# Patient Record
Sex: FEMALE | Race: WHITE | NOT HISPANIC OR LATINO
[De-identification: names, ages, dates, MRNs, and addresses within clinical notes are randomized per-mention and may not be internally consistent; named-entity substitution may affect disease eponyms.]

---

## 2017-06-08 ENCOUNTER — TRANSCRIPTION ENCOUNTER (OUTPATIENT)
Age: 72
End: 2017-06-08

## 2017-06-19 ENCOUNTER — APPOINTMENT (OUTPATIENT)
Dept: HEMATOLOGY ONCOLOGY | Facility: CLINIC | Age: 72
End: 2017-06-19

## 2017-06-19 ENCOUNTER — OUTPATIENT (OUTPATIENT)
Dept: OUTPATIENT SERVICES | Facility: HOSPITAL | Age: 72
LOS: 1 days | Discharge: HOME | End: 2017-06-19

## 2017-06-19 VITALS
TEMPERATURE: 99.3 F | WEIGHT: 170 LBS | DIASTOLIC BLOOD PRESSURE: 88 MMHG | SYSTOLIC BLOOD PRESSURE: 176 MMHG | HEART RATE: 81 BPM | RESPIRATION RATE: 12 BRPM

## 2017-06-19 VITALS
TEMPERATURE: 99.3 F | HEIGHT: 64 IN | RESPIRATION RATE: 12 BRPM | DIASTOLIC BLOOD PRESSURE: 88 MMHG | BODY MASS INDEX: 29.02 KG/M2 | WEIGHT: 170 LBS | HEART RATE: 81 BPM | SYSTOLIC BLOOD PRESSURE: 176 MMHG

## 2017-06-19 DIAGNOSIS — Z80.3 FAMILY HISTORY OF MALIGNANT NEOPLASM OF BREAST: ICD-10-CM

## 2017-06-20 LAB
ALBUMIN SERPL-MCNC: 4.2 G/DL
ALBUMIN/GLOB SERPL: 1.75
ALP SERPL-CCNC: 57 IU/L
ALT SERPL-CCNC: 19 IU/L
ANION GAP SERPL CALC-SCNC: 12 MEQ/L
AST SERPL-CCNC: 19 IU/L
BASOPHILS # BLD: 0.03 TH/MM3
BASOPHILS NFR BLD: 0.4 %
BILIRUB SERPL-MCNC: 0.3 MG/DL
BUN SERPL-MCNC: 16 MG/DL
BUN/CREAT SERPL: 17 %
CALCIUM SERPL-MCNC: 9 MG/DL
CHLORIDE SERPL-SCNC: 102 MEQ/L
CO2 SERPL-SCNC: 23 MEQ/L
CREAT SERPL-MCNC: 0.94 MG/DL
EOSINOPHIL # BLD: 0.14 TH/MM3
EOSINOPHIL NFR BLD: 1.7 %
ERYTHROCYTE [DISTWIDTH] IN BLOOD BY AUTOMATED COUNT: 13 %
GFR SERPL CREATININE-BSD FRML MDRD: 59
GLUCOSE SERPL-MCNC: 79 MG/DL
GRANULOCYTES # BLD: 4.04 TH/MM3
GRANULOCYTES NFR BLD: 50.1 %
HCT VFR BLD AUTO: 39 %
HGB BLD-MCNC: 13.3 G/DL
IMM GRANULOCYTES # BLD: 0.01 TH/MM3
IMM GRANULOCYTES NFR BLD: 0.1 %
LYMPHOCYTES # BLD: 3.15 TH/MM3
LYMPHOCYTES NFR BLD: 39 %
MCH RBC QN AUTO: 30.3 PG
MCHC RBC AUTO-ENTMCNC: 34.1 G/DL
MCV RBC AUTO: 88.8 FL
MONOCYTES # BLD: 0.7 TH/MM3
MONOCYTES NFR BLD: 8.7 %
PLATELET # BLD: 173 TH/MM3
PMV BLD AUTO: 11.3 FL
POTASSIUM SERPL-SCNC: 4 MMOL/L
PROT SERPL-MCNC: 6.6 G/DL
RBC # BLD AUTO: 4.39 MIL/MM3
SODIUM SERPL-SCNC: 137 MEQ/L
WBC # BLD: 8.07 TH/MM3

## 2017-06-28 DIAGNOSIS — Z85.3 PERSONAL HISTORY OF MALIGNANT NEOPLASM OF BREAST: ICD-10-CM

## 2017-08-02 ENCOUNTER — OUTPATIENT (OUTPATIENT)
Dept: OUTPATIENT SERVICES | Facility: HOSPITAL | Age: 72
LOS: 1 days | Discharge: HOME | End: 2017-08-02

## 2017-08-02 DIAGNOSIS — E66.3 OVERWEIGHT: ICD-10-CM

## 2017-08-02 DIAGNOSIS — E78.00 PURE HYPERCHOLESTEROLEMIA, UNSPECIFIED: ICD-10-CM

## 2017-08-02 DIAGNOSIS — C50.919 MALIGNANT NEOPLASM OF UNSPECIFIED SITE OF UNSPECIFIED FEMALE BREAST: ICD-10-CM

## 2017-08-02 DIAGNOSIS — Z01.818 ENCOUNTER FOR OTHER PREPROCEDURAL EXAMINATION: ICD-10-CM

## 2017-08-02 DIAGNOSIS — I10 ESSENTIAL (PRIMARY) HYPERTENSION: ICD-10-CM

## 2017-08-10 ENCOUNTER — OUTPATIENT (OUTPATIENT)
Dept: OUTPATIENT SERVICES | Facility: HOSPITAL | Age: 72
LOS: 1 days | Discharge: HOME | End: 2017-08-10

## 2017-08-10 DIAGNOSIS — G45.9 TRANSIENT CEREBRAL ISCHEMIC ATTACK, UNSPECIFIED: ICD-10-CM

## 2017-08-14 ENCOUNTER — OUTPATIENT (OUTPATIENT)
Dept: OUTPATIENT SERVICES | Facility: HOSPITAL | Age: 72
LOS: 1 days | Discharge: HOME | End: 2017-08-14

## 2017-08-14 DIAGNOSIS — R22.0 LOCALIZED SWELLING, MASS AND LUMP, HEAD: ICD-10-CM

## 2017-10-19 ENCOUNTER — OUTPATIENT (OUTPATIENT)
Dept: OUTPATIENT SERVICES | Facility: HOSPITAL | Age: 72
LOS: 1 days | Discharge: HOME | End: 2017-10-19

## 2017-10-19 DIAGNOSIS — Z12.31 ENCOUNTER FOR SCREENING MAMMOGRAM FOR MALIGNANT NEOPLASM OF BREAST: ICD-10-CM

## 2017-11-14 ENCOUNTER — OUTPATIENT (OUTPATIENT)
Dept: OUTPATIENT SERVICES | Facility: HOSPITAL | Age: 72
LOS: 1 days | Discharge: HOME | End: 2017-11-14

## 2017-11-14 DIAGNOSIS — N39.0 URINARY TRACT INFECTION, SITE NOT SPECIFIED: ICD-10-CM

## 2018-02-13 ENCOUNTER — OUTPATIENT (OUTPATIENT)
Dept: OUTPATIENT SERVICES | Facility: HOSPITAL | Age: 73
LOS: 1 days | Discharge: HOME | End: 2018-02-13

## 2018-02-13 DIAGNOSIS — E55.9 VITAMIN D DEFICIENCY, UNSPECIFIED: ICD-10-CM

## 2018-02-13 DIAGNOSIS — E06.3 AUTOIMMUNE THYROIDITIS: ICD-10-CM

## 2018-02-13 DIAGNOSIS — I10 ESSENTIAL (PRIMARY) HYPERTENSION: ICD-10-CM

## 2018-02-13 DIAGNOSIS — E78.00 PURE HYPERCHOLESTEROLEMIA, UNSPECIFIED: ICD-10-CM

## 2018-02-13 DIAGNOSIS — N39.0 URINARY TRACT INFECTION, SITE NOT SPECIFIED: ICD-10-CM

## 2018-02-13 DIAGNOSIS — R53.83 OTHER FATIGUE: ICD-10-CM

## 2018-02-13 DIAGNOSIS — Z00.00 ENCOUNTER FOR GENERAL ADULT MEDICAL EXAMINATION WITHOUT ABNORMAL FINDINGS: ICD-10-CM

## 2018-02-13 DIAGNOSIS — C50.919 MALIGNANT NEOPLASM OF UNSPECIFIED SITE OF UNSPECIFIED FEMALE BREAST: ICD-10-CM

## 2018-02-13 DIAGNOSIS — M81.0 AGE-RELATED OSTEOPOROSIS WITHOUT CURRENT PATHOLOGICAL FRACTURE: ICD-10-CM

## 2018-02-13 DIAGNOSIS — E78.2 MIXED HYPERLIPIDEMIA: ICD-10-CM

## 2018-02-13 DIAGNOSIS — E66.3 OVERWEIGHT: ICD-10-CM

## 2018-02-13 DIAGNOSIS — E66.09 OTHER OBESITY DUE TO EXCESS CALORIES: ICD-10-CM

## 2018-02-13 DIAGNOSIS — E11.8 TYPE 2 DIABETES MELLITUS WITH UNSPECIFIED COMPLICATIONS: ICD-10-CM

## 2018-02-13 DIAGNOSIS — R10.9 UNSPECIFIED ABDOMINAL PAIN: ICD-10-CM

## 2018-08-06 ENCOUNTER — APPOINTMENT (OUTPATIENT)
Dept: HEMATOLOGY ONCOLOGY | Facility: CLINIC | Age: 73
End: 2018-08-06

## 2018-08-06 ENCOUNTER — OUTPATIENT (OUTPATIENT)
Dept: OUTPATIENT SERVICES | Facility: HOSPITAL | Age: 73
LOS: 1 days | Discharge: HOME | End: 2018-08-06

## 2018-08-06 ENCOUNTER — LABORATORY RESULT (OUTPATIENT)
Age: 73
End: 2018-08-06

## 2018-08-06 VITALS
TEMPERATURE: 99.2 F | RESPIRATION RATE: 14 BRPM | WEIGHT: 173 LBS | SYSTOLIC BLOOD PRESSURE: 149 MMHG | HEART RATE: 84 BPM | HEIGHT: 64 IN | BODY MASS INDEX: 29.53 KG/M2 | DIASTOLIC BLOOD PRESSURE: 70 MMHG

## 2018-08-06 DIAGNOSIS — E78.1 PURE HYPERGLYCERIDEMIA: ICD-10-CM

## 2018-08-06 DIAGNOSIS — E78.00 PURE HYPERCHOLESTEROLEMIA, UNSPECIFIED: ICD-10-CM

## 2018-08-06 DIAGNOSIS — I10 ESSENTIAL (PRIMARY) HYPERTENSION: ICD-10-CM

## 2018-08-06 LAB
HCT VFR BLD CALC: 40.3 %
HGB BLD-MCNC: 13.5 G/DL
MCHC RBC-ENTMCNC: 30.3 PG
MCHC RBC-ENTMCNC: 33.5 G/DL
MCV RBC AUTO: 90.6 FL
PLATELET # BLD AUTO: 173 K/UL
PMV BLD: 11.5 FL
RBC # BLD: 4.45 M/UL
RBC # FLD: 12.7 %
WBC # FLD AUTO: 8.43 K/UL

## 2018-08-07 LAB
ALBUMIN SERPL ELPH-MCNC: 4.4 G/DL
ALP BLD-CCNC: 47 U/L
ALT SERPL-CCNC: 17 U/L
ANION GAP SERPL CALC-SCNC: 16 MMOL/L
AST SERPL-CCNC: 18 U/L
BILIRUB SERPL-MCNC: 0.4 MG/DL
BUN SERPL-MCNC: 13 MG/DL
CALCIUM SERPL-MCNC: 9.7 MG/DL
CHLORIDE SERPL-SCNC: 98 MMOL/L
CO2 SERPL-SCNC: 25 MMOL/L
CREAT SERPL-MCNC: 0.8 MG/DL
GLUCOSE SERPL-MCNC: 93 MG/DL
POTASSIUM SERPL-SCNC: 4.7 MMOL/L
PROT SERPL-MCNC: 7 G/DL
SODIUM SERPL-SCNC: 139 MMOL/L

## 2018-08-09 ENCOUNTER — OUTPATIENT (OUTPATIENT)
Dept: OUTPATIENT SERVICES | Facility: HOSPITAL | Age: 73
LOS: 1 days | Discharge: HOME | End: 2018-08-09

## 2018-08-09 ENCOUNTER — OTHER (OUTPATIENT)
Age: 73
End: 2018-08-09

## 2018-08-09 DIAGNOSIS — E66.3 OVERWEIGHT: ICD-10-CM

## 2018-08-09 DIAGNOSIS — E78.00 PURE HYPERCHOLESTEROLEMIA, UNSPECIFIED: ICD-10-CM

## 2018-08-09 DIAGNOSIS — C50.919 MALIGNANT NEOPLASM OF UNSPECIFIED SITE OF UNSPECIFIED FEMALE BREAST: ICD-10-CM

## 2018-08-09 DIAGNOSIS — I10 ESSENTIAL (PRIMARY) HYPERTENSION: ICD-10-CM

## 2018-08-13 DIAGNOSIS — Z85.3 PERSONAL HISTORY OF MALIGNANT NEOPLASM OF BREAST: ICD-10-CM

## 2018-10-21 ENCOUNTER — FORM ENCOUNTER (OUTPATIENT)
Age: 73
End: 2018-10-21

## 2018-10-22 ENCOUNTER — OUTPATIENT (OUTPATIENT)
Dept: OUTPATIENT SERVICES | Facility: HOSPITAL | Age: 73
LOS: 1 days | Discharge: HOME | End: 2018-10-22

## 2018-10-22 DIAGNOSIS — Z85.3 PERSONAL HISTORY OF MALIGNANT NEOPLASM OF BREAST: ICD-10-CM

## 2018-10-22 DIAGNOSIS — Z12.31 ENCOUNTER FOR SCREENING MAMMOGRAM FOR MALIGNANT NEOPLASM OF BREAST: ICD-10-CM

## 2018-11-23 ENCOUNTER — OUTPATIENT (OUTPATIENT)
Dept: OUTPATIENT SERVICES | Facility: HOSPITAL | Age: 73
LOS: 1 days | Discharge: HOME | End: 2018-11-23

## 2018-11-23 VITALS
WEIGHT: 174.17 LBS | RESPIRATION RATE: 16 BRPM | OXYGEN SATURATION: 98 % | DIASTOLIC BLOOD PRESSURE: 75 MMHG | HEART RATE: 78 BPM | TEMPERATURE: 98 F | SYSTOLIC BLOOD PRESSURE: 148 MMHG | HEIGHT: 64 IN

## 2018-11-23 DIAGNOSIS — Z01.818 ENCOUNTER FOR OTHER PREPROCEDURAL EXAMINATION: ICD-10-CM

## 2018-11-23 DIAGNOSIS — E66.9 OBESITY, UNSPECIFIED: ICD-10-CM

## 2018-11-23 DIAGNOSIS — D17.9 BENIGN LIPOMATOUS NEOPLASM, UNSPECIFIED: ICD-10-CM

## 2018-11-23 DIAGNOSIS — I10 ESSENTIAL (PRIMARY) HYPERTENSION: ICD-10-CM

## 2018-11-23 DIAGNOSIS — E78.00 PURE HYPERCHOLESTEROLEMIA, UNSPECIFIED: ICD-10-CM

## 2018-11-23 DIAGNOSIS — C80.1 MALIGNANT (PRIMARY) NEOPLASM, UNSPECIFIED: ICD-10-CM

## 2018-11-23 DIAGNOSIS — Z90.11 ACQUIRED ABSENCE OF RIGHT BREAST AND NIPPLE: Chronic | ICD-10-CM

## 2018-11-23 LAB
ALBUMIN SERPL ELPH-MCNC: 4.6 G/DL — SIGNIFICANT CHANGE UP (ref 3.5–5.2)
ALP SERPL-CCNC: 55 U/L — SIGNIFICANT CHANGE UP (ref 30–115)
ALT FLD-CCNC: 21 U/L — SIGNIFICANT CHANGE UP (ref 0–41)
ANION GAP SERPL CALC-SCNC: 17 MMOL/L — HIGH (ref 7–14)
APTT BLD: 30 SEC — SIGNIFICANT CHANGE UP (ref 27–39.2)
AST SERPL-CCNC: 23 U/L — SIGNIFICANT CHANGE UP (ref 0–41)
BASOPHILS # BLD AUTO: 0.03 K/UL — SIGNIFICANT CHANGE UP (ref 0–0.2)
BASOPHILS NFR BLD AUTO: 0.4 % — SIGNIFICANT CHANGE UP (ref 0–1)
BILIRUB SERPL-MCNC: 0.5 MG/DL — SIGNIFICANT CHANGE UP (ref 0.2–1.2)
BUN SERPL-MCNC: 14 MG/DL — SIGNIFICANT CHANGE UP (ref 10–20)
CALCIUM SERPL-MCNC: 9.8 MG/DL — SIGNIFICANT CHANGE UP (ref 8.5–10.1)
CHLORIDE SERPL-SCNC: 95 MMOL/L — LOW (ref 98–110)
CO2 SERPL-SCNC: 25 MMOL/L — SIGNIFICANT CHANGE UP (ref 17–32)
CREAT SERPL-MCNC: 0.7 MG/DL — SIGNIFICANT CHANGE UP (ref 0.7–1.5)
EOSINOPHIL # BLD AUTO: 0.15 K/UL — SIGNIFICANT CHANGE UP (ref 0–0.7)
EOSINOPHIL NFR BLD AUTO: 1.8 % — SIGNIFICANT CHANGE UP (ref 0–8)
GLUCOSE SERPL-MCNC: 91 MG/DL — SIGNIFICANT CHANGE UP (ref 70–99)
HCT VFR BLD CALC: 39.3 % — SIGNIFICANT CHANGE UP (ref 37–47)
HGB BLD-MCNC: 13.2 G/DL — SIGNIFICANT CHANGE UP (ref 12–16)
IMM GRANULOCYTES NFR BLD AUTO: 0.2 % — SIGNIFICANT CHANGE UP (ref 0.1–0.3)
INR BLD: 1.05 RATIO — SIGNIFICANT CHANGE UP (ref 0.65–1.3)
LYMPHOCYTES # BLD AUTO: 3.35 K/UL — SIGNIFICANT CHANGE UP (ref 1.2–3.4)
LYMPHOCYTES # BLD AUTO: 39.9 % — SIGNIFICANT CHANGE UP (ref 20.5–51.1)
MCHC RBC-ENTMCNC: 30 PG — SIGNIFICANT CHANGE UP (ref 27–31)
MCHC RBC-ENTMCNC: 33.6 G/DL — SIGNIFICANT CHANGE UP (ref 32–37)
MCV RBC AUTO: 89.3 FL — SIGNIFICANT CHANGE UP (ref 81–99)
MONOCYTES # BLD AUTO: 0.72 K/UL — HIGH (ref 0.1–0.6)
MONOCYTES NFR BLD AUTO: 8.6 % — SIGNIFICANT CHANGE UP (ref 1.7–9.3)
NEUTROPHILS # BLD AUTO: 4.12 K/UL — SIGNIFICANT CHANGE UP (ref 1.4–6.5)
NEUTROPHILS NFR BLD AUTO: 49.1 % — SIGNIFICANT CHANGE UP (ref 42.2–75.2)
NRBC # BLD: 0 /100 WBCS — SIGNIFICANT CHANGE UP (ref 0–0)
PLATELET # BLD AUTO: 193 K/UL — SIGNIFICANT CHANGE UP (ref 130–400)
POTASSIUM SERPL-MCNC: 4.2 MMOL/L — SIGNIFICANT CHANGE UP (ref 3.5–5)
POTASSIUM SERPL-SCNC: 4.2 MMOL/L — SIGNIFICANT CHANGE UP (ref 3.5–5)
PROT SERPL-MCNC: 7.3 G/DL — SIGNIFICANT CHANGE UP (ref 6–8)
PROTHROM AB SERPL-ACNC: 12.1 SEC — SIGNIFICANT CHANGE UP (ref 9.95–12.87)
RBC # BLD: 4.4 M/UL — SIGNIFICANT CHANGE UP (ref 4.2–5.4)
RBC # FLD: 12.4 % — SIGNIFICANT CHANGE UP (ref 11.5–14.5)
SODIUM SERPL-SCNC: 137 MMOL/L — SIGNIFICANT CHANGE UP (ref 135–146)
WBC # BLD: 8.39 K/UL — SIGNIFICANT CHANGE UP (ref 4.8–10.8)
WBC # FLD AUTO: 8.39 K/UL — SIGNIFICANT CHANGE UP (ref 4.8–10.8)

## 2018-11-23 NOTE — H&P PST ADULT - HISTORY OF PRESENT ILLNESS
"HE'S REMOVING A LIPOMA"   POINTS TO RIGHT SUPRACLAVICULAR REGION  PT CURRENTLY DENIES CHEST PAIN, PALPITATIONS, DYSURIA, RECENT ILLNESS  EXERCISE TOLERANCE 10 BLOCKS  PT DENIES ANY RASHES, ABRASION, OR OPEN WOUNDS OR LACERATIONS "HE'S REMOVING A LIPOMA"   POINTS TO RIGHT SUPRACLAVICULAR REGION  PT CURRENTLY DENIES CHEST PAIN, PALPITATIONS, DYSURIA, RECENT ILLNESS  EXERCISE TOLERANCE 10 BLOCKS  PT DENIES ANY RASHES, ABRASION, OR OPEN WOUNDS OR LACERATIONS  AS PER THE PT, THIS IS A COMPLETE MEDICAL AND SURGICAL HX, INCLUDING MEDICATIONS PRESCRIBED AND OVER THE COUNTER

## 2018-11-23 NOTE — H&P PST ADULT - FAMILY HISTORY
Mother  Still living? No  Family history of breast cancer, Age at diagnosis: Age Unknown     Father  Still living? No  Family history of rectal cancer, Age at diagnosis: Age Unknown

## 2018-11-23 NOTE — H&P PST ADULT - PMH
Cancer  BREAST   MASTECTOMY RIGHT  CHEMO RADIATION  HTN (hypertension)    Hypercholesteremia    Murmur    TIA (transient ischemic attack)  2016

## 2018-11-23 NOTE — H&P PST ADULT - NS NEC GEN PE MLT EXAM PC
Final Anesthesia Post-op Assessment    Patient: Yaya Hammond  Procedure(s) Performed: REPAIR OF UMBILICAL HERNIA WITH THE USE OF MESH  Anesthesia type: General    Vital Last Value   Temperature 36.6 °C (97.9 °F) (09/06/18 1205)   Pulse 97 (09/06/18 1305)   Respiratory Rate 18 (09/06/18 1305)   Non-Invasive   Blood Pressure 130/68 (09/06/18 1305)   Arterial  Blood Pressure     Pulse Oximetry 93 % (09/06/18 1305)     Last 24 I/O:   Intake/Output Summary (Last 24 hours) at 09/06/18 1322  Last data filed at 09/06/18 1200   Gross per 24 hour   Intake             1650 ml   Output                0 ml   Net             1650 ml       PATIENT LOCATION: Phase II  POST-OP VITAL SIGNS: stable  LEVEL OF CONSCIOUSNESS: participates in exam, awake, oriented, answers questions appropriately and alert  RESPIRATORY STATUS: spontaneous ventilation and unassisted  CARDIOVASCULAR: blood pressure returned to baseline  HYDRATION: euvolemic    PAIN MANAGEMENT: well controlled  NAUSEA: None  AIRWAY PATENCY: patent  POST-OP ASSESSMENT: no complications, patient tolerated procedure well with no complications, no evidence of recall and sufficiently recovered from acute administration of anesthesia effects and able to participate in evaluation  COMPLICATIONS: none  HANDOFF:  Handoff to receiving nurse was performed and questions were answered       detailed exam

## 2018-11-26 PROBLEM — E78.00 PURE HYPERCHOLESTEROLEMIA, UNSPECIFIED: Chronic | Status: ACTIVE | Noted: 2018-11-23

## 2018-11-26 PROBLEM — G45.9 TRANSIENT CEREBRAL ISCHEMIC ATTACK, UNSPECIFIED: Chronic | Status: ACTIVE | Noted: 2018-11-23

## 2018-11-26 PROBLEM — I10 ESSENTIAL (PRIMARY) HYPERTENSION: Chronic | Status: ACTIVE | Noted: 2018-11-23

## 2018-11-26 PROBLEM — R01.1 CARDIAC MURMUR, UNSPECIFIED: Chronic | Status: ACTIVE | Noted: 2018-11-23

## 2018-12-07 ENCOUNTER — RESULT REVIEW (OUTPATIENT)
Age: 73
End: 2018-12-07

## 2018-12-07 ENCOUNTER — OUTPATIENT (OUTPATIENT)
Dept: OUTPATIENT SERVICES | Facility: HOSPITAL | Age: 73
LOS: 1 days | Discharge: HOME | End: 2018-12-07

## 2018-12-07 VITALS — DIASTOLIC BLOOD PRESSURE: 58 MMHG | HEART RATE: 82 BPM | RESPIRATION RATE: 18 BRPM | SYSTOLIC BLOOD PRESSURE: 122 MMHG

## 2018-12-07 VITALS
TEMPERATURE: 98 F | SYSTOLIC BLOOD PRESSURE: 154 MMHG | RESPIRATION RATE: 16 BRPM | HEIGHT: 64 IN | WEIGHT: 167.99 LBS | HEART RATE: 81 BPM | DIASTOLIC BLOOD PRESSURE: 74 MMHG

## 2018-12-07 DIAGNOSIS — Z90.11 ACQUIRED ABSENCE OF RIGHT BREAST AND NIPPLE: Chronic | ICD-10-CM

## 2018-12-07 RX ORDER — OXYCODONE AND ACETAMINOPHEN 5; 325 MG/1; MG/1
1 TABLET ORAL EVERY 4 HOURS
Qty: 0 | Refills: 0 | Status: DISCONTINUED | OUTPATIENT
Start: 2018-12-07 | End: 2018-12-07

## 2018-12-07 RX ORDER — SIMVASTATIN 20 MG/1
1 TABLET, FILM COATED ORAL
Qty: 0 | Refills: 0 | COMMUNITY

## 2018-12-07 RX ORDER — SODIUM CHLORIDE 9 MG/ML
1000 INJECTION, SOLUTION INTRAVENOUS
Qty: 0 | Refills: 0 | Status: DISCONTINUED | OUTPATIENT
Start: 2018-12-07 | End: 2018-12-22

## 2018-12-07 RX ORDER — MORPHINE SULFATE 50 MG/1
2 CAPSULE, EXTENDED RELEASE ORAL
Qty: 0 | Refills: 0 | Status: DISCONTINUED | OUTPATIENT
Start: 2018-12-07 | End: 2018-12-07

## 2018-12-07 RX ORDER — OMEGA-3 ACID ETHYL ESTERS 1 G
2800 CAPSULE ORAL
Qty: 0 | Refills: 0 | COMMUNITY

## 2018-12-07 RX ORDER — ASPIRIN/CALCIUM CARB/MAGNESIUM 324 MG
1 TABLET ORAL
Qty: 0 | Refills: 0 | COMMUNITY

## 2018-12-07 RX ORDER — ONDANSETRON 8 MG/1
4 TABLET, FILM COATED ORAL ONCE
Qty: 0 | Refills: 0 | Status: DISCONTINUED | OUTPATIENT
Start: 2018-12-07 | End: 2018-12-22

## 2018-12-07 NOTE — ASU PATIENT PROFILE, ADULT - PAIN SCALE PREFERRED, PROFILE
numerical 0-10
other/age(85 years old or older)/coagulation(Bleeding disorder R/T clinical cond/anti-coags)

## 2018-12-07 NOTE — BRIEF OPERATIVE NOTE - PROCEDURE
<<-----Click on this checkbox to enter Procedure Excision of mass  12/07/2018  right shoulder  Active  APAL1

## 2018-12-07 NOTE — ASU DISCHARGE PLAN (ADULT/PEDIATRIC). - MEDICATION SUMMARY - MEDICATIONS TO TAKE
I will START or STAY ON the medications listed below when I get home from the hospital:    oxyCODONE-acetaminophen 5 mg-325 mg oral tablet  -- 1 tab(s) by mouth every 6 hours, As Needed -for severe pain MDD:4 tab   -- Caution federal law prohibits the transfer of this drug to any person other  than the person for whom it was prescribed.  May cause drowsiness.  Alcohol may intensify this effect.  Use care when operating dangerous machinery.  This prescription cannot be refilled.  This product contains acetaminophen.  Do not use  with any other product containing acetaminophen to prevent possible liver damage.  Using more of this medication than prescribed may cause serious breathing problems.    -- Indication: For [id number]

## 2018-12-11 LAB — SURGICAL PATHOLOGY STUDY: SIGNIFICANT CHANGE UP

## 2018-12-12 DIAGNOSIS — D17.0 BENIGN LIPOMATOUS NEOPLASM OF SKIN AND SUBCUTANEOUS TISSUE OF HEAD, FACE AND NECK: ICD-10-CM

## 2018-12-12 DIAGNOSIS — E78.00 PURE HYPERCHOLESTEROLEMIA, UNSPECIFIED: ICD-10-CM

## 2018-12-12 DIAGNOSIS — I10 ESSENTIAL (PRIMARY) HYPERTENSION: ICD-10-CM

## 2019-02-13 ENCOUNTER — OUTPATIENT (OUTPATIENT)
Dept: OUTPATIENT SERVICES | Facility: HOSPITAL | Age: 74
LOS: 1 days | Discharge: HOME | End: 2019-02-13

## 2019-02-13 DIAGNOSIS — Z90.11 ACQUIRED ABSENCE OF RIGHT BREAST AND NIPPLE: Chronic | ICD-10-CM

## 2019-02-13 DIAGNOSIS — E78.00 PURE HYPERCHOLESTEROLEMIA, UNSPECIFIED: ICD-10-CM

## 2019-02-13 DIAGNOSIS — E66.3 OVERWEIGHT: ICD-10-CM

## 2019-02-13 DIAGNOSIS — C50.919 MALIGNANT NEOPLASM OF UNSPECIFIED SITE OF UNSPECIFIED FEMALE BREAST: ICD-10-CM

## 2019-02-13 DIAGNOSIS — I10 ESSENTIAL (PRIMARY) HYPERTENSION: ICD-10-CM

## 2019-05-28 ENCOUNTER — RECORD ABSTRACTING (OUTPATIENT)
Age: 74
End: 2019-05-28

## 2019-05-28 DIAGNOSIS — Z86.69 PERSONAL HISTORY OF OTHER DISEASES OF THE NERVOUS SYSTEM AND SENSE ORGANS: ICD-10-CM

## 2019-06-19 ENCOUNTER — APPOINTMENT (OUTPATIENT)
Dept: NEUROLOGY | Facility: CLINIC | Age: 74
End: 2019-06-19
Payer: MEDICARE

## 2019-06-19 VITALS
DIASTOLIC BLOOD PRESSURE: 76 MMHG | WEIGHT: 168 LBS | BODY MASS INDEX: 28.68 KG/M2 | HEART RATE: 85 BPM | HEIGHT: 64 IN | SYSTOLIC BLOOD PRESSURE: 125 MMHG

## 2019-06-19 DIAGNOSIS — G45.9 TRANSIENT CEREBRAL ISCHEMIC ATTACK, UNSPECIFIED: ICD-10-CM

## 2019-06-19 PROCEDURE — 99214 OFFICE O/P EST MOD 30 MIN: CPT

## 2019-06-19 RX ORDER — ENALAPRIL MALEATE 20 MG/1
20 TABLET ORAL
Refills: 0 | Status: ACTIVE | COMMUNITY

## 2019-06-19 RX ORDER — SIMVASTATIN 20 MG/1
20 TABLET, FILM COATED ORAL
Refills: 0 | Status: ACTIVE | COMMUNITY

## 2019-06-19 RX ORDER — ASPIRIN 81 MG/1
81 TABLET, CHEWABLE ORAL DAILY
Refills: 0 | Status: ACTIVE | COMMUNITY

## 2019-06-19 NOTE — DISCUSSION/SUMMARY
[FreeTextEntry1] : Patient with history of TIA here for follow up.  No new complaints and doing well\par 1. Carotid dopplers - should also be done yearly\par 2 Can follow with her PMD and return for any new neurological issues

## 2019-06-19 NOTE — HISTORY OF PRESENT ILLNESS
[FreeTextEntry1] : Callie is a 74-year-old woman last seen by me on January 11, 2018 for followup of her TIA. Been on baby aspirin as well as simvastatin and has been doing well with no further episodes of diplopia or dizziness. She did see a doctor in the last 2 months he said that he heard a carotid bruit and then follow with her cardiologist who did not hear carotid bruit. She has not had carotid Dopplers done since the hospitalization 2-1/2 years ago.  I did a CT angiogram on her on August 14, 2017 which only showed atherosclerosis of the cavernous and supraclinoid internal carotid arteries without significant stenosis. She did have last year lipoma from the right shoulder region removed and now has a new lipoma in the right trapezius location. She has no new complaints and otherwise feels well.

## 2019-06-19 NOTE — PHYSICAL EXAM
[FreeTextEntry1] : Orientation: oriented to person, oriented to place and oriented to time. \par Attention: normal concentrating ability and visual attention was not decreased. \par Language: no difficulty naming common objects, no difficulty repeating a phrase, no difficulty writing a sentence, fluency intact, comprehension intact and reading intact. \par Fund of knowledge: displays adequate knowledge of personal past history. \par Cranial Nerves: visual fields full to confrontation, pupils equal round and reactive to light, extraocular motion intact, facial sensation intact symmetrically, face symmetrical, hearing was intact bilaterally, tongue and palate midline, head turning and shoulder shrug symmetric and there was no tongue deviation with protrusion. \par Motor: muscle tone was normal in all four extremities, muscle strength was normal in all four extremities and normal bulk in all four extremities. \par Sensory exam: light touch, PP, Vibration was intact. \par Coordination:. normal gait. balance was intact. there was no past-pointing. no tremor present. \par Deep tendon reflexes: \par 1+ in UE and absent in LE\par Plantar responses normal on the right, normal on the left.  \par

## 2019-06-28 ENCOUNTER — OUTPATIENT (OUTPATIENT)
Dept: OUTPATIENT SERVICES | Facility: HOSPITAL | Age: 74
LOS: 1 days | Discharge: HOME | End: 2019-06-28
Payer: MEDICARE

## 2019-06-28 DIAGNOSIS — G45.9 TRANSIENT CEREBRAL ISCHEMIC ATTACK, UNSPECIFIED: ICD-10-CM

## 2019-06-28 DIAGNOSIS — Z90.11 ACQUIRED ABSENCE OF RIGHT BREAST AND NIPPLE: Chronic | ICD-10-CM

## 2019-06-28 PROCEDURE — 93880 EXTRACRANIAL BILAT STUDY: CPT | Mod: 26

## 2019-08-08 ENCOUNTER — OUTPATIENT (OUTPATIENT)
Dept: OUTPATIENT SERVICES | Facility: HOSPITAL | Age: 74
LOS: 1 days | Discharge: HOME | End: 2019-08-08

## 2019-08-08 DIAGNOSIS — E78.00 PURE HYPERCHOLESTEROLEMIA, UNSPECIFIED: ICD-10-CM

## 2019-08-08 DIAGNOSIS — C50.919 MALIGNANT NEOPLASM OF UNSPECIFIED SITE OF UNSPECIFIED FEMALE BREAST: ICD-10-CM

## 2019-08-08 DIAGNOSIS — Z90.11 ACQUIRED ABSENCE OF RIGHT BREAST AND NIPPLE: Chronic | ICD-10-CM

## 2019-08-08 DIAGNOSIS — E66.3 OVERWEIGHT: ICD-10-CM

## 2019-08-08 DIAGNOSIS — I10 ESSENTIAL (PRIMARY) HYPERTENSION: ICD-10-CM

## 2019-11-04 ENCOUNTER — APPOINTMENT (OUTPATIENT)
Dept: HEMATOLOGY ONCOLOGY | Facility: CLINIC | Age: 74
End: 2019-11-04
Payer: MEDICARE

## 2019-11-04 ENCOUNTER — OUTPATIENT (OUTPATIENT)
Dept: OUTPATIENT SERVICES | Facility: HOSPITAL | Age: 74
LOS: 1 days | Discharge: HOME | End: 2019-11-04

## 2019-11-04 ENCOUNTER — FORM ENCOUNTER (OUTPATIENT)
Age: 74
End: 2019-11-04

## 2019-11-04 ENCOUNTER — LABORATORY RESULT (OUTPATIENT)
Age: 74
End: 2019-11-04

## 2019-11-04 VITALS
BODY MASS INDEX: 29.71 KG/M2 | SYSTOLIC BLOOD PRESSURE: 135 MMHG | HEIGHT: 64 IN | WEIGHT: 174 LBS | TEMPERATURE: 97.7 F | RESPIRATION RATE: 14 BRPM | HEART RATE: 85 BPM | DIASTOLIC BLOOD PRESSURE: 62 MMHG

## 2019-11-04 DIAGNOSIS — Z90.11 ACQUIRED ABSENCE OF RIGHT BREAST AND NIPPLE: Chronic | ICD-10-CM

## 2019-11-04 DIAGNOSIS — Z00.00 ENCOUNTER FOR GENERAL ADULT MEDICAL EXAMINATION W/OUT ABNORMAL FINDINGS: ICD-10-CM

## 2019-11-04 PROCEDURE — 99213 OFFICE O/P EST LOW 20 MIN: CPT

## 2019-11-05 ENCOUNTER — OUTPATIENT (OUTPATIENT)
Dept: OUTPATIENT SERVICES | Facility: HOSPITAL | Age: 74
LOS: 1 days | Discharge: HOME | End: 2019-11-05
Payer: MEDICARE

## 2019-11-05 DIAGNOSIS — Z12.31 ENCOUNTER FOR SCREENING MAMMOGRAM FOR MALIGNANT NEOPLASM OF BREAST: ICD-10-CM

## 2019-11-05 DIAGNOSIS — Z85.3 PERSONAL HISTORY OF MALIGNANT NEOPLASM OF BREAST: ICD-10-CM

## 2019-11-05 DIAGNOSIS — Z90.11 ACQUIRED ABSENCE OF RIGHT BREAST AND NIPPLE: Chronic | ICD-10-CM

## 2019-11-05 LAB
ALBUMIN SERPL ELPH-MCNC: 4.6 G/DL
ALP BLD-CCNC: 59 U/L
ALT SERPL-CCNC: 13 U/L
ANION GAP SERPL CALC-SCNC: 19 MMOL/L
AST SERPL-CCNC: 19 U/L
BILIRUB SERPL-MCNC: 0.3 MG/DL
BUN SERPL-MCNC: 18 MG/DL
CALCIUM SERPL-MCNC: 9.6 MG/DL
CHLORIDE SERPL-SCNC: 101 MMOL/L
CO2 SERPL-SCNC: 18 MMOL/L
CREAT SERPL-MCNC: 0.9 MG/DL
GLUCOSE SERPL-MCNC: 89 MG/DL
HCT VFR BLD CALC: 41.1 %
HGB BLD-MCNC: 14.3 G/DL
MCHC RBC-ENTMCNC: 30.8 PG
MCHC RBC-ENTMCNC: 34.8 G/DL
MCV RBC AUTO: 88.4 FL
PLATELET # BLD AUTO: 194 K/UL
PMV BLD: 11.6 FL
POTASSIUM SERPL-SCNC: 4.6 MMOL/L
PROT SERPL-MCNC: 7.3 G/DL
RBC # BLD: 4.65 M/UL
RBC # FLD: 12.8 %
SODIUM SERPL-SCNC: 138 MMOL/L
WBC # FLD AUTO: 8.48 K/UL

## 2019-11-05 PROCEDURE — 77067 SCR MAMMO BI INCL CAD: CPT | Mod: 26,52,LT

## 2019-11-05 NOTE — ASSESSMENT
[FreeTextEntry1] : 1. 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 Due for her left mammogram tomorrow\par 2. Squamous cell carcinoma? vs dysplasia on left hand. Resected completely and cauterized. Dermatology following. \par \par Today, we will draw a CBC, CMP. If those are negative, in addition to the above evaluation, then the patient will be seen in 1 year for follow up.\par \par All questions answered.

## 2019-11-05 NOTE — HISTORY OF PRESENT ILLNESS
[Disease: _____________________] : Disease: [unfilled] [AJCC Stage: ____] : AJCC Stage: [unfilled] [de-identified] : The patient is coming for her regularly scheduled follow up for her history of breast cancer diagnosed and treated almost 26 - 27 years ago.\par She was last seen about a year ago. \par Since then, she had a lipoma resected in Dec 2018 but she has developed another one on neck which is growing . She had a superficial skin lesion on the left hand which was cauterized; biopsy was consistent with squamous cell carcinoma ("precancerous', according to the patient). \par Denies weight loss, fevers, night sweats, new change in medications.   [de-identified] : Negative ER/MS

## 2019-11-05 NOTE — PHYSICAL EXAM
[Fully active, able to carry on all pre-disease performance without restriction] : Status 0 - Fully active, able to carry on all pre-disease performance without restriction [Obese] : obese [Normal] : normoactive bowel sounds, soft and nontender, no hepatosplenomegaly or masses appreciated [de-identified] : Varicosities present on lower legs [de-identified] : S/P right mastectomy, no evidence of local recurrence. Left breast is negative for any obvious masses, lumps or discharge but the consistency is somewhat lumpy-bumpy as before. [de-identified] : Some arthritic changes [de-identified] : Scattered berry angiomas and seborrheic keratotic lesions. In addition, underneath the scar of the right breast, there is a well-circumscribed black lesion.

## 2019-11-05 NOTE — ASSESSMENT
[FreeTextEntry1] : 1. History of ER/SD negative breast carcinoma, stage II-III, S/P surgery chemotherapy and radiation, clinically no evidence of recurrence. Due for her left mammogram tomorrow\par 2. Squamous cell carcinoma? vs dysplasia on left hand. Resected completely and cauterized. Dermatology following. \par \par Today, we will draw a CBC, CMP. If those are negative, in addition to the above evaluation, then the patient will be seen in 1 year for follow up.\par \par All questions answered.

## 2019-11-05 NOTE — REVIEW OF SYSTEMS
[Recent Change In Weight] : ~T recent weight change [Joint Pain] : joint pain [Joint Stiffness] : joint stiffness [Negative] : Cardiovascular [FreeTextEntry2] : Gained a few lbs [FreeTextEntry9] : Mostly knees [de-identified] : Right shoulder lump

## 2019-11-05 NOTE — PHYSICAL EXAM
[Fully active, able to carry on all pre-disease performance without restriction] : Status 0 - Fully active, able to carry on all pre-disease performance without restriction [Obese] : obese [Normal] : normoactive bowel sounds, soft and nontender, no hepatosplenomegaly or masses appreciated [de-identified] : Varicosities present on lower legs [de-identified] : S/P right mastectomy, no evidence of local recurrence. Left breast is negative for any obvious masses, lumps or discharge but the consistency is somewhat lumpy-bumpy as before. [de-identified] : Some arthritic changes [de-identified] : Scattered berry angiomas and seborrheic keratotic lesions. In addition, underneath the scar of the right breast, there is a well-circumscribed black lesion.

## 2019-11-05 NOTE — REVIEW OF SYSTEMS
[Recent Change In Weight] : ~T recent weight change [Joint Pain] : joint pain [Joint Stiffness] : joint stiffness [Negative] : Cardiovascular [FreeTextEntry2] : Gained a few lbs [FreeTextEntry9] : Mostly knees [de-identified] : Right shoulder lump

## 2019-11-06 ENCOUNTER — OTHER (OUTPATIENT)
Age: 74
End: 2019-11-06

## 2019-11-10 ENCOUNTER — FORM ENCOUNTER (OUTPATIENT)
Age: 74
End: 2019-11-10

## 2019-11-11 ENCOUNTER — OUTPATIENT (OUTPATIENT)
Dept: OUTPATIENT SERVICES | Facility: HOSPITAL | Age: 74
LOS: 1 days | Discharge: HOME | End: 2019-11-11
Payer: MEDICARE

## 2019-11-11 DIAGNOSIS — R92.8 OTHER ABNORMAL AND INCONCLUSIVE FINDINGS ON DIAGNOSTIC IMAGING OF BREAST: ICD-10-CM

## 2019-11-11 DIAGNOSIS — Z90.11 ACQUIRED ABSENCE OF RIGHT BREAST AND NIPPLE: Chronic | ICD-10-CM

## 2019-11-11 PROCEDURE — G0279: CPT | Mod: 26,LT

## 2019-11-11 PROCEDURE — 77065 DX MAMMO INCL CAD UNI: CPT | Mod: 26,LT

## 2019-11-11 PROCEDURE — 76642 ULTRASOUND BREAST LIMITED: CPT | Mod: 26,LT

## 2019-11-18 ENCOUNTER — FORM ENCOUNTER (OUTPATIENT)
Age: 74
End: 2019-11-18

## 2019-11-19 ENCOUNTER — OUTPATIENT (OUTPATIENT)
Dept: OUTPATIENT SERVICES | Facility: HOSPITAL | Age: 74
LOS: 1 days | Discharge: HOME | End: 2019-11-19
Payer: MEDICARE

## 2019-11-19 ENCOUNTER — RESULT REVIEW (OUTPATIENT)
Age: 74
End: 2019-11-19

## 2019-11-19 DIAGNOSIS — Z90.11 ACQUIRED ABSENCE OF RIGHT BREAST AND NIPPLE: Chronic | ICD-10-CM

## 2019-11-19 PROCEDURE — 19081 BX BREAST 1ST LESION STRTCTC: CPT | Mod: LT

## 2019-11-19 PROCEDURE — 88341 IMHCHEM/IMCYTCHM EA ADD ANTB: CPT | Mod: 26

## 2019-11-19 PROCEDURE — 88342 IMHCHEM/IMCYTCHM 1ST ANTB: CPT | Mod: 26

## 2019-11-19 PROCEDURE — 88305 TISSUE EXAM BY PATHOLOGIST: CPT | Mod: 26

## 2019-11-21 LAB — SURGICAL PATHOLOGY STUDY: SIGNIFICANT CHANGE UP

## 2019-11-25 DIAGNOSIS — N60.22 FIBROADENOSIS OF LEFT BREAST: ICD-10-CM

## 2019-11-25 DIAGNOSIS — N63.20 UNSPECIFIED LUMP IN THE LEFT BREAST, UNSPECIFIED QUADRANT: ICD-10-CM

## 2019-11-25 DIAGNOSIS — N60.92 UNSPECIFIED BENIGN MAMMARY DYSPLASIA OF LEFT BREAST: ICD-10-CM

## 2020-01-17 ENCOUNTER — OUTPATIENT (OUTPATIENT)
Dept: OUTPATIENT SERVICES | Facility: HOSPITAL | Age: 75
LOS: 1 days | Discharge: HOME | End: 2020-01-17
Payer: MEDICARE

## 2020-01-17 VITALS
SYSTOLIC BLOOD PRESSURE: 149 MMHG | HEIGHT: 64 IN | DIASTOLIC BLOOD PRESSURE: 84 MMHG | TEMPERATURE: 98 F | RESPIRATION RATE: 16 BRPM | HEART RATE: 84 BPM | OXYGEN SATURATION: 99 % | WEIGHT: 176.59 LBS

## 2020-01-17 DIAGNOSIS — D24.2 BENIGN NEOPLASM OF LEFT BREAST: ICD-10-CM

## 2020-01-17 DIAGNOSIS — E78.00 PURE HYPERCHOLESTEROLEMIA, UNSPECIFIED: ICD-10-CM

## 2020-01-17 DIAGNOSIS — L90.5 SCAR CONDITIONS AND FIBROSIS OF SKIN: ICD-10-CM

## 2020-01-17 DIAGNOSIS — Z01.818 ENCOUNTER FOR OTHER PREPROCEDURAL EXAMINATION: ICD-10-CM

## 2020-01-17 DIAGNOSIS — I10 ESSENTIAL (PRIMARY) HYPERTENSION: ICD-10-CM

## 2020-01-17 DIAGNOSIS — Z90.11 ACQUIRED ABSENCE OF RIGHT BREAST AND NIPPLE: Chronic | ICD-10-CM

## 2020-01-17 LAB
APTT BLD: 28.2 SEC — SIGNIFICANT CHANGE UP (ref 27–39.2)
INR BLD: 1.03 RATIO — SIGNIFICANT CHANGE UP (ref 0.65–1.3)
PROTHROM AB SERPL-ACNC: 11.8 SEC — SIGNIFICANT CHANGE UP (ref 9.95–12.87)

## 2020-01-17 PROCEDURE — 93010 ELECTROCARDIOGRAM REPORT: CPT

## 2020-01-17 PROCEDURE — 71046 X-RAY EXAM CHEST 2 VIEWS: CPT | Mod: 26

## 2020-01-17 RX ORDER — L.ACIDOPH/B.ANIMALIS/B.LONGUM 15B CELL
1 CAPSULE ORAL
Qty: 0 | Refills: 0 | DISCHARGE

## 2020-01-17 NOTE — H&P PST ADULT - REASON FOR ADMISSION
74 y.o. female here at [facility] for left breast biopsy with needle localization with Dr. Mathieu Monte scheduled for 01/24/20; PT had right mastectomy is 1989  Last labs are from 11/04/29: CR-0.9; BUN-18; WBC-8.48; Hg-14.3; PLT-194; RBC-4.65    Denies: CP, SOB, Dysuria, heart palpitations; she can go up 1-2 flights of stairs; JOHN assessed--no issues  -Devices use:  -Partials/dentures/loose teeth:   -Use of Oxygen:  -Implanted devices/catheters: 74 y.o. female here at [facility] for left breast biopsy with needle localization with Dr. Mathieu Monte scheduled for 01/24/20; PT had right mastectomy is 1989  Pt went for a mammogram and they saw a suspicious area; pt stated she was not able to feel the lump it was picked up on mammogram; she had two mammograms done and a biopsy was done--it was negative but had suspicious cells    -PT had murmur since childhood; she sees Cardiologist Dr. Stout; pt was treated for sinusitis this week and finished up Z-TEODORO--she went to [facility]; she started taking a probiotic while on ZPAK      -PT is also complaining of small lipoma on right shoulder (golf-ball sized); she stated she was going to ask surgeon about possible removal; PT had anesthesia in past but denies any reactions; pt denies any history of blood transfusion; denies history of Hepatitis   Last labs are from 11/04/29: CR-0.9; BUN-18; WBC-8.48; Hg-14.3; PLT-194; RBC-4.65    Denies: CP, SOB, Dysuria, heart palpitations; she can go up 1-2 flights of stairs; JOHN assessed--no issues  -Devices use: no  -Partials/dentures/loose teeth: no  -Use of Oxygen: no  -Implanted devices/catheters: no

## 2020-01-17 NOTE — H&P PST ADULT - NSICDXFAMILYHX_GEN_ALL_CORE_FT
FAMILY HISTORY:  Father  Still living? No  Family history of rectal cancer, Age at diagnosis: Age Unknown    Mother  Still living? No  Family history of breast cancer, Age at diagnosis: Age Unknown

## 2020-01-17 NOTE — H&P PST ADULT - NSICDXPASTMEDICALHX_GEN_ALL_CORE_FT
PAST MEDICAL HISTORY:  Cancer BREAST   MASTECTOMY RIGHT  CHEMO RADIATION    HTN (hypertension)     Hypercholesteremia     Murmur     TIA (transient ischemic attack) 2016

## 2020-01-17 NOTE — H&P PST ADULT - ADDITIONAL PE
Left breast mass not palpable; right mastectomy; lower extremity PVD changes but no calf tenderness/redness; kyphosis present

## 2020-01-24 ENCOUNTER — RESULT REVIEW (OUTPATIENT)
Age: 75
End: 2020-01-24

## 2020-01-24 ENCOUNTER — TRANSCRIPTION ENCOUNTER (OUTPATIENT)
Age: 75
End: 2020-01-24

## 2020-01-24 ENCOUNTER — OUTPATIENT (OUTPATIENT)
Dept: OUTPATIENT SERVICES | Facility: HOSPITAL | Age: 75
LOS: 1 days | Discharge: HOME | End: 2020-01-24
Payer: MEDICARE

## 2020-01-24 VITALS — DIASTOLIC BLOOD PRESSURE: 61 MMHG | RESPIRATION RATE: 18 BRPM | HEART RATE: 73 BPM | SYSTOLIC BLOOD PRESSURE: 126 MMHG

## 2020-01-24 VITALS
DIASTOLIC BLOOD PRESSURE: 81 MMHG | SYSTOLIC BLOOD PRESSURE: 149 MMHG | HEIGHT: 64 IN | RESPIRATION RATE: 20 BRPM | HEART RATE: 82 BPM | TEMPERATURE: 98 F | WEIGHT: 169.98 LBS

## 2020-01-24 DIAGNOSIS — Z90.11 ACQUIRED ABSENCE OF RIGHT BREAST AND NIPPLE: Chronic | ICD-10-CM

## 2020-01-24 PROCEDURE — 19281 PERQ DEVICE BREAST 1ST IMAG: CPT | Mod: LT

## 2020-01-24 PROCEDURE — 88305 TISSUE EXAM BY PATHOLOGIST: CPT | Mod: 26

## 2020-01-24 RX ORDER — ONDANSETRON 8 MG/1
4 TABLET, FILM COATED ORAL
Refills: 0 | Status: DISCONTINUED | OUTPATIENT
Start: 2020-01-24 | End: 2020-01-24

## 2020-01-24 RX ORDER — HYDROMORPHONE HYDROCHLORIDE 2 MG/ML
0.5 INJECTION INTRAMUSCULAR; INTRAVENOUS; SUBCUTANEOUS
Refills: 0 | Status: DISCONTINUED | OUTPATIENT
Start: 2020-01-24 | End: 2020-01-24

## 2020-01-24 RX ORDER — MEPERIDINE HYDROCHLORIDE 50 MG/ML
12.5 INJECTION INTRAMUSCULAR; INTRAVENOUS; SUBCUTANEOUS
Refills: 0 | Status: DISCONTINUED | OUTPATIENT
Start: 2020-01-24 | End: 2020-01-24

## 2020-01-24 RX ORDER — HYDROMORPHONE HYDROCHLORIDE 2 MG/ML
1 INJECTION INTRAMUSCULAR; INTRAVENOUS; SUBCUTANEOUS
Refills: 0 | Status: DISCONTINUED | OUTPATIENT
Start: 2020-01-24 | End: 2020-01-24

## 2020-01-24 RX ORDER — SODIUM CHLORIDE 9 MG/ML
1000 INJECTION, SOLUTION INTRAVENOUS
Refills: 0 | Status: DISCONTINUED | OUTPATIENT
Start: 2020-01-24 | End: 2020-01-24

## 2020-01-24 RX ADMIN — SODIUM CHLORIDE 150 MILLILITER(S): 9 INJECTION, SOLUTION INTRAVENOUS at 10:07

## 2020-01-24 NOTE — ASU DISCHARGE PLAN (ADULT/PEDIATRIC) - CARE PROVIDER_API CALL
Mathieu Monte)  Surgery; Surgical Critical Care  [address] [place] [address]  Phone: [phone]  Fax: [phone]  Established Patient  Follow Up Time: 1 week

## 2020-01-24 NOTE — PRE-ANESTHESIA EVALUATION ADULT - NSANTHADDINFOFT_GEN_ALL_CORE
73 y/o WF evaluated for excision of left breast mass.  ASA 2.  Plan GA.  Plan, benefits, foreseeable risks, viable alternatives discussed with patient and all her pertinent questions answered and she understands and elects to proceed.

## 2020-01-31 LAB — SURGICAL PATHOLOGY STUDY: SIGNIFICANT CHANGE UP

## 2020-02-03 DIAGNOSIS — I10 ESSENTIAL (PRIMARY) HYPERTENSION: ICD-10-CM

## 2020-02-03 DIAGNOSIS — Z88.0 ALLERGY STATUS TO PENICILLIN: ICD-10-CM

## 2020-02-03 DIAGNOSIS — Z92.21 PERSONAL HISTORY OF ANTINEOPLASTIC CHEMOTHERAPY: ICD-10-CM

## 2020-02-03 DIAGNOSIS — Z92.3 PERSONAL HISTORY OF IRRADIATION: ICD-10-CM

## 2020-02-03 DIAGNOSIS — Z86.73 PERSONAL HISTORY OF TRANSIENT ISCHEMIC ATTACK (TIA), AND CEREBRAL INFARCTION WITHOUT RESIDUAL DEFICITS: ICD-10-CM

## 2020-02-03 DIAGNOSIS — L90.5 SCAR CONDITIONS AND FIBROSIS OF SKIN: ICD-10-CM

## 2020-02-03 DIAGNOSIS — R01.1 CARDIAC MURMUR, UNSPECIFIED: ICD-10-CM

## 2020-02-03 DIAGNOSIS — D24.2 BENIGN NEOPLASM OF LEFT BREAST: ICD-10-CM

## 2020-02-03 DIAGNOSIS — Z85.3 PERSONAL HISTORY OF MALIGNANT NEOPLASM OF BREAST: ICD-10-CM

## 2020-02-03 DIAGNOSIS — N63.20 UNSPECIFIED LUMP IN THE LEFT BREAST, UNSPECIFIED QUADRANT: ICD-10-CM

## 2020-02-03 DIAGNOSIS — Z79.82 LONG TERM (CURRENT) USE OF ASPIRIN: ICD-10-CM

## 2020-02-03 DIAGNOSIS — E78.00 PURE HYPERCHOLESTEROLEMIA, UNSPECIFIED: ICD-10-CM

## 2020-08-19 ENCOUNTER — OUTPATIENT (OUTPATIENT)
Dept: OUTPATIENT SERVICES | Facility: HOSPITAL | Age: 75
LOS: 1 days | Discharge: HOME | End: 2020-08-19
Payer: MEDICARE

## 2020-08-19 DIAGNOSIS — R10.9 UNSPECIFIED ABDOMINAL PAIN: ICD-10-CM

## 2020-08-19 DIAGNOSIS — Z90.11 ACQUIRED ABSENCE OF RIGHT BREAST AND NIPPLE: Chronic | ICD-10-CM

## 2020-08-19 PROCEDURE — 74176 CT ABD & PELVIS W/O CONTRAST: CPT | Mod: 26

## 2020-08-21 NOTE — ASU PREOP CHECKLIST - ORDERS/MEDICATION ADMINISTRATION RECORD ON CHART
done Complex Repair And Melolabial Flap Text: The defect edges were debeveled with a #15 scalpel blade.  The primary defect was closed partially with a complex linear closure.  Given the location of the remaining defect, shape of the defect and the proximity to free margins a melolabial flap was deemed most appropriate for complete closure of the defect.  Using a sterile surgical marker, an appropriate advancement flap was drawn incorporating the defect and placing the expected incisions within the relaxed skin tension lines where possible.    The area thus outlined was incised deep to adipose tissue with a #15 scalpel blade.  The skin margins were undermined to an appropriate distance in all directions utilizing iris scissors.

## 2020-09-10 ENCOUNTER — OUTPATIENT (OUTPATIENT)
Dept: OUTPATIENT SERVICES | Facility: HOSPITAL | Age: 75
LOS: 1 days | Discharge: HOME | End: 2020-09-10

## 2020-09-10 ENCOUNTER — LABORATORY RESULT (OUTPATIENT)
Age: 75
End: 2020-09-10

## 2020-09-10 ENCOUNTER — APPOINTMENT (OUTPATIENT)
Dept: HEMATOLOGY ONCOLOGY | Facility: CLINIC | Age: 75
End: 2020-09-10
Payer: MEDICARE

## 2020-09-10 VITALS
SYSTOLIC BLOOD PRESSURE: 160 MMHG | HEART RATE: 82 BPM | WEIGHT: 170 LBS | DIASTOLIC BLOOD PRESSURE: 74 MMHG | TEMPERATURE: 96.9 F | RESPIRATION RATE: 14 BRPM | HEIGHT: 64 IN | BODY MASS INDEX: 29.02 KG/M2

## 2020-09-10 DIAGNOSIS — Z90.11 ACQUIRED ABSENCE OF RIGHT BREAST AND NIPPLE: Chronic | ICD-10-CM

## 2020-09-10 LAB
ALBUMIN SERPL ELPH-MCNC: 4.5 G/DL
ALP BLD-CCNC: 58 U/L
ALT SERPL-CCNC: 13 U/L
ANION GAP SERPL CALC-SCNC: 12 MMOL/L
AST SERPL-CCNC: 15 U/L
BILIRUB SERPL-MCNC: 0.5 MG/DL
BUN SERPL-MCNC: 16 MG/DL
CALCIUM SERPL-MCNC: 10.1 MG/DL
CHLORIDE SERPL-SCNC: 100 MMOL/L
CO2 SERPL-SCNC: 26 MMOL/L
CREAT SERPL-MCNC: 0.8 MG/DL
GLUCOSE SERPL-MCNC: 95 MG/DL
HCT VFR BLD CALC: 42.9 %
HGB BLD-MCNC: 14.1 G/DL
LDH SERPL-CCNC: 183
MCHC RBC-ENTMCNC: 29.9 PG
MCHC RBC-ENTMCNC: 32.9 G/DL
MCV RBC AUTO: 91.1 FL
PLATELET # BLD AUTO: 195 K/UL
PMV BLD: 11.6 FL
POTASSIUM SERPL-SCNC: 5 MMOL/L
PROT SERPL-MCNC: 7 G/DL
RBC # BLD: 4.71 M/UL
RBC # FLD: 12.5 %
SODIUM SERPL-SCNC: 138 MMOL/L
WBC # FLD AUTO: 7.72 K/UL

## 2020-09-10 PROCEDURE — 99213 OFFICE O/P EST LOW 20 MIN: CPT

## 2020-09-11 DIAGNOSIS — Z85.3 PERSONAL HISTORY OF MALIGNANT NEOPLASM OF BREAST: ICD-10-CM

## 2020-09-11 NOTE — REVIEW OF SYSTEMS
[Recent Change In Weight] : ~T recent weight change [Abdominal Pain] : abdominal pain [Negative] : Respiratory [FreeTextEntry2] : May be she gained a few pounds

## 2020-09-11 NOTE — HISTORY OF PRESENT ILLNESS
[Disease: _____________________] : Disease: [unfilled] [AJCC Stage: ____] : AJCC Stage: [unfilled] [de-identified] : Invasive [de-identified] : The patient is coming for her regularly scheduled yearly follow up a little earlier than planned because of a new pain at the left lower trunk which lasted one day.\par However, since then, she had also developed lower abdominal discomfort. She saw a gastroenterologist who recommended observation before a trial of antibiotherapy. The pain went away in one day but she still feels as if something is "inflamed". The sensation is continuing and she is scheduled for a colonoscopy on the 28th of this month.\par The patient is denying any nausea or vomiting. She had loose stools for a few days, then they "turned yellow", and now they're back to "normal".\par She is denying any fever or night sweats. No cough or shortness of breath. No problems with the urine. No headache or dizziness. She has not felt any lumps or bumps. The appetite is good. She might have gained weight actually.\par She is on no new medications. \par She has not had her Pap smear which was due this past May. She will recontact her gynecologist after the planned colonoscopy.\par She was seen by her cardiologist recently and had blood work was drawn 2 days ago. \par This past January she had another breast biopsy on the right. That turned out to be a benign lesion. [de-identified] : ER/AL negative

## 2020-09-11 NOTE — ASSESSMENT
[FreeTextEntry1] : 1. History of breast carcinoma, stage II-III, ER/MS negative, diagnosed and treated with mastectomy followed by chemotherapy almost 27-28 years ago, at this time, no evidence of disease clinically.\par 2. Lower abdominal discomfort secondary to ?sclerosing mesenteritis? Due for a follow up with her gastroenterologist.\par 3. History of squamous cell carcinoma (vs ?dysplasia?) of the left hand, resected. Being followed by her dermatologist.\par 4. Lipomas, especially a large one on the posterior upper back. The plan is apparently to remove it within the near future.\par \par CBC, CMP will be obtained. The patient will have a repeat mammogram, recommended by her surgeon, at 6 months from the last biopsy (she is due for it).\par Further recommendations after the above completed.\par \par If all unrelated to any malignancy, the patient will be seen again in a year for follow up.\par \par All questions answered.

## 2020-09-11 NOTE — PHYSICAL EXAM
[Fully active, able to carry on all pre-disease performance without restriction] : Status 0 - Fully active, able to carry on all pre-disease performance without restriction [Normal] : affect appropriate [de-identified] : But somewhat overweight [de-identified] : But varicosities noted [de-identified] : S/P right mastectomy, no evidence of local recurrence. Left breast significant only for a surgical scar. [de-identified] : Ventral hernia present [de-identified] : Posterior neck soft tissue mass, growing, most likely another lipoma [de-identified] : Mild arthritic changes

## 2020-09-11 NOTE — CONSULT LETTER
[Dear  ___] : Dear ~PAM, [Courtesy Letter:] : I had the pleasure of seeing your patient, [unfilled], in my office today. [Please see my note below.] : Please see my note below. [Consult Closing:] : Thank you very much for allowing me to participate in the care of this patient.  If you have any questions, please do not hesitate to contact me. [Sincerely,] : Sincerely, [FreeTextEntry2] : Dr. YUILYA Monte [FreeTextEntry3] : Dr. MADELYN Ramos

## 2020-11-12 ENCOUNTER — RESULT REVIEW (OUTPATIENT)
Age: 75
End: 2020-11-12

## 2020-11-12 ENCOUNTER — OUTPATIENT (OUTPATIENT)
Dept: OUTPATIENT SERVICES | Facility: HOSPITAL | Age: 75
LOS: 1 days | Discharge: HOME | End: 2020-11-12
Payer: MEDICARE

## 2020-11-12 DIAGNOSIS — Z12.31 ENCOUNTER FOR SCREENING MAMMOGRAM FOR MALIGNANT NEOPLASM OF BREAST: ICD-10-CM

## 2020-11-12 DIAGNOSIS — Z90.11 ACQUIRED ABSENCE OF RIGHT BREAST AND NIPPLE: Chronic | ICD-10-CM

## 2020-11-12 PROCEDURE — 77067 SCR MAMMO BI INCL CAD: CPT | Mod: 26,52,LT

## 2020-11-24 ENCOUNTER — OUTPATIENT (OUTPATIENT)
Dept: OUTPATIENT SERVICES | Facility: HOSPITAL | Age: 75
LOS: 1 days | Discharge: HOME | End: 2020-11-24
Payer: MEDICARE

## 2020-11-24 ENCOUNTER — RESULT REVIEW (OUTPATIENT)
Age: 75
End: 2020-11-24

## 2020-11-24 DIAGNOSIS — Z90.11 ACQUIRED ABSENCE OF RIGHT BREAST AND NIPPLE: Chronic | ICD-10-CM

## 2020-11-24 DIAGNOSIS — R92.8 OTHER ABNORMAL AND INCONCLUSIVE FINDINGS ON DIAGNOSTIC IMAGING OF BREAST: ICD-10-CM

## 2020-11-24 PROCEDURE — G0279: CPT | Mod: 26

## 2020-11-24 PROCEDURE — 76642 ULTRASOUND BREAST LIMITED: CPT | Mod: 26,LT

## 2020-11-24 PROCEDURE — 77065 DX MAMMO INCL CAD UNI: CPT | Mod: 26,LT

## 2020-12-02 ENCOUNTER — RESULT REVIEW (OUTPATIENT)
Age: 75
End: 2020-12-02

## 2020-12-02 ENCOUNTER — OUTPATIENT (OUTPATIENT)
Dept: OUTPATIENT SERVICES | Facility: HOSPITAL | Age: 75
LOS: 1 days | Discharge: HOME | End: 2020-12-02
Payer: MEDICARE

## 2020-12-02 DIAGNOSIS — D05.12 INTRADUCTAL CARCINOMA IN SITU OF LEFT BREAST: ICD-10-CM

## 2020-12-02 DIAGNOSIS — Z90.11 ACQUIRED ABSENCE OF RIGHT BREAST AND NIPPLE: Chronic | ICD-10-CM

## 2020-12-02 PROCEDURE — 77065 DX MAMMO INCL CAD UNI: CPT | Mod: 26,LT

## 2020-12-02 PROCEDURE — 19083 BX BREAST 1ST LESION US IMAG: CPT | Mod: LT

## 2020-12-02 PROCEDURE — 88305 TISSUE EXAM BY PATHOLOGIST: CPT | Mod: 26

## 2020-12-02 PROCEDURE — 88360 TUMOR IMMUNOHISTOCHEM/MANUAL: CPT | Mod: 26

## 2020-12-02 PROCEDURE — 88342 IMHCHEM/IMCYTCHM 1ST ANTB: CPT | Mod: 26,59

## 2020-12-02 PROCEDURE — 88341 IMHCHEM/IMCYTCHM EA ADD ANTB: CPT | Mod: 26,59

## 2020-12-03 LAB — SURGICAL PATHOLOGY STUDY: SIGNIFICANT CHANGE UP

## 2020-12-07 DIAGNOSIS — C50.912 MALIGNANT NEOPLASM OF UNSPECIFIED SITE OF LEFT FEMALE BREAST: ICD-10-CM

## 2020-12-10 ENCOUNTER — APPOINTMENT (OUTPATIENT)
Dept: HEMATOLOGY ONCOLOGY | Facility: CLINIC | Age: 75
End: 2020-12-10
Payer: MEDICARE

## 2020-12-10 VITALS
DIASTOLIC BLOOD PRESSURE: 74 MMHG | TEMPERATURE: 97.6 F | WEIGHT: 173 LBS | HEART RATE: 93 BPM | SYSTOLIC BLOOD PRESSURE: 170 MMHG | HEIGHT: 64 IN | BODY MASS INDEX: 29.53 KG/M2

## 2020-12-10 PROCEDURE — 99213 OFFICE O/P EST LOW 20 MIN: CPT

## 2020-12-10 NOTE — HISTORY OF PRESENT ILLNESS
[de-identified] : The patient came today to discuss the results of her recent left breast biopsy.\par Although, originally, the report stated DCIS, however, further evaluation showed an invasive ductal carcinoma, ER/GA negative, Ki-67 80%. Not clear what the status of HER2 was.\par The situation was discussed with the patient. On the mammogram, the tumor was located at 2:00 o'clock and measured 0.8 cm in the largest dimension. \par She was reassured that it was a small tumor. Regardless, she was told it had to come out since it was an invasive one and looked aggressive with the elevated Ki-67 and ER/GA negative status. \par She was recommended to see her surgeon, Dr. YULIYA Monte.\par At this time, the most important step is the lumpectomy and axillary lymph node sampling.\par Further recommendations about systemic therapy after the surgery. The patient is leaning toward mastectomy rather than lumpectomy given her past medical history.\par \par All questions answered.

## 2020-12-10 NOTE — CONSULT LETTER
[Dear  ___] : Dear ~PAM, [Courtesy Letter:] : I had the pleasure of seeing your patient, [unfilled], in my office today. [Please see my note below.] : Please see my note below. [Consult Closing:] : Thank you very much for allowing me to participate in the care of this patient.  If you have any questions, please do not hesitate to contact me. [Sincerely,] : Sincerely, [FreeTextEntry2] : Dr. Chris Monte [FreeTextEntry3] : Dr. MADELYN Ramos

## 2020-12-29 ENCOUNTER — OUTPATIENT (OUTPATIENT)
Dept: OUTPATIENT SERVICES | Facility: HOSPITAL | Age: 75
LOS: 1 days | Discharge: HOME | End: 2020-12-29
Payer: MEDICARE

## 2020-12-29 VITALS
WEIGHT: 169.98 LBS | OXYGEN SATURATION: 98 % | HEIGHT: 64 IN | HEART RATE: 85 BPM | TEMPERATURE: 98 F | DIASTOLIC BLOOD PRESSURE: 91 MMHG | SYSTOLIC BLOOD PRESSURE: 189 MMHG | RESPIRATION RATE: 16 BRPM

## 2020-12-29 DIAGNOSIS — Z01.818 ENCOUNTER FOR OTHER PREPROCEDURAL EXAMINATION: ICD-10-CM

## 2020-12-29 DIAGNOSIS — Z98.890 OTHER SPECIFIED POSTPROCEDURAL STATES: Chronic | ICD-10-CM

## 2020-12-29 DIAGNOSIS — Z90.11 ACQUIRED ABSENCE OF RIGHT BREAST AND NIPPLE: Chronic | ICD-10-CM

## 2020-12-29 LAB
ALBUMIN SERPL ELPH-MCNC: 4.4 G/DL — SIGNIFICANT CHANGE UP (ref 3.5–5.2)
ALP SERPL-CCNC: 65 U/L — SIGNIFICANT CHANGE UP (ref 30–115)
ALT FLD-CCNC: 13 U/L — SIGNIFICANT CHANGE UP (ref 0–41)
ANION GAP SERPL CALC-SCNC: 14 MMOL/L — SIGNIFICANT CHANGE UP (ref 7–14)
APTT BLD: 26.6 SEC — LOW (ref 27–39.2)
AST SERPL-CCNC: 14 U/L — SIGNIFICANT CHANGE UP (ref 0–41)
BASOPHILS # BLD AUTO: 0.04 K/UL — SIGNIFICANT CHANGE UP (ref 0–0.2)
BASOPHILS NFR BLD AUTO: 0.5 % — SIGNIFICANT CHANGE UP (ref 0–1)
BILIRUB SERPL-MCNC: 0.3 MG/DL — SIGNIFICANT CHANGE UP (ref 0.2–1.2)
BUN SERPL-MCNC: 14 MG/DL — SIGNIFICANT CHANGE UP (ref 10–20)
CALCIUM SERPL-MCNC: 9.4 MG/DL — SIGNIFICANT CHANGE UP (ref 8.5–10.1)
CHLORIDE SERPL-SCNC: 101 MMOL/L — SIGNIFICANT CHANGE UP (ref 98–110)
CO2 SERPL-SCNC: 23 MMOL/L — SIGNIFICANT CHANGE UP (ref 17–32)
CREAT SERPL-MCNC: 0.7 MG/DL — SIGNIFICANT CHANGE UP (ref 0.7–1.5)
EOSINOPHIL # BLD AUTO: 0.12 K/UL — SIGNIFICANT CHANGE UP (ref 0–0.7)
EOSINOPHIL NFR BLD AUTO: 1.6 % — SIGNIFICANT CHANGE UP (ref 0–8)
GLUCOSE SERPL-MCNC: 86 MG/DL — SIGNIFICANT CHANGE UP (ref 70–99)
HCT VFR BLD CALC: 43.6 % — SIGNIFICANT CHANGE UP (ref 37–47)
HGB BLD-MCNC: 14 G/DL — SIGNIFICANT CHANGE UP (ref 12–16)
IMM GRANULOCYTES NFR BLD AUTO: 0.3 % — SIGNIFICANT CHANGE UP (ref 0.1–0.3)
INR BLD: 1.03 RATIO — SIGNIFICANT CHANGE UP (ref 0.65–1.3)
LYMPHOCYTES # BLD AUTO: 2.94 K/UL — SIGNIFICANT CHANGE UP (ref 1.2–3.4)
LYMPHOCYTES # BLD AUTO: 38.7 % — SIGNIFICANT CHANGE UP (ref 20.5–51.1)
MCHC RBC-ENTMCNC: 29.2 PG — SIGNIFICANT CHANGE UP (ref 27–31)
MCHC RBC-ENTMCNC: 32.1 G/DL — SIGNIFICANT CHANGE UP (ref 32–37)
MCV RBC AUTO: 91 FL — SIGNIFICANT CHANGE UP (ref 81–99)
MONOCYTES # BLD AUTO: 0.58 K/UL — SIGNIFICANT CHANGE UP (ref 0.1–0.6)
MONOCYTES NFR BLD AUTO: 7.6 % — SIGNIFICANT CHANGE UP (ref 1.7–9.3)
NEUTROPHILS # BLD AUTO: 3.9 K/UL — SIGNIFICANT CHANGE UP (ref 1.4–6.5)
NEUTROPHILS NFR BLD AUTO: 51.3 % — SIGNIFICANT CHANGE UP (ref 42.2–75.2)
NRBC # BLD: 0 /100 WBCS — SIGNIFICANT CHANGE UP (ref 0–0)
PLATELET # BLD AUTO: 213 K/UL — SIGNIFICANT CHANGE UP (ref 130–400)
POTASSIUM SERPL-MCNC: 4.5 MMOL/L — SIGNIFICANT CHANGE UP (ref 3.5–5)
POTASSIUM SERPL-SCNC: 4.5 MMOL/L — SIGNIFICANT CHANGE UP (ref 3.5–5)
PROT SERPL-MCNC: 7.1 G/DL — SIGNIFICANT CHANGE UP (ref 6–8)
PROTHROM AB SERPL-ACNC: 11.8 SEC — SIGNIFICANT CHANGE UP (ref 9.95–12.87)
RBC # BLD: 4.79 M/UL — SIGNIFICANT CHANGE UP (ref 4.2–5.4)
RBC # FLD: 13.3 % — SIGNIFICANT CHANGE UP (ref 11.5–14.5)
SODIUM SERPL-SCNC: 138 MMOL/L — SIGNIFICANT CHANGE UP (ref 135–146)
WBC # BLD: 7.6 K/UL — SIGNIFICANT CHANGE UP (ref 4.8–10.8)
WBC # FLD AUTO: 7.6 K/UL — SIGNIFICANT CHANGE UP (ref 4.8–10.8)

## 2020-12-29 PROCEDURE — 71046 X-RAY EXAM CHEST 2 VIEWS: CPT | Mod: 26

## 2020-12-29 PROCEDURE — 93010 ELECTROCARDIOGRAM REPORT: CPT

## 2020-12-29 RX ORDER — L.ACIDOPH/B.ANIMALIS/B.LONGUM 15B CELL
1 CAPSULE ORAL
Qty: 0 | Refills: 0 | DISCHARGE

## 2020-12-29 RX ORDER — FLUTICASONE PROPIONATE 50 MCG
1 SPRAY, SUSPENSION NASAL
Qty: 0 | Refills: 0 | DISCHARGE

## 2020-12-29 NOTE — H&P PST ADULT - REASON FOR ADMISSION
74 yo female presents for PAST in preparation for left breast modified radical mastectomy with axillary node dissection on 1/15/2021 under general anesthesia by Dr. Monte

## 2020-12-29 NOTE — H&P PST ADULT - NSICDXPASTMEDICALHX_GEN_ALL_CORE_FT
PAST MEDICAL HISTORY:  Breast CA Left    Cancer BREAST   MASTECTOMY RIGHT  CHEMO RADIATION    HTN (hypertension)     Hypercholesteremia     Murmur     TIA (transient ischemic attack) 2016     PAST MEDICAL HISTORY:  Breast CA Left    Cancer BREAST   MASTECTOMY RIGHT  CHEMO RADIATION    HTN (hypertension)     Hypercholesteremia     Murmur     Pre-diabetes     TIA (transient ischemic attack) 2016

## 2020-12-29 NOTE — H&P PST ADULT - HISTORY OF PRESENT ILLNESS
Pt states she went for her annual mammogram when it was noted she had abnormal cells which were later determined to be DCIS. Pt now having left breast surgical removed. Denies any symptoms. Denies any chest pain, difficulty breathing, SOB, palpitations, dysuria, URI, or any other infections in the last 2 weeks. Denies any recent travel, contact, or exposure to any persons with known or suspected COVID-19. Pt also denies COVID testing within the last 2 weeks. Denies any suicidal or homicidal ideations. Pt advised to self quarantine until day of procedure. Exercise tolerance of 1 flight of stairs without dyspnea. JOHN reviewed with patient. Pt verbalized understanding of all pre-operative instructions.    Anesthesia Alert  YES--Difficult Airway: Class IV  NO--History of neck surgery or radiation  YES--Limited ROM of neck  NO--History of Malignant hyperthermia  NO--No personal or family history of Pseudocholinesterase deficiency.  NO--Prior Anesthesia Complication  NO--Latex Allergy  NO--Loose teeth  NO--History of Rheumatoid Arthritis  NO--JOHN  NO--Other_____

## 2020-12-30 DIAGNOSIS — C50.412 MALIGNANT NEOPLASM OF UPPER-OUTER QUADRANT OF LEFT FEMALE BREAST: ICD-10-CM

## 2020-12-30 LAB
A1C WITH ESTIMATED AVERAGE GLUCOSE RESULT: 6 % — HIGH (ref 4–5.6)
ESTIMATED AVERAGE GLUCOSE: 126 MG/DL — HIGH (ref 68–114)

## 2021-01-12 ENCOUNTER — OUTPATIENT (OUTPATIENT)
Dept: OUTPATIENT SERVICES | Facility: HOSPITAL | Age: 76
LOS: 1 days | Discharge: HOME | End: 2021-01-12

## 2021-01-12 DIAGNOSIS — Z11.59 ENCOUNTER FOR SCREENING FOR OTHER VIRAL DISEASES: ICD-10-CM

## 2021-01-12 DIAGNOSIS — Z98.890 OTHER SPECIFIED POSTPROCEDURAL STATES: Chronic | ICD-10-CM

## 2021-01-12 DIAGNOSIS — Z90.11 ACQUIRED ABSENCE OF RIGHT BREAST AND NIPPLE: Chronic | ICD-10-CM

## 2021-01-12 PROBLEM — R73.03 PREDIABETES: Chronic | Status: ACTIVE | Noted: 2020-12-29

## 2021-01-15 ENCOUNTER — RESULT REVIEW (OUTPATIENT)
Age: 76
End: 2021-01-15

## 2021-01-15 ENCOUNTER — OUTPATIENT (OUTPATIENT)
Dept: OUTPATIENT SERVICES | Facility: HOSPITAL | Age: 76
LOS: 1 days | Discharge: HOME | End: 2021-01-15
Payer: MEDICARE

## 2021-01-15 VITALS
HEIGHT: 64 IN | SYSTOLIC BLOOD PRESSURE: 135 MMHG | WEIGHT: 169.98 LBS | RESPIRATION RATE: 18 BRPM | HEART RATE: 62 BPM | OXYGEN SATURATION: 99 % | DIASTOLIC BLOOD PRESSURE: 60 MMHG | TEMPERATURE: 98 F

## 2021-01-15 VITALS
SYSTOLIC BLOOD PRESSURE: 154 MMHG | HEART RATE: 58 BPM | DIASTOLIC BLOOD PRESSURE: 70 MMHG | RESPIRATION RATE: 18 BRPM | TEMPERATURE: 98 F | OXYGEN SATURATION: 97 %

## 2021-01-15 DIAGNOSIS — Z98.890 OTHER SPECIFIED POSTPROCEDURAL STATES: Chronic | ICD-10-CM

## 2021-01-15 DIAGNOSIS — Z90.11 ACQUIRED ABSENCE OF RIGHT BREAST AND NIPPLE: Chronic | ICD-10-CM

## 2021-01-15 PROCEDURE — 88341 IMHCHEM/IMCYTCHM EA ADD ANTB: CPT | Mod: 26

## 2021-01-15 PROCEDURE — 88342 IMHCHEM/IMCYTCHM 1ST ANTB: CPT | Mod: 26

## 2021-01-15 PROCEDURE — 88309 TISSUE EXAM BY PATHOLOGIST: CPT | Mod: 26

## 2021-01-15 RX ORDER — ONDANSETRON 8 MG/1
4 TABLET, FILM COATED ORAL ONCE
Refills: 0 | Status: DISCONTINUED | OUTPATIENT
Start: 2021-01-15 | End: 2021-01-29

## 2021-01-15 RX ORDER — MEPERIDINE HYDROCHLORIDE 50 MG/ML
12.5 INJECTION INTRAMUSCULAR; INTRAVENOUS; SUBCUTANEOUS ONCE
Refills: 0 | Status: DISCONTINUED | OUTPATIENT
Start: 2021-01-15 | End: 2021-01-15

## 2021-01-15 RX ORDER — ACETAMINOPHEN WITH CODEINE 300MG-30MG
1 TABLET ORAL
Qty: 20 | Refills: 0
Start: 2021-01-15 | End: 2021-01-19

## 2021-01-15 RX ORDER — OXYCODONE HYDROCHLORIDE 5 MG/1
5 TABLET ORAL ONCE
Refills: 0 | Status: DISCONTINUED | OUTPATIENT
Start: 2021-01-15 | End: 2021-01-15

## 2021-01-15 RX ORDER — SODIUM CHLORIDE 9 MG/ML
1000 INJECTION, SOLUTION INTRAVENOUS
Refills: 0 | Status: DISCONTINUED | OUTPATIENT
Start: 2021-01-15 | End: 2021-01-29

## 2021-01-15 RX ORDER — HYDROMORPHONE HYDROCHLORIDE 2 MG/ML
0.25 INJECTION INTRAMUSCULAR; INTRAVENOUS; SUBCUTANEOUS
Refills: 0 | Status: DISCONTINUED | OUTPATIENT
Start: 2021-01-15 | End: 2021-01-15

## 2021-01-15 RX ORDER — HYDROMORPHONE HYDROCHLORIDE 2 MG/ML
0.5 INJECTION INTRAMUSCULAR; INTRAVENOUS; SUBCUTANEOUS
Refills: 0 | Status: DISCONTINUED | OUTPATIENT
Start: 2021-01-15 | End: 2021-01-15

## 2021-01-15 NOTE — ASU DISCHARGE PLAN (ADULT/PEDIATRIC) - ASU DC SPECIAL INSTRUCTIONSFT
Diet: Continue your regular diet.  Dressings: Remove outside dressing in 2 days, OK to shower normally. Leave steri-strips in place, they will fall off on their own in 1 week.  Drains: Record amount and quality of drain outputs at least once daily.  Pain: Take ibuprofen 500mg and/or tylenol 650mg every 6-8 hours for at least the first 3 days after surgery. Take Tylenol #3 every 6-8 hours as needed for breakthrough pain.   Activity: Avoid heavy lifting (anything over 10 pounds) with your left arm for at least 6 weeks.   Follow up: Call to schedule a follow up appointment in 2 weeks.

## 2021-01-15 NOTE — BRIEF OPERATIVE NOTE - OPERATION/FINDINGS
left breast cancer s/p left modified radical mastectomy; no axillary lymph node dissection performed

## 2021-01-15 NOTE — CHART NOTE - NSCHARTNOTEFT_GEN_A_CORE
PACU ANESTHESIA ADMISSION NOTE      Procedure: Left modified radical mastectomy    Modified radical mastectomy      Post op diagnosis:  Breast cancer        ____  Intubated  TV:______       Rate: ______      FiO2: ______    _x___  Patent Airway    _x___  Full return of protective reflexes    __  Full recovery from anesthesia / back to baseline status    Vitals:  T(C): 36.4 (01-15-21 @ 10:22), Max: 36.4 (01-15-21 @ 10:18)  HR: 62 (01-15-21 @ 10:22) (62 - 62)  BP: 135/60 (01-15-21 @ 10:22) (135/60 - 135/60)  RR: 18 (01-15-21 @ 10:22) (18 - 18)  SpO2: 99% (01-15-21 @ 10:22) (99% - 99%)    Mental Status:  _x___ Awake   _____ Alert   _____ Drowsy   _____ Sedated    Nausea/Vomiting:  _x___  NO       ______Yes,   See Post - Op Orders         Pain Scale (0-10):  __0___    Treatment: _x___ None    ____ See Post - Op/PCA Orders    Post - Operative Fluids:   __x__ Oral   ____ See Post - Op Orders    Plan: Discharge:   _x___Home       _____Floor     _____Critical Care    _____  Other:_________________    Comments:  No anesthesia issues or complications noted.  Discharge when criteria met.

## 2021-01-15 NOTE — BRIEF OPERATIVE NOTE - NSICDXBRIEFOPLAUNCH_GEN_ALL_CORE
Progress Note - Cardiothoracic Surgery Critical Care   Teetee Burt 63 y o  male MRN: [id number]  Unit/Bed#: [facility] 416-01 Encounter: [id number]      Attending Physician: Nagi Kidd MD    24 Hour Events: POD # 1 s/p CABG x 3  Post op received 1L LR 250cc albumin for low CI with high SVR with good response, additional 250cc albumin overnight for low UOP  Weaned off epi overnight  Delined this AM  Maintained on ann marie  This AM dumped 130cc from meds and 140cc pleural with getting to chair  Allergies:    Allergies   Allergen Reactions    Metformin        Medications:   Scheduled Meds:  Current Facility-Administered Medications:  acetaminophen 650 mg Rectal Q4H PRN Shay Davis PA-C    acetaminophen 650 mg Oral Q4H PRN Shay Davis PA-C    amiodarone 200 mg Oral Q8H [facility] Shay Davis PA-C    aspirin 325 mg Oral Daily Shay Davis PA-C    atorvastatin 80 mg Oral Daily With NUNO Singh    bisacodyl 10 mg Rectal Daily PRN Shay Davis PA-C    calcium chloride 1 g Intravenous Once M D C  NUNO Lino    cefTRIAXone 1,000 mg Intravenous Q24H Azell Lundborg, MD Last Rate: 1,000 mg (03/28/18 1832)   chlorhexidine 15 mL Mouth/Throat BID Shay Davis PA-C    epinephrine 1-20 mcg/min Intravenous Titrated Korinaregan Ramos PA-C Last Rate: Stopped (03/28/18 1445)   fondaparinux 2 5 mg Subcutaneous Daily Shay Davis PA-C    furosemide 40 mg Intravenous Q6H PRN Shay Davis PA-C    HYDROmorphone 0 5 mg Intravenous Q1H PRN Shay Davis PA-C    HYDROmorphone 1 mg Intravenous Q1H PRN Shay Davis PA-C    insulin regular (HumuLIN R,NovoLIN R) infusion 0 3-21 Units/hr Intravenous Titrated Shay Davis PA-C Last Rate: Stopped (03/29/18 0001)   lactated ringers 500 mL Intravenous Q30 Min PRN Tyrone Davis PA-C Last Rate: Stopped (03/28/18 1730)   lidocaine (cardiac) 100 mg Intravenous Q30 Min PRN Shay Davis PA-C    mupirocin 1 application Nasal Q12H [address] Shay Davis PA-C    niCARdipine 2 5-15 mg/hr Intravenous Titrated Glenroy Wilkerson PA-C Last Rate: Stopped (03/28/18 1941)   ondansetron 4 mg Intravenous Q6H PRN Shay Davis PA-C    oxyCODONE-acetaminophen 1 tablet Oral Q4H PRN Glenroy Wilkerson PA-C    oxyCODONE-acetaminophen 2 tablet Oral Q6H PRN Shay Davis PA-C    pantoprazole 40 mg Oral Early Morning Shay Davis PA-C    phenylephine  mcg/min Intravenous Titrated Barrie Mathews PA-C Last Rate: 40 mcg/min (03/29/18 0400)   polyethylene glycol 17 g Oral Daily Shay Davis PA-C    potassium chloride 20 mEq Intravenous Once PRN Shay Davis PA-C    potassium chloride 20 mEq Intravenous Q1H PRN Glenroy Wilkerson PA-C Last Rate: Stopped (03/28/18 1530)   potassium chloride 20 mEq Intravenous Q30 Min PRN Shay Davis PA-C    sodium chloride 20 mL/hr Intravenous Continuous Glenroy Wilkerson PA-C Last Rate: 20 mL/hr (03/29/18 0400)       VTE Pharmacologic Prophylaxis: Fondaparinux (Arixtra)  VTE Mechanical Prophylaxis: sequential compression device    Continuous Infusions:  epinephrine 1-20 mcg/min Last Rate: Stopped (03/28/18 1445)   insulin regular (HumuLIN R,NovoLIN R) infusion 0 3-21 Units/hr Last Rate: Stopped (03/29/18 0001)   niCARdipine 2 5-15 mg/hr Last Rate: Stopped (03/28/18 1941)   phenylephine  mcg/min Last Rate: 40 mcg/min (03/29/18 0400)   sodium chloride 20 mL/hr Last Rate: 20 mL/hr (03/29/18 0400)     PRN Meds:    acetaminophen 650 mg Q4H PRN   acetaminophen 650 mg Q4H PRN   bisacodyl 10 mg Daily PRN   furosemide 40 mg Q6H PRN   HYDROmorphone 0 5 mg Q1H PRN   HYDROmorphone 1 mg Q1H PRN   lactated ringers 500 mL Q30 Min PRN   lidocaine (cardiac) 100 mg Q30 Min PRN   ondansetron 4 mg Q6H PRN   oxyCODONE-acetaminophen 1 tablet Q4H PRN   oxyCODONE-acetaminophen 2 tablet Q6H PRN   potassium chloride 20 mEq Once PRN   potassium chloride 20 mEq Q1H PRN   potassium chloride 20 mEq Q30 Min PRN       Home Medications:   Prior to Admission medications    Medication Sig Start Date End Date Taking? Authorizing Provider   atorvastatin (LIPITOR) 40 mg tablet Take 1 tablet (40 mg total) by mouth daily 18   Ana Reynolds DO   insulin aspart protamine-insulin aspart (NOVOLOG MIX 70/30 FLEXPEN) 100 Units/mL SUPN Inject under the skin Twice daily 2/11/15   Historical Provider, MD   LANTUS SOLOSTAR injection pen 100 units/mL Inject 0 8 mL (80 Units total) under the skin 2 (two) times a day 18   Ana Reynolds DO   LORazepam (ATIVAN) 0 5 mg tablet Take by mouth every 8 (eight) hours as needed for anxiety    Historical Provider, MD   metoclopramide (REGLAN) 5 mg tablet Take 5 mg by mouth daily      Historical Provider, MD   midodrine (PROAMATINE) 5 mg tablet Take 1 tablet (5 mg total) by mouth 3 (three) times a day 18   Ana Reynolds DO   torsemide [facility]) 10 mg tablet Take 1 tablet (10 mg total) by mouth daily 18   Ana Reynolds DO       Vitals:   Vitals:    18 2300 18 0000 18 0200 18 0400   BP:       BP Location:       Pulse: 70 68 66 68   Resp: 12  17   Temp: 99 °F (37 2 °C) 99 °F (37 2 °C) 99 °F (37 2 °C) 99 °F (37 2 °C)   TempSrc: Probe Probe Probe Probe   SpO2: 99% 98% 98% 97%   Weight:       Height:         Arterial Line BP: 100/48  Arterial Line MAP (mmHg): 64 mmHg    Tele Rhythm: NSR This was personally reviewed by myself  Respiratory:  SpO2: SpO2: 97 %  O2 Flow Rate (L/min): 1 L/min    Temperature: Temp (24hrs), Av 9 °F (37 2 °C), Min:97 1 °F (36 2 °C), Max:99 7 °F (37 6 °C)  Current: Temperature: 99 °F (37 2 °C)    Weights:   Weight (last 2 days)     Date/Time   Weight    18 0552  117 (258 82)    18 1500  118 (259 04)    18  --    Weight: pt  does not wish to stand  wants to be weighed later at 18            IBW: 79 9 kg  Body mass index is 34 15 kg/m²      Hemodynamic Monitoring:  PAP: PAP: 40/16    CVP: CVP (mean): 12 mmHg  CO: CO (L/min): 5 6 L/min   CI: CI (L/min/m2): 2 3 L/min/m2   SVR: SVR (dyne*sec)/cm5: 885 (dyne*sec)/cm5     Intake and Outputs:  Intake/Output Summary (Last 24 hours) at 03/29/18 0709  Last data filed at 03/29/18 0400   Gross per 24 hour   Intake          5014 17 ml   Output             2915 ml   Net          2099 17 ml     I/O last 24 hours: In: 5014 2 [I V :2649  2; NG/GT:40; IV Piggyback:1650]  Out: 5432 [Urine:1870; Blood:675; Chest Tube:370]    UOP: 30-100cc/hour     Chest tube Output:  Mediastinal tubes: 250 mL/8 hours  500 mL/24 hours   Pleural tubes: 140 mL/8 hours  140 mL/24 hours     Labs:    Results from last 7 days  Lab Units 03/29/18 0402 03/28/18 2125 03/28/18  1422 03/28/18  1343  03/28/18  1218  03/27/18  0524 03/26/18  0431 03/25/18  0544   WBC Thousand/uL 11 02*  --   --   --   --   --   --  10 96* 9 87 8 71   HEMOGLOBIN g/dL 9 6* 10 0*  --  10 3*  --   --   --  11 1* 11 8* 11 9*   I STAT HEMOGLOBIN g/dl  --   --  10 2*  --   < >  --   < >  --   --   --    HEMATOCRIT % 30 6* 30 5*  --  32 8*  --   --   --  34 6* 36 0* 36 4*   PLATELETS Thousands/uL 194  --   --  214  --  273  --  273 286 291   NEUTROS PCT %  --   --   --   --   --   --   --  67 69 67   MONOS PCT %  --   --   --   --   --   --   --  7 7 7   < > = values in this interval not displayed  Results from last 7 days  Lab Units 03/29/18 0402 03/28/18 2125 03/28/18  1737 03/28/18  1422 03/28/18  1343  03/27/18  0524   SODIUM mmol/L 139  --   --   --  138  --  133*   POTASSIUM mmol/L 5 0 5 2 5 2  --  3 4*  --  3 9   CHLORIDE mmol/L 106  --   --   --  104  --  98*   CO2 mmol/L 27  --   --   --  24  --  27   BUN mg/dL 12  --   --   --  13  --  17   CREATININE mg/dL 0 69  --   --   --  0 85  --  0 99   CALCIUM mg/dL 7 6*  --   --   --  8 1*  --  8 4   GLUCOSE RANDOM mg/dL 122  --   --   --  168*  --  143*   GLUCOSE, ISTAT mg/dl  --   --   --  165*  --   < >  --    < > = values in this interval not displayed      Results from last 7 days  Lab Units 03/29/18  0402 03/27/18  0524 03/26/18  0431   MAGNESIUM mg/dL 2 3 1 9 2 0       Results from last 7 days  Lab Units 03/25/18  0544   INR  1 06   PTT seconds 29     CXR: Line in apppropirate position no ptx visualized This was personally reviewed by myself in PACS  EKG: NSR, T wave inversion in V4-V6 T wave flattening in II and aVF This was personally reviewed by myself  Physical Exam:     Neck:  Supple  Cardiac: Regular rate and  rhythm   + rubs  no murmur  Pulmonary:  Breath sounds diminished with poor inspiratory effort  Abdomen:  Soft  Distension  Nontender to palpation  Incisions: Sternum is stable  Incision dressed with Acticoat  No erythema or drainage  Lower extremities: 1+ edema B/L  Neuro: Alert  Follows commands  Screening neurologic exam reveals no obvious deficits  Skin: Warm and dry  Invasive lines and devices:   Invasive Devices     Central Venous Catheter Line            CVC Central Lines 03/28/18 Triple less than 1 day          Peripheral Intravenous Line            Peripheral IV 03/25/18 Left Hand 3 days    Peripheral IV 03/28/18 Right Antecubital less than 1 day          Arterial Line            Arterial Line 03/28/18 Right Radial less than 1 day          Line            Pacer Wires less than 1 day    Pacer Wires less than 1 day          Drain            Chest Tube 1 Anterior Mediastinal 32 Fr  less than 1 day    Chest Tube 2 Left Pleural 32 Fr  less than 1 day    Chest Tube 3 Posterior Mediastinal 32 Fr  less than 1 day    Urethral Catheter Temperature probe 16 Fr  less than 1 day                Assessment:  Principal Problem:    Triple vessel coronary artery disease  Active Problems:    Anxiety and depression    Diabetic neuropathy, type II diabetes mellitus ([facility] [address] )    Hyperlipidemia    Uncontrolled diabetes mellitus type 2 with atherosclerosis of arteries of extremities (HCC)    PAD (peripheral artery disease) ([facility])    Non-healing wound of left heel    Orthostatic hypotension    Diabetic gastroparesis (HCC)    Bilateral lower extremity edema    Class 2 obesity due to excess calories with serious comorbidity and body mass index (BMI) of 35 0 to 35 9 in adult    Acute on chronic diastolic heart failure (HCC)    S/P CABG x 3    Acute respiratory insufficiency, postoperative  Resolved Problems:    * No resolved hospital problems  *      Plan:    Cardiac:   Cardiac infusions: Neosynephrine, 60 mcg/min  Hemodynamic goals:   CI > 2 2 and MAP >65   D/C [place] catheter   D/C arterial line  NSR; Hypotensive  Hold Bblocker  Continue ASA, PO amiodarone, and statin therapy  Maintain epicardial pacing wires for pacing needs  Continue DVT prophylaxis  Consult cardiology for postoperative medical management    Pulmonary:   Extubated  Chest tube output remains persistently high; Continue chest tubes to suction today  Acute post-op hypoxic respiratory insufficiency:  Continue incentive spirometry/coughing/deep breathing exercises  Wean supplemental oxygen as tolerated  Renal:   Discontinue IV potassium replacement scales  Post-op volume overload: Add Lasix 40 mg IV q D  Add K-dur 20 mEq q D  D/C jacobs catheter     Neuro:  Neurologically intact with no active issues  Incisional pain well-controlled  Discontinue IV narcotic therapy and continue with PO pain regimen    GI:  Initiate TLC 2 3 gm sodium diet with consistent carbohydrate modifier and 1800 mL fluid restriction  Continue stool softeners and prn suppository  Continue GI prophylaxis with PPI          Endo: History of diabetes: Continue insulin infusion today  Consult Endocrinology for management    Hematology:   Post-operative acute blood loss anemia:   Trend hemoglobin and platelets  Transfuse as needed  Disposition:   Transfer to SD vs floor if able to wean from ann marie    Collaborative bedside rounds performed with cardiac surgery attending and bedside RN      SIGNATURE: Yamilex Young NUNO  DATE: March 29, 2018  TIME: 7:09 AM <--- Click to Launch ICDx for PreOp, PostOp and Procedure

## 2021-01-15 NOTE — ASU PATIENT PROFILE, ADULT - PMH
Breast CA  Left  Cancer  BREAST   MASTECTOMY RIGHT  CHEMO RADIATION  HTN (hypertension)    Hypercholesteremia    Murmur    Pre-diabetes    TIA (transient ischemic attack)  2016

## 2021-01-15 NOTE — ASU DISCHARGE PLAN (ADULT/PEDIATRIC) - CARE PROVIDER_API CALL
[facility]  SURGERY  [address] [place] [id number]  Phone: [phone]  Fax: [phone]  Follow Up Time: 2 weeks

## 2021-01-26 LAB — SURGICAL PATHOLOGY STUDY: SIGNIFICANT CHANGE UP

## 2021-01-27 DIAGNOSIS — Z86.73 PERSONAL HISTORY OF TRANSIENT ISCHEMIC ATTACK (TIA), AND CEREBRAL INFARCTION WITHOUT RESIDUAL DEFICITS: ICD-10-CM

## 2021-01-27 DIAGNOSIS — Z79.82 LONG TERM (CURRENT) USE OF ASPIRIN: ICD-10-CM

## 2021-01-27 DIAGNOSIS — C50.912 MALIGNANT NEOPLASM OF UNSPECIFIED SITE OF LEFT FEMALE BREAST: ICD-10-CM

## 2021-01-27 DIAGNOSIS — E78.00 PURE HYPERCHOLESTEROLEMIA, UNSPECIFIED: ICD-10-CM

## 2021-01-27 DIAGNOSIS — I10 ESSENTIAL (PRIMARY) HYPERTENSION: ICD-10-CM

## 2021-01-27 DIAGNOSIS — Z88.0 ALLERGY STATUS TO PENICILLIN: ICD-10-CM

## 2021-02-18 ENCOUNTER — APPOINTMENT (OUTPATIENT)
Dept: HEMATOLOGY ONCOLOGY | Facility: CLINIC | Age: 76
End: 2021-02-18
Payer: MEDICARE

## 2021-02-18 ENCOUNTER — OUTPATIENT (OUTPATIENT)
Dept: OUTPATIENT SERVICES | Facility: HOSPITAL | Age: 76
LOS: 1 days | Discharge: HOME | End: 2021-02-18

## 2021-02-18 ENCOUNTER — LABORATORY RESULT (OUTPATIENT)
Age: 76
End: 2021-02-18

## 2021-02-18 VITALS
HEART RATE: 78 BPM | DIASTOLIC BLOOD PRESSURE: 76 MMHG | HEIGHT: 64 IN | RESPIRATION RATE: 14 BRPM | TEMPERATURE: 97.6 F | WEIGHT: 178 LBS | SYSTOLIC BLOOD PRESSURE: 166 MMHG | BODY MASS INDEX: 30.39 KG/M2

## 2021-02-18 DIAGNOSIS — C50.919 MALIGNANT NEOPLASM OF UNSPECIFIED SITE OF UNSPECIFIED FEMALE BREAST: ICD-10-CM

## 2021-02-18 DIAGNOSIS — Z98.890 OTHER SPECIFIED POSTPROCEDURAL STATES: Chronic | ICD-10-CM

## 2021-02-18 DIAGNOSIS — Z90.11 ACQUIRED ABSENCE OF RIGHT BREAST AND NIPPLE: Chronic | ICD-10-CM

## 2021-02-18 LAB
ALBUMIN SERPL ELPH-MCNC: 4.3 G/DL
ALP BLD-CCNC: 68 U/L
ALT SERPL-CCNC: 13 U/L
ANION GAP SERPL CALC-SCNC: 11 MMOL/L
AST SERPL-CCNC: 14 U/L
BILIRUB SERPL-MCNC: 0.4 MG/DL
BUN SERPL-MCNC: 15 MG/DL
CALCIUM SERPL-MCNC: 9.2 MG/DL
CHLORIDE SERPL-SCNC: 101 MMOL/L
CO2 SERPL-SCNC: 24 MMOL/L
CREAT SERPL-MCNC: 0.8 MG/DL
GLUCOSE SERPL-MCNC: 105 MG/DL
HCT VFR BLD CALC: 42 %
HGB BLD-MCNC: 14.1 G/DL
MCHC RBC-ENTMCNC: 29.5 PG
MCHC RBC-ENTMCNC: 33.6 G/DL
MCV RBC AUTO: 87.9 FL
PLATELET # BLD AUTO: 212 K/UL
PMV BLD: 11.4 FL
POTASSIUM SERPL-SCNC: 4.3 MMOL/L
PROT SERPL-MCNC: 7 G/DL
RBC # BLD: 4.78 M/UL
RBC # FLD: 13 %
SODIUM SERPL-SCNC: 136 MMOL/L
WBC # FLD AUTO: 8.32 K/UL

## 2021-02-18 PROCEDURE — 99214 OFFICE O/P EST MOD 30 MIN: CPT

## 2021-02-18 RX ORDER — METOPROLOL TARTRATE 25 MG/1
25 TABLET, FILM COATED ORAL
Refills: 0 | Status: ACTIVE | COMMUNITY

## 2021-03-02 ENCOUNTER — OUTPATIENT (OUTPATIENT)
Dept: OUTPATIENT SERVICES | Facility: HOSPITAL | Age: 76
LOS: 1 days | Discharge: HOME | End: 2021-03-02
Payer: MEDICARE

## 2021-03-02 ENCOUNTER — RESULT REVIEW (OUTPATIENT)
Age: 76
End: 2021-03-02

## 2021-03-02 DIAGNOSIS — Z98.890 OTHER SPECIFIED POSTPROCEDURAL STATES: Chronic | ICD-10-CM

## 2021-03-02 DIAGNOSIS — C50.919 MALIGNANT NEOPLASM OF UNSPECIFIED SITE OF UNSPECIFIED FEMALE BREAST: ICD-10-CM

## 2021-03-02 DIAGNOSIS — Z90.11 ACQUIRED ABSENCE OF RIGHT BREAST AND NIPPLE: Chronic | ICD-10-CM

## 2021-03-02 LAB — GLUCOSE BLDC GLUCOMTR-MCNC: 103 MG/DL — HIGH (ref 70–99)

## 2021-03-02 PROCEDURE — 78815 PET IMAGE W/CT SKULL-THIGH: CPT | Mod: 26,PI

## 2021-03-10 ENCOUNTER — OUTPATIENT (OUTPATIENT)
Dept: OUTPATIENT SERVICES | Facility: HOSPITAL | Age: 76
LOS: 1 days | Discharge: HOME | End: 2021-03-10
Payer: MEDICARE

## 2021-03-10 VITALS
TEMPERATURE: 97 F | RESPIRATION RATE: 18 BRPM | HEART RATE: 77 BPM | HEIGHT: 64 IN | OXYGEN SATURATION: 99 % | WEIGHT: 169.98 LBS | SYSTOLIC BLOOD PRESSURE: 138 MMHG | DIASTOLIC BLOOD PRESSURE: 80 MMHG

## 2021-03-10 DIAGNOSIS — C50.919 MALIGNANT NEOPLASM OF UNSPECIFIED SITE OF UNSPECIFIED FEMALE BREAST: ICD-10-CM

## 2021-03-10 DIAGNOSIS — Z90.12 ACQUIRED ABSENCE OF LEFT BREAST AND NIPPLE: Chronic | ICD-10-CM

## 2021-03-10 DIAGNOSIS — Z01.818 ENCOUNTER FOR OTHER PREPROCEDURAL EXAMINATION: ICD-10-CM

## 2021-03-10 DIAGNOSIS — Z98.890 OTHER SPECIFIED POSTPROCEDURAL STATES: Chronic | ICD-10-CM

## 2021-03-10 DIAGNOSIS — Z90.11 ACQUIRED ABSENCE OF RIGHT BREAST AND NIPPLE: Chronic | ICD-10-CM

## 2021-03-10 LAB
ALBUMIN SERPL ELPH-MCNC: 4.3 G/DL — SIGNIFICANT CHANGE UP (ref 3.5–5.2)
ALP SERPL-CCNC: 63 U/L — SIGNIFICANT CHANGE UP (ref 30–115)
ALT FLD-CCNC: 11 U/L — SIGNIFICANT CHANGE UP (ref 0–41)
ANION GAP SERPL CALC-SCNC: 10 MMOL/L — SIGNIFICANT CHANGE UP (ref 7–14)
APTT BLD: 29.9 SEC — SIGNIFICANT CHANGE UP (ref 27–39.2)
AST SERPL-CCNC: 12 U/L — SIGNIFICANT CHANGE UP (ref 0–41)
BASOPHILS # BLD AUTO: 0.03 K/UL — SIGNIFICANT CHANGE UP (ref 0–0.2)
BASOPHILS NFR BLD AUTO: 0.4 % — SIGNIFICANT CHANGE UP (ref 0–1)
BILIRUB SERPL-MCNC: 0.2 MG/DL — SIGNIFICANT CHANGE UP (ref 0.2–1.2)
BUN SERPL-MCNC: 20 MG/DL — SIGNIFICANT CHANGE UP (ref 10–20)
CALCIUM SERPL-MCNC: 9.1 MG/DL — SIGNIFICANT CHANGE UP (ref 8.5–10.1)
CHLORIDE SERPL-SCNC: 102 MMOL/L — SIGNIFICANT CHANGE UP (ref 98–110)
CO2 SERPL-SCNC: 25 MMOL/L — SIGNIFICANT CHANGE UP (ref 17–32)
CREAT SERPL-MCNC: 0.8 MG/DL — SIGNIFICANT CHANGE UP (ref 0.7–1.5)
EOSINOPHIL # BLD AUTO: 0.11 K/UL — SIGNIFICANT CHANGE UP (ref 0–0.7)
EOSINOPHIL NFR BLD AUTO: 1.5 % — SIGNIFICANT CHANGE UP (ref 0–8)
GLUCOSE SERPL-MCNC: 82 MG/DL — SIGNIFICANT CHANGE UP (ref 70–99)
HCT VFR BLD CALC: 40.9 % — SIGNIFICANT CHANGE UP (ref 37–47)
HGB BLD-MCNC: 13.6 G/DL — SIGNIFICANT CHANGE UP (ref 12–16)
IMM GRANULOCYTES NFR BLD AUTO: 0.1 % — SIGNIFICANT CHANGE UP (ref 0.1–0.3)
INR BLD: 1.03 RATIO — SIGNIFICANT CHANGE UP (ref 0.65–1.3)
LYMPHOCYTES # BLD AUTO: 2.75 K/UL — SIGNIFICANT CHANGE UP (ref 1.2–3.4)
LYMPHOCYTES # BLD AUTO: 37.6 % — SIGNIFICANT CHANGE UP (ref 20.5–51.1)
MCHC RBC-ENTMCNC: 29.7 PG — SIGNIFICANT CHANGE UP (ref 27–31)
MCHC RBC-ENTMCNC: 33.3 G/DL — SIGNIFICANT CHANGE UP (ref 32–37)
MCV RBC AUTO: 89.3 FL — SIGNIFICANT CHANGE UP (ref 81–99)
MONOCYTES # BLD AUTO: 0.73 K/UL — HIGH (ref 0.1–0.6)
MONOCYTES NFR BLD AUTO: 10 % — HIGH (ref 1.7–9.3)
NEUTROPHILS # BLD AUTO: 3.68 K/UL — SIGNIFICANT CHANGE UP (ref 1.4–6.5)
NEUTROPHILS NFR BLD AUTO: 50.4 % — SIGNIFICANT CHANGE UP (ref 42.2–75.2)
NRBC # BLD: 0 /100 WBCS — SIGNIFICANT CHANGE UP (ref 0–0)
PLATELET # BLD AUTO: 215 K/UL — SIGNIFICANT CHANGE UP (ref 130–400)
POTASSIUM SERPL-MCNC: 4.2 MMOL/L — SIGNIFICANT CHANGE UP (ref 3.5–5)
POTASSIUM SERPL-SCNC: 4.2 MMOL/L — SIGNIFICANT CHANGE UP (ref 3.5–5)
PROT SERPL-MCNC: 7 G/DL — SIGNIFICANT CHANGE UP (ref 6–8)
PROTHROM AB SERPL-ACNC: 11.8 SEC — SIGNIFICANT CHANGE UP (ref 9.95–12.87)
RBC # BLD: 4.58 M/UL — SIGNIFICANT CHANGE UP (ref 4.2–5.4)
RBC # FLD: 12.7 % — SIGNIFICANT CHANGE UP (ref 11.5–14.5)
SODIUM SERPL-SCNC: 137 MMOL/L — SIGNIFICANT CHANGE UP (ref 135–146)
WBC # BLD: 7.31 K/UL — SIGNIFICANT CHANGE UP (ref 4.8–10.8)
WBC # FLD AUTO: 7.31 K/UL — SIGNIFICANT CHANGE UP (ref 4.8–10.8)

## 2021-03-10 PROCEDURE — 93010 ELECTROCARDIOGRAM REPORT: CPT

## 2021-03-10 NOTE — H&P PST ADULT - HISTORY OF PRESENT ILLNESS
75 y/o female presents to PAST for chest lesion biopsy for Dr. Ramos to be done in IR by Dr. Giraldo on 3/18/21    Pt states was dx with breast cancer in 11/20. Pt had biopsy completed that confirmed dx. Pt had mastectomy of left breast in 1/21. Pt had pet scan 1 week ago that showed abnormality on left side lower lobe of lung. Pt now presents for presurgical testing for chest biopsy in IR for 3/18/21.     PATIENT CURRENTLY DENIES CHEST PAIN  SHORTNESS OF BREATH  PALPITATIONS,  DYSURIA, OR UPPER RESPIRATORY INFECTION IN PAST 2 WEEKS  EXERCISE  TOLERANCE  1 FLIGHT OF STAIRS  WITHOUT SHORTNESS OF BREATH    denies travel outside the [place] in the past 30 days    pt denies any covid s/s, or tested positive in the past  pt advised self quarantine till day of procedure    Anesthesia Alert  NO--Difficult Airway  Yes--History of neck surgery or radiation, lipoma on neck removed 1 yr ago  NO--Limited ROM of neck  NO--History of Malignant hyperthermia  NO--No personal or family history of Pseudocholinesterase deficiency.  NO--Prior Anesthesia Complication  NO--Latex Allergy  NO--Loose teeth  NO--History of Rheumatoid Arthritis  NO--JOHN  NO--Other_____    Encounter for other preprocedural examination    Malignant neoplasm of female breast    Family history of rectal cancer (Father)    Family history of breast cancer (Mother)    No pertinent family history in first degree relatives    Encounter for other preprocedural examination    Malignant neoplasm of female breast    Pre-diabetes    Breast CA    Cancer    Murmur    TIA (transient ischemic attack)    Hypercholesteremia    HTN (hypertension)    S/P excision of lipoma    H/O right mastectomy     75 y/o female presents to PAST for chest lesion biopsy for Dr. Ramos to be done in IR by Dr. Cardenas on 3/18/21    Pt states was dx with breast cancer in 11/20. Pt had biopsy completed that confirmed dx. Pt had mastectomy of left breast in 1/21. Pt had pet scan 1 week ago that showed abnormality on left side lower lobe of lung. Pt now presents for presurgical testing for chest biopsy in IR for 3/18/21.     PATIENT CURRENTLY DENIES CHEST PAIN  SHORTNESS OF BREATH  PALPITATIONS,  DYSURIA, OR UPPER RESPIRATORY INFECTION IN PAST 2 WEEKS  EXERCISE  TOLERANCE  1 FLIGHT OF STAIRS  WITHOUT SHORTNESS OF BREATH    denies travel outside the [place] in the past 30 days    pt denies any covid s/s, or tested positive in the past  pt advised self quarantine till day of procedure    Anesthesia Alert  NO--Difficult Airway  Yes--History of neck surgery or radiation, lipoma on neck removed 1 yr ago  NO--Limited ROM of neck  NO--History of Malignant hyperthermia  NO--No personal or family history of Pseudocholinesterase deficiency.  NO--Prior Anesthesia Complication  NO--Latex Allergy  NO--Loose teeth  NO--History of Rheumatoid Arthritis  NO--JOHN  NO--Other_____    Encounter for other preprocedural examination    Malignant neoplasm of female breast    Family history of rectal cancer (Father)    Family history of breast cancer (Mother)    No pertinent family history in first degree relatives    Encounter for other preprocedural examination    Malignant neoplasm of female breast    Pre-diabetes    Breast CA    Cancer    Murmur    TIA (transient ischemic attack)    Hypercholesteremia    HTN (hypertension)    S/P excision of lipoma    H/O right mastectomy

## 2021-03-10 NOTE — H&P PST ADULT - NSICDXPASTMEDICALHX_GEN_ALL_CORE_FT
PAST MEDICAL HISTORY:  Breast CA Left    Cancer BREAST   MASTECTOMY RIGHT  CHEMO RADIATION    HTN (hypertension)     Hypercholesteremia     Murmur     Pre-diabetes     TIA (transient ischemic attack) 2016

## 2021-03-10 NOTE — H&P PST ADULT - NSICDXPASTSURGICALHX_GEN_ALL_CORE_FT
PAST SURGICAL HISTORY:  H/O left mastectomy 2021    H/O right mastectomy 1989    S/P excision of lipoma

## 2021-03-10 NOTE — H&P PST ADULT - REASON FOR ADMISSION
77 y/o female presents to PAST for chest lesion biopsy for Dr. Ramos to be done in IR by Dr. Giraldo on 3/18/21 75 y/o female presents to PAST for chest lesion biopsy for Dr. Ramos to be done in IR by Dr. Cardenas on 3/18/21

## 2021-03-10 NOTE — H&P PST ADULT - BLOOD AVOIDANCE/RESTRICTIONS, PROFILE
"RN spoke with pt to update pt about hydration appt today arranged by MD. Pt reports that he is not able to make appt today due to diarrhea. He was instructed by radiation provider to start imodium, her reports taking first dose \"just a couple minutes ago\". Pt declines dizziness upon standing and reports that if symptoms persist to update Dr. Hartmann and radiation providers, her verbalized understanding. Pt is aware of hydration/neupogen appt on Thursday, Fri and SAt and confirmed appt with pt  " none

## 2021-03-15 ENCOUNTER — LABORATORY RESULT (OUTPATIENT)
Age: 76
End: 2021-03-15

## 2021-03-15 ENCOUNTER — OUTPATIENT (OUTPATIENT)
Dept: OUTPATIENT SERVICES | Facility: HOSPITAL | Age: 76
LOS: 1 days | Discharge: HOME | End: 2021-03-15

## 2021-03-15 DIAGNOSIS — Z11.59 ENCOUNTER FOR SCREENING FOR OTHER VIRAL DISEASES: ICD-10-CM

## 2021-03-15 DIAGNOSIS — Z90.11 ACQUIRED ABSENCE OF RIGHT BREAST AND NIPPLE: Chronic | ICD-10-CM

## 2021-03-15 DIAGNOSIS — Z98.890 OTHER SPECIFIED POSTPROCEDURAL STATES: Chronic | ICD-10-CM

## 2021-03-15 DIAGNOSIS — Z90.12 ACQUIRED ABSENCE OF LEFT BREAST AND NIPPLE: Chronic | ICD-10-CM

## 2021-03-15 NOTE — REVIEW OF SYSTEMS
[Joint Stiffness] : joint stiffness [Negative] : Heme/Lymph [Joint Pain] : no joint pain [FreeTextEntry9] : E

## 2021-03-15 NOTE — PHYSICAL EXAM
[Fully active, able to carry on all pre-disease performance without restriction] : Status 0 - Fully active, able to carry on all pre-disease performance without restriction [Normal] : affect appropriate [de-identified] : But somewhat overweight [de-identified] : S/P bilateral mastectomies, no evidence of local recurrence. Still some residual redness at the inferior aspect of the left mastectomy site, otherwise the scar looks well-healed.

## 2021-03-15 NOTE — HISTORY OF PRESENT ILLNESS
[Disease: _____________________] : Disease: [unfilled] [T: ___] : T[unfilled] [N: ___] : N[unfilled] [M: ___] : M[unfilled] [AJCC Stage: ____] : AJCC Stage: [unfilled] [de-identified] : The patient is coming for her regularly scheduled follow up for her recently diagnosed left breast carcinoma. She had opted for a mastectomy.\par Eventually, she was found to have an 8 mm invasive, poorly differentiated ductal carcinoma. All 4 lymph nodes dissected were negative for tumor spread. The tumor was triple negative.\par The surgery was performed on January 15. She was discharged the same day. The site is a little sore and red. She was seen by the surgeon and was recommended to clean the site with peroxide, Betadine and use bacitracin. \par The patient is denying any other pain. The appetite is good. No cough or shortness of breath. No problems with urine or bowel movements.\par She had a colonoscopy back in September 2020 and was found to have 4 colonic polyps. \par Last Pap smear was 2 years ago and it was within acceptable limits.\par The patient was recently started on metoprolol for high blood pressure. She also take simvastatin and enalapril.\par No new TIA-like events.\par \par No new events in the family cancer wise. [de-identified] : Poorly differentiated ductal

## 2021-03-18 ENCOUNTER — RESULT REVIEW (OUTPATIENT)
Age: 76
End: 2021-03-18

## 2021-03-18 ENCOUNTER — OUTPATIENT (OUTPATIENT)
Dept: OUTPATIENT SERVICES | Facility: HOSPITAL | Age: 76
LOS: 1 days | Discharge: HOME | End: 2021-03-18
Payer: MEDICARE

## 2021-03-18 DIAGNOSIS — Z90.12 ACQUIRED ABSENCE OF LEFT BREAST AND NIPPLE: Chronic | ICD-10-CM

## 2021-03-18 DIAGNOSIS — Z90.11 ACQUIRED ABSENCE OF RIGHT BREAST AND NIPPLE: Chronic | ICD-10-CM

## 2021-03-18 DIAGNOSIS — Z98.890 OTHER SPECIFIED POSTPROCEDURAL STATES: Chronic | ICD-10-CM

## 2021-03-18 PROCEDURE — 88360 TUMOR IMMUNOHISTOCHEM/MANUAL: CPT | Mod: 26

## 2021-03-18 PROCEDURE — 99152 MOD SED SAME PHYS/QHP 5/>YRS: CPT

## 2021-03-18 PROCEDURE — 71045 X-RAY EXAM CHEST 1 VIEW: CPT | Mod: 26

## 2021-03-18 PROCEDURE — 88341 IMHCHEM/IMCYTCHM EA ADD ANTB: CPT | Mod: 26,59

## 2021-03-18 PROCEDURE — 77012 CT SCAN FOR NEEDLE BIOPSY: CPT | Mod: 26

## 2021-03-18 PROCEDURE — 88333 PATH CONSLTJ SURG CYTO XM 1: CPT | Mod: 26

## 2021-03-18 PROCEDURE — 88305 TISSUE EXAM BY PATHOLOGIST: CPT | Mod: 26

## 2021-03-18 PROCEDURE — 88342 IMHCHEM/IMCYTCHM 1ST ANTB: CPT | Mod: 26,59

## 2021-03-18 PROCEDURE — 32408 CORE NDL BX LNG/MED PERQ: CPT | Mod: RT

## 2021-03-18 PROCEDURE — 88344 IMHCHEM/IMCYTCHM EA MLT ANTB: CPT | Mod: 26,59

## 2021-03-18 NOTE — PROGRESS NOTE ADULT - NSTELEHEALTH_GEN_ALL_CORE
No
No
I have personally seen and examined this patient.  I have fully participated in the care of this patient. I have reviewed all pertinent clinical information, including history, physical exam, plan and the Resident’s note and agree except as noted.

## 2021-03-18 NOTE — PROGRESS NOTE ADULT - SUBJECTIVE AND OBJECTIVE BOX
INTERVENTIONAL RADIOLOGY BRIEF-OPERATIVE NOTE    Procedure:  Percutaneous, CT-directed needle biopsy of lower lobe right lung nodule    Pre-Op Diagnosis:  H/O B/L Breast cancers;  RLL lung nodule    Post-Op Diagnosis:  Same    Attending:   Dr. Hameed  Resident:   None    Anesthesia (type):  [ ] General Anesthesia  [X] Sedation--  Versed, 3mg and Fentanyl, 100mcg, iv (in divided doses)  [ ] Spinal Anesthesia  [X] Local/Regional-- 1% Lidocaine, SQ, 10cc right chest wall    Total Face-to-Face Sedation Time:  90 minutes    Contrast:   None    Blood Loss:  15cc    Condition:   [ ] Critical  [ ] Serious  [ ] Fair   [X] Good    Findings/Follow up Plan of Care:  RLL lung nodule corresponding to recently imaged FDG-avid lesion along the right diaphragm was biopsied using a 19/20G, 9.4/15cm CorVocet coaxial needle system with CT-guidance.  Multiple 20G tissue cores obtained and submitted for light microscopic and pathologic evaluation.  Small pulmonary contusion encountered following first pass that grossly remained stable post-procedure.  Patient did have an episode of hemoptysis, about 3 tablespoons full; VSS otherwise.  Post-biopsy CT showed trace anterior right pneumothorax.  CXR planned prior to anticipated D/C home at 1pm.    Specimens Removed:  9 20G tissue cores    Implants:  None    Complications:  Small pulmonary contusion at biopsy needle site; trace pneumothorax    Disposition:  f/u CXR at 1pm prior to anticipated D/C home;  f/u Dr. Taylor      Please call Interventional Radiology [phone] with any questions, concerns, or issues.        
PRE-OPERATIVE DAY OF PROCEDURE EVALUATION:     I have personally seen and examined this patient. I agree with the history and physical which I have reviewed and noted any changes below:     Plan is for percutaneous, CT-directed needle biopsy of lower lobe, right lung nodule with intravenous conscious sedation and possible chest tube placement.    Procedure/ risks/ benefits/ goals/ alternatives were explained.  All questions answered. Informed content obtained from patient.  Consent placed in chart.

## 2021-03-23 LAB — NON-GYNECOLOGICAL CYTOLOGY STUDY: SIGNIFICANT CHANGE UP

## 2021-03-25 DIAGNOSIS — R91.1 SOLITARY PULMONARY NODULE: ICD-10-CM

## 2021-03-25 DIAGNOSIS — C50.919 MALIGNANT NEOPLASM OF UNSPECIFIED SITE OF UNSPECIFIED FEMALE BREAST: ICD-10-CM

## 2021-03-25 DIAGNOSIS — Z79.82 LONG TERM (CURRENT) USE OF ASPIRIN: ICD-10-CM

## 2021-03-25 DIAGNOSIS — E78.00 PURE HYPERCHOLESTEROLEMIA, UNSPECIFIED: ICD-10-CM

## 2021-03-25 DIAGNOSIS — I10 ESSENTIAL (PRIMARY) HYPERTENSION: ICD-10-CM

## 2021-04-12 ENCOUNTER — APPOINTMENT (OUTPATIENT)
Dept: HEMATOLOGY ONCOLOGY | Facility: CLINIC | Age: 76
End: 2021-04-12
Payer: MEDICARE

## 2021-04-12 VITALS
RESPIRATION RATE: 14 BRPM | BODY MASS INDEX: 30.39 KG/M2 | HEIGHT: 64 IN | TEMPERATURE: 97.6 F | WEIGHT: 178 LBS | SYSTOLIC BLOOD PRESSURE: 134 MMHG | HEART RATE: 80 BPM | DIASTOLIC BLOOD PRESSURE: 68 MMHG

## 2021-04-12 PROCEDURE — 99214 OFFICE O/P EST MOD 30 MIN: CPT

## 2021-04-13 NOTE — ASSESSMENT
[FreeTextEntry1] : 1. 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 The chest wall post mastectomy is still healing. There were also components of DCIS and LCIS. \par 2. Old history of right mastectomy about 30 years ago. Has been in remission since.\par \par The situation was discussed with the patient and her . They were told about the significance of triple negative tumors. \par She will need chemotherapy given the nature of her recent disease (triple negative) and Ki-67 of 80%.\par \par Review of old records show that she received Adriamycin (FAC regimen) more than 30 years ago.  \par We will need to get input from cardiologist regarding treating with Adriamycin again.\par She is seeing Dr. Stout on Wednesday (in 2 days).\par MUGA ordered.\par \par Further recommendations regarding chemotherapy, A/C followed by T, or TC alone given the previous treatment with Adriamycin, after she sees cardiology and MUGA completed.\par \par All questions answered. \par \par Case discussed and patient seen with Dr. Ramos who agreed with the above.\par

## 2021-04-13 NOTE — HISTORY OF PRESENT ILLNESS
[Disease: _____________________] : Disease: [unfilled] [T: ___] : T[unfilled] [N: ___] : N[unfilled] [M: ___] : M[unfilled] [AJCC Stage: ____] : AJCC Stage: [unfilled] [de-identified] : The patient is coming for her regularly scheduled follow up for her recently diagnosed left breast carcinoma. She had opted for a mastectomy.\par Eventually, she was found to have an 8 mm invasive, poorly differentiated ductal carcinoma. All 4 lymph nodes dissected were negative for tumor spread. The tumor was triple negative.\par The surgery was performed on January 15. She was discharged the same day. The site is a little sore and red. She was seen by the surgeon and was recommended to clean the site with peroxide, Betadine and use bacitracin. \par The patient is denying any other pain. The appetite is good. No cough or shortness of breath. No problems with urine or bowel movements.\par She had a colonoscopy back in September 2020 and was found to have 4 colonic polyps. \par Last Pap smear was 2 years ago and it was within acceptable limits.\par The patient was recently started on metoprolol for high blood pressure. She also take simvastatin and enalapril.\par No new TIA-like events.\par \par No new events in the family cancer wise. [de-identified] : Poorly differentiated ductal [de-identified] : 4/12/21\par Patient here for follow up visit for recently diagnosed TNBC.  \par She is status post R lung biopsy which was negative for malignancy.  She is feeling well, has no new complaints. She is here to discuss chemotherapy options.  \par Patient denies cough, shortness of breath, fever, chills, night sweats or bone pain.\par She is on no new medications.

## 2021-04-13 NOTE — REVIEW OF SYSTEMS
[Joint Stiffness] : joint stiffness [Negative] : Heme/Lymph [Joint Pain] : no joint pain [de-identified] : Redness and swelling at L mastectomy site, improved,  status post PO antibiotics, being followed by surgeon

## 2021-04-13 NOTE — PHYSICAL EXAM
[Fully active, able to carry on all pre-disease performance without restriction] : Status 0 - Fully active, able to carry on all pre-disease performance without restriction [Normal] : affect appropriate [de-identified] : But somewhat overweight [de-identified] : Occasional skipped beats [de-identified] : S/P bilateral mastectomies, no evidence of local recurrence. Still some residual redness at the inferior aspect of the left mastectomy site, otherwise the scar looks well-healed.

## 2021-05-07 ENCOUNTER — APPOINTMENT (OUTPATIENT)
Dept: HEMATOLOGY ONCOLOGY | Facility: CLINIC | Age: 76
End: 2021-05-07

## 2021-05-07 ENCOUNTER — LABORATORY RESULT (OUTPATIENT)
Age: 76
End: 2021-05-07

## 2021-05-07 LAB
HCT VFR BLD CALC: 41.1 %
HGB BLD-MCNC: 14.2 G/DL
MCHC RBC-ENTMCNC: 30 PG
MCHC RBC-ENTMCNC: 34.5 G/DL
MCV RBC AUTO: 86.9 FL
PLATELET # BLD AUTO: 189 K/UL
PMV BLD: 11.2 FL
RBC # BLD: 4.73 M/UL
RBC # FLD: 13.2 %
WBC # FLD AUTO: 7.04 K/UL

## 2021-05-10 LAB
ALBUMIN SERPL ELPH-MCNC: 4.2 G/DL
ALP BLD-CCNC: 72 U/L
ALT SERPL-CCNC: 11 U/L
ANION GAP SERPL CALC-SCNC: 15 MMOL/L
AST SERPL-CCNC: 14 U/L
BILIRUB SERPL-MCNC: 0.3 MG/DL
BUN SERPL-MCNC: 15 MG/DL
CALCIUM SERPL-MCNC: 9.4 MG/DL
CHLORIDE SERPL-SCNC: 102 MMOL/L
CO2 SERPL-SCNC: 21 MMOL/L
CREAT SERPL-MCNC: 0.8 MG/DL
GLUCOSE SERPL-MCNC: 93 MG/DL
POTASSIUM SERPL-SCNC: 4.6 MMOL/L
PROT SERPL-MCNC: 6.8 G/DL
SODIUM SERPL-SCNC: 138 MMOL/L

## 2021-05-11 ENCOUNTER — NON-APPOINTMENT (OUTPATIENT)
Age: 76
End: 2021-05-11

## 2021-05-11 ENCOUNTER — APPOINTMENT (OUTPATIENT)
Dept: HEMATOLOGY ONCOLOGY | Facility: CLINIC | Age: 76
End: 2021-05-11

## 2021-05-14 ENCOUNTER — LABORATORY RESULT (OUTPATIENT)
Age: 76
End: 2021-05-14

## 2021-05-14 ENCOUNTER — APPOINTMENT (OUTPATIENT)
Dept: INFUSION THERAPY | Facility: CLINIC | Age: 76
End: 2021-05-14

## 2021-05-14 LAB
ANION GAP SERPL CALC-SCNC: 15 MMOL/L
APTT BLD: 28.6 SEC
BUN SERPL-MCNC: 14 MG/DL
CALCIUM SERPL-MCNC: 9.5 MG/DL
CHLORIDE SERPL-SCNC: 101 MMOL/L
CO2 SERPL-SCNC: 23 MMOL/L
CREAT SERPL-MCNC: 0.7 MG/DL
GLUCOSE SERPL-MCNC: 96 MG/DL
HCT VFR BLD CALC: 40.4 %
HGB BLD-MCNC: 13.7 G/DL
INR PPP: 1.1 RATIO
MCHC RBC-ENTMCNC: 29.5 PG
MCHC RBC-ENTMCNC: 33.9 G/DL
MCV RBC AUTO: 86.9 FL
PLATELET # BLD AUTO: 170 K/UL
PMV BLD: 12.1 FL
POTASSIUM SERPL-SCNC: 4.3 MMOL/L
PT BLD: 12.7 SEC
RBC # BLD: 4.65 M/UL
RBC # FLD: 13.1 %
SODIUM SERPL-SCNC: 139 MMOL/L
WBC # FLD AUTO: 7.67 K/UL

## 2021-05-14 RX ORDER — ONDANSETRON 8 MG/1
16 TABLET, FILM COATED ORAL ONCE
Refills: 0 | Status: COMPLETED | OUTPATIENT
Start: 2021-05-14 | End: 2021-05-14

## 2021-05-14 RX ORDER — DEXAMETHASONE 0.5 MG/5ML
8 ELIXIR ORAL ONCE
Refills: 0 | Status: COMPLETED | OUTPATIENT
Start: 2021-05-14 | End: 2021-05-14

## 2021-05-14 RX ORDER — DEXRAZOXANE FOR INJECTION 500 MG/50ML
1000 INJECTION, POWDER, LYOPHILIZED, FOR SOLUTION INTRAVENOUS ONCE
Refills: 0 | Status: COMPLETED | OUTPATIENT
Start: 2021-05-14 | End: 2021-05-14

## 2021-05-14 RX ORDER — CYCLOPHOSPHAMIDE 100 MG
1000 VIAL (EA) INTRAVENOUS ONCE
Refills: 0 | Status: COMPLETED | OUTPATIENT
Start: 2021-05-14 | End: 2021-05-14

## 2021-05-14 RX ORDER — FOSAPREPITANT DIMEGLUMINE 150 MG/5ML
150 INJECTION, POWDER, LYOPHILIZED, FOR SOLUTION INTRAVENOUS ONCE
Refills: 0 | Status: COMPLETED | OUTPATIENT
Start: 2021-05-14 | End: 2021-05-14

## 2021-05-14 RX ORDER — DOXORUBICIN HYDROCHLORIDE 2 MG/ML
100 INJECTION, SOLUTION INTRAVENOUS ONCE
Refills: 0 | Status: COMPLETED | OUTPATIENT
Start: 2021-05-14 | End: 2021-05-14

## 2021-05-14 RX ADMIN — Medication 8 MILLIGRAM(S): at 12:37

## 2021-05-19 ENCOUNTER — OUTPATIENT (OUTPATIENT)
Dept: OUTPATIENT SERVICES | Facility: HOSPITAL | Age: 76
LOS: 1 days | Discharge: HOME | End: 2021-05-19

## 2021-05-19 VITALS
DIASTOLIC BLOOD PRESSURE: 76 MMHG | HEART RATE: 72 BPM | HEIGHT: 64 IN | TEMPERATURE: 97 F | OXYGEN SATURATION: 96 % | WEIGHT: 175.05 LBS | RESPIRATION RATE: 16 BRPM | SYSTOLIC BLOOD PRESSURE: 156 MMHG

## 2021-05-19 DIAGNOSIS — I10 ESSENTIAL (PRIMARY) HYPERTENSION: ICD-10-CM

## 2021-05-19 DIAGNOSIS — Z98.890 OTHER SPECIFIED POSTPROCEDURAL STATES: Chronic | ICD-10-CM

## 2021-05-19 DIAGNOSIS — Z01.818 ENCOUNTER FOR OTHER PREPROCEDURAL EXAMINATION: ICD-10-CM

## 2021-05-19 DIAGNOSIS — Z90.12 ACQUIRED ABSENCE OF LEFT BREAST AND NIPPLE: Chronic | ICD-10-CM

## 2021-05-19 DIAGNOSIS — E78.00 PURE HYPERCHOLESTEROLEMIA, UNSPECIFIED: ICD-10-CM

## 2021-05-19 DIAGNOSIS — Z86.73 PERSONAL HISTORY OF TRANSIENT ISCHEMIC ATTACK (TIA), AND CEREBRAL INFARCTION WITHOUT RESIDUAL DEFICITS: ICD-10-CM

## 2021-05-19 DIAGNOSIS — Z79.82 LONG TERM (CURRENT) USE OF ASPIRIN: ICD-10-CM

## 2021-05-19 DIAGNOSIS — Z90.11 ACQUIRED ABSENCE OF RIGHT BREAST AND NIPPLE: Chronic | ICD-10-CM

## 2021-05-19 DIAGNOSIS — C50.919 MALIGNANT NEOPLASM OF UNSPECIFIED SITE OF UNSPECIFIED FEMALE BREAST: ICD-10-CM

## 2021-05-19 NOTE — H&P PST ADULT - NSICDXPASTMEDICALHX_GEN_ALL_CORE_FT
PAST MEDICAL HISTORY:  Breast CA Left -1/2021    Cancer BREAST   MASTECTOMY RIGHT  CHEMO [id number]    HTN (hypertension)     Hypercholesteremia     Murmur     Pre-diabetes     TIA (transient ischemic attack) 2016

## 2021-05-19 NOTE — H&P PST ADULT - HISTORY OF PRESENT ILLNESS
76 yr old female states was diagnosed with breast cancer recently- with poor IV access-presents to pretesting today -is scheduled for Port placement. Denies COVID S/S or recent exposure. Verbalized understanding of COVID prevention measures. Received 2 doses of the COVID vaccine. Exercise chaz 1 FOS. S/p MASTECTOMY AND lung biopsy -2021-uneventful recovery.  Anesthesia Alert  YES--Difficult Airway  NO--History of neck surgery or radiation  YES--Limited ROM of neck  NO--History of Malignant hyperthermia  NO--Personal or family history of Pseudocholinesterase deficiency  NO--Prior Anesthesia Complication  NO--Latex Allergy  NO--Loose teeth  NO--History of Rheumatoid Arthritis  NO--JOHN  No Bleeding risk  NO--Other_____

## 2021-05-19 NOTE — H&P PST ADULT - NSICDXPASTSURGICALHX_GEN_ALL_CORE_FT
PAST SURGICAL HISTORY:  H/O left mastectomy 2021    H/O right mastectomy 1989    History of lung biopsy 3/2021    S/P excision of lipoma

## 2021-05-21 ENCOUNTER — OUTPATIENT (OUTPATIENT)
Dept: OUTPATIENT SERVICES | Facility: HOSPITAL | Age: 76
LOS: 1 days | Discharge: HOME | End: 2021-05-21

## 2021-05-21 ENCOUNTER — LABORATORY RESULT (OUTPATIENT)
Age: 76
End: 2021-05-21

## 2021-05-21 DIAGNOSIS — Z90.11 ACQUIRED ABSENCE OF RIGHT BREAST AND NIPPLE: Chronic | ICD-10-CM

## 2021-05-21 DIAGNOSIS — Z98.890 OTHER SPECIFIED POSTPROCEDURAL STATES: Chronic | ICD-10-CM

## 2021-05-21 DIAGNOSIS — Z90.12 ACQUIRED ABSENCE OF LEFT BREAST AND NIPPLE: Chronic | ICD-10-CM

## 2021-05-21 DIAGNOSIS — Z11.59 ENCOUNTER FOR SCREENING FOR OTHER VIRAL DISEASES: ICD-10-CM

## 2021-05-21 PROBLEM — C50.919 MALIGNANT NEOPLASM OF UNSPECIFIED SITE OF UNSPECIFIED FEMALE BREAST: Chronic | Status: ACTIVE | Noted: 2020-12-29

## 2021-05-21 PROBLEM — C80.1 MALIGNANT (PRIMARY) NEOPLASM, UNSPECIFIED: Chronic | Status: ACTIVE | Noted: 2018-11-23

## 2021-05-24 ENCOUNTER — RESULT REVIEW (OUTPATIENT)
Age: 76
End: 2021-05-24

## 2021-05-24 ENCOUNTER — OUTPATIENT (OUTPATIENT)
Dept: OUTPATIENT SERVICES | Facility: HOSPITAL | Age: 76
LOS: 1 days | Discharge: HOME | End: 2021-05-24
Payer: MEDICARE

## 2021-05-24 DIAGNOSIS — Z90.11 ACQUIRED ABSENCE OF RIGHT BREAST AND NIPPLE: Chronic | ICD-10-CM

## 2021-05-24 DIAGNOSIS — Z90.12 ACQUIRED ABSENCE OF LEFT BREAST AND NIPPLE: Chronic | ICD-10-CM

## 2021-05-24 DIAGNOSIS — Z98.890 OTHER SPECIFIED POSTPROCEDURAL STATES: Chronic | ICD-10-CM

## 2021-05-24 PROCEDURE — 76937 US GUIDE VASCULAR ACCESS: CPT | Mod: 26

## 2021-05-24 PROCEDURE — 77001 FLUOROGUIDE FOR VEIN DEVICE: CPT | Mod: 26

## 2021-05-24 PROCEDURE — 36561 INSERT TUNNELED CV CATH: CPT

## 2021-05-24 PROCEDURE — 99152 MOD SED SAME PHYS/QHP 5/>YRS: CPT

## 2021-05-25 ENCOUNTER — APPOINTMENT (OUTPATIENT)
Dept: HEMATOLOGY ONCOLOGY | Facility: CLINIC | Age: 76
End: 2021-05-25
Payer: MEDICARE

## 2021-05-25 ENCOUNTER — LABORATORY RESULT (OUTPATIENT)
Age: 76
End: 2021-05-25

## 2021-05-25 ENCOUNTER — APPOINTMENT (OUTPATIENT)
Dept: INFUSION THERAPY | Facility: CLINIC | Age: 76
End: 2021-05-25

## 2021-05-25 VITALS
HEART RATE: 72 BPM | DIASTOLIC BLOOD PRESSURE: 72 MMHG | WEIGHT: 177 LBS | BODY MASS INDEX: 30.22 KG/M2 | HEIGHT: 64 IN | TEMPERATURE: 97.7 F | SYSTOLIC BLOOD PRESSURE: 143 MMHG

## 2021-05-25 LAB
HCT VFR BLD CALC: 41.8 %
HGB BLD-MCNC: 14.3 G/DL
MCHC RBC-ENTMCNC: 29.9 PG
MCHC RBC-ENTMCNC: 34.2 G/DL
MCV RBC AUTO: 87.3 FL
PLATELET # BLD AUTO: 173 K/UL
PMV BLD: 11.3 FL
RBC # BLD: 4.79 M/UL
RBC # FLD: 13.2 %
WBC # FLD AUTO: 8.54 K/UL

## 2021-05-25 PROCEDURE — 99214 OFFICE O/P EST MOD 30 MIN: CPT

## 2021-05-25 RX ORDER — DEXRAZOXANE FOR INJECTION 500 MG/50ML
500 INJECTION, POWDER, LYOPHILIZED, FOR SOLUTION INTRAVENOUS ONCE
Refills: 0 | Status: COMPLETED | OUTPATIENT
Start: 2021-05-25 | End: 2021-05-25

## 2021-05-25 RX ORDER — FOSAPREPITANT DIMEGLUMINE 150 MG/5ML
150 INJECTION, POWDER, LYOPHILIZED, FOR SOLUTION INTRAVENOUS ONCE
Refills: 0 | Status: COMPLETED | OUTPATIENT
Start: 2021-05-25 | End: 2021-05-25

## 2021-05-25 RX ORDER — DOXORUBICIN HYDROCHLORIDE 2 MG/ML
100 INJECTION, SOLUTION INTRAVENOUS ONCE
Refills: 0 | Status: COMPLETED | OUTPATIENT
Start: 2021-05-25 | End: 2021-05-25

## 2021-05-25 RX ORDER — ONDANSETRON 8 MG/1
16 TABLET, FILM COATED ORAL ONCE
Refills: 0 | Status: COMPLETED | OUTPATIENT
Start: 2021-05-25 | End: 2021-05-25

## 2021-05-25 RX ORDER — DEXAMETHASONE 0.5 MG/5ML
8 ELIXIR ORAL ONCE
Refills: 0 | Status: COMPLETED | OUTPATIENT
Start: 2021-05-25 | End: 2021-05-25

## 2021-05-25 RX ORDER — CYCLOPHOSPHAMIDE 100 MG
1000 VIAL (EA) INTRAVENOUS ONCE
Refills: 0 | Status: COMPLETED | OUTPATIENT
Start: 2021-05-25 | End: 2021-05-25

## 2021-05-25 RX ADMIN — DEXRAZOXANE FOR INJECTION 500 MILLIGRAM(S): 500 INJECTION, POWDER, LYOPHILIZED, FOR SOLUTION INTRAVENOUS at 13:00

## 2021-05-25 RX ADMIN — FOSAPREPITANT DIMEGLUMINE 150 MILLIGRAM(S): 150 INJECTION, POWDER, LYOPHILIZED, FOR SOLUTION INTRAVENOUS at 11:20

## 2021-05-25 RX ADMIN — ONDANSETRON 116 MILLIGRAM(S): 8 TABLET, FILM COATED ORAL at 10:39

## 2021-05-25 RX ADMIN — DEXRAZOXANE FOR INJECTION 500 MILLIGRAM(S): 500 INJECTION, POWDER, LYOPHILIZED, FOR SOLUTION INTRAVENOUS at 13:15

## 2021-05-25 RX ADMIN — DOXORUBICIN HYDROCHLORIDE 600 MILLIGRAM(S): 2 INJECTION, SOLUTION INTRAVENOUS at 13:15

## 2021-05-25 RX ADMIN — ONDANSETRON 16 MILLIGRAM(S): 8 TABLET, FILM COATED ORAL at 11:00

## 2021-05-25 RX ADMIN — DEXRAZOXANE FOR INJECTION 700 MILLIGRAM(S): 500 INJECTION, POWDER, LYOPHILIZED, FOR SOLUTION INTRAVENOUS at 13:00

## 2021-05-25 RX ADMIN — FOSAPREPITANT DIMEGLUMINE 300 MILLIGRAM(S): 150 INJECTION, POWDER, LYOPHILIZED, FOR SOLUTION INTRAVENOUS at 11:00

## 2021-05-25 RX ADMIN — DEXRAZOXANE FOR INJECTION 700 MILLIGRAM(S): 500 INJECTION, POWDER, LYOPHILIZED, FOR SOLUTION INTRAVENOUS at 12:45

## 2021-05-25 RX ADMIN — Medication 300 MILLIGRAM(S): at 13:47

## 2021-05-25 RX ADMIN — Medication 8 MILLIGRAM(S): at 10:39

## 2021-05-25 NOTE — HISTORY OF PRESENT ILLNESS
[Disease: _____________________] : Disease: [unfilled] [T: ___] : T[unfilled] [N: ___] : N[unfilled] [M: ___] : M[unfilled] [AJCC Stage: ____] : AJCC Stage: [unfilled] [de-identified] : The patient is coming for a follow up to start her adjuvant chemotherapy with AC and Zinecard as planned.\par She had the Port inserted yesterday without complication.\par At present, she is feeling well with no particular complaints.\par She is on no new medications. [de-identified] : Poorly differentiated ductal cancer

## 2021-05-25 NOTE — REVIEW OF SYSTEMS
[Joint Stiffness] : joint stiffness [Skin Wound] : skin wound [Negative] : Heme/Lymph [de-identified] : Healed

## 2021-05-25 NOTE — ASSESSMENT
[FreeTextEntry1] : 1. A new, left breast poorly differentiated breast carcinoma, stage IB, triple negative, Ki-67 80%. starting adjuvant chemotherapy today with AC and Zinecard which will be followed by T.\par 2. Cardiac-wise, her EF was within normal but there was diastolic dysfunction. There has been no particular recommendation from the cardiologist otherwise.\par \par The situation was discussed with the patient again.\par We will proceed with AC as planned, every 3 weeks instead of 2.  After 2 cycles, the echocardiogram will be repeated. After that, will decide whether to go all the way to 4 cycles or truncate and switch to T or TC.\par \par All questions answered.\par \par F/U in 3 weeks. The CBC will be repeated on day 8 or 9 from today to see if there will be any significant drop of her counts. If the WBC count goes too low, Neulasta or equivalent will be administered with subsequent course.\par

## 2021-05-25 NOTE — PHYSICAL EXAM
[Fully active, able to carry on all pre-disease performance without restriction] : Status 0 - Fully active, able to carry on all pre-disease performance without restriction [Normal] : affect appropriate [de-identified] : Somewhat overweight [de-identified] : New Port inserted at the left upper chest wall area. [de-identified] : M

## 2021-05-25 NOTE — CONSULT LETTER
[Dear  ___] : Dear ~PAM, [Courtesy Letter:] : I had the pleasure of seeing your patient, [unfilled], in my office today. [Please see my note below.] : Please see my note below. [Consult Closing:] : Thank you very much for allowing me to participate in the care of this patient.  If you have any questions, please do not hesitate to contact me. [Sincerely,] : Sincerely, [FreeTextEntry2] : Dr. Chris Monte [FreeTextEntry3] : Dr. MADELYN Ramos [DrDeanna  ___] : Dr. RANDLE

## 2021-06-02 ENCOUNTER — APPOINTMENT (OUTPATIENT)
Dept: HEMATOLOGY ONCOLOGY | Facility: CLINIC | Age: 76
End: 2021-06-02
Payer: MEDICARE

## 2021-06-02 ENCOUNTER — APPOINTMENT (OUTPATIENT)
Dept: INTERVENTIONAL RADIOLOGY/VASCULAR | Facility: CLINIC | Age: 76
End: 2021-06-02

## 2021-06-02 ENCOUNTER — LABORATORY RESULT (OUTPATIENT)
Age: 76
End: 2021-06-02

## 2021-06-02 LAB
HCT VFR BLD CALC: 38.3 %
HGB BLD-MCNC: 13 G/DL
MCHC RBC-ENTMCNC: 29.7 PG
MCHC RBC-ENTMCNC: 33.9 G/DL
MCV RBC AUTO: 87.4 FL
PLATELET # BLD AUTO: 93 K/UL
PMV BLD: 12.1 FL
RBC # BLD: 4.38 M/UL
RBC # FLD: 12.5 %
WBC # FLD AUTO: 3.06 K/UL

## 2021-06-02 PROCEDURE — 99212 OFFICE O/P EST SF 10 MIN: CPT

## 2021-06-02 NOTE — PHYSICAL EXAM
[Alert] : alert [No Acute Distress] : no acute distress [Well Nourished] : well nourished [Well Developed] : well developed [Normal Sclera/Conjunctiva] : normal sclera/conjunctiva [PERRL] : pupils equal, round and reactive to light [No Neck Mass] : no neck mass was observed [Supple] : the neck was supple [No Respiratory Distress] : no respiratory distress [Normal Rate and Effort] : normal respiratory rhythm and effort [No Accessory Muscle Use] : no accessory muscle use [Normal PMI] : the apical impulse was normal [Normal Rate] : heart rate was normal  [Not Tender] : non-tender [Soft] : abdomen soft [No Rash] : no rash [No Skin Lesions] : no skin lesions [Oriented x3] : oriented to person, place, and time [Normal Insight/Judgement] : insight and judgment were intact [Normal Affect] : the affect was normal [Normal Mood] : the mood was normal [Restricted in physically strenuous activity but ambulatory and able to carry out work of a light or sedentary nature] : Restricted in physically strenuous activity but ambulatory and able to carry out work of a light or sedentary nature, e.g., light house work, office work

## 2021-06-02 NOTE — REVIEW OF SYSTEMS
[Fever] : no fever [Chills] : no chills [Feeling Poorly] : not feeling poorly [Feeling Tired] : not feeling tired [Eye Pain] : no eye pain [Red Eyes] : eyes not red [Heart Rate Is Slow] : the heart rate was not slow [Heart Rate Is Fast] : the heart rate was not fast [Chest Pain] : no chest pain [Palpitations] : no palpitations [Shortness Of Breath] : no shortness of breath [Wheezing] : no wheezing [Cough] : no cough [SOB on Exertion] : no shortness of breath during exertion [Abdominal Pain] : no abdominal pain [Constipation] : no constipation [Diarrhea] : no diarrhea [Skin Lesions] : no skin lesions [Skin Wound] : no skin wound [Confused] : no confusion

## 2021-06-02 NOTE — ASSESSMENT
[FreeTextEntry1] : 75yo F recently diagnosed with left breast carcinoma presents today s/p port a catheter placement on 5/24 with Dr. Rose.\par \par Patient appears well, offers no complaints or concerns at this time. Patient denies fevers/chills, or N/V/D.\par \par On examination, port a cath site looks clean, dry and intact, no erythema or swelling present. No tenderness or drainage noted on manipulation. Port access site also C/D/I. No erythema, swelling or tenderness noted. No neck pain, no limited ROM.  Dermabond on site, re-educated for patient to leave Dermabond on and gently wash around area until it falls off on its own. \par \par Educated patient on signs and symptoms of infection, business card given with extension, told to contact us for any further questions or concerns. \par \par Port a cath ok for continued use.\par \par No further IR follow up or management needed.\par

## 2021-06-02 NOTE — HISTORY OF PRESENT ILLNESS
[FreeTextEntry1] : 77yo F recently diagnosed with left breast carcinoma presents today s/p port a catheter placement on 5/24 with Dr. Rose. [0] : ~His/Her~ pain was 0 out of 10

## 2021-06-04 DIAGNOSIS — I10 ESSENTIAL (PRIMARY) HYPERTENSION: ICD-10-CM

## 2021-06-04 DIAGNOSIS — C50.919 MALIGNANT NEOPLASM OF UNSPECIFIED SITE OF UNSPECIFIED FEMALE BREAST: ICD-10-CM

## 2021-06-04 DIAGNOSIS — E78.00 PURE HYPERCHOLESTEROLEMIA, UNSPECIFIED: ICD-10-CM

## 2021-06-04 DIAGNOSIS — Z86.73 PERSONAL HISTORY OF TRANSIENT ISCHEMIC ATTACK (TIA), AND CEREBRAL INFARCTION WITHOUT RESIDUAL DEFICITS: ICD-10-CM

## 2021-06-04 DIAGNOSIS — Z88.0 ALLERGY STATUS TO PENICILLIN: ICD-10-CM

## 2021-06-04 DIAGNOSIS — Z79.82 LONG TERM (CURRENT) USE OF ASPIRIN: ICD-10-CM

## 2021-06-15 ENCOUNTER — APPOINTMENT (OUTPATIENT)
Dept: HEMATOLOGY ONCOLOGY | Facility: CLINIC | Age: 76
End: 2021-06-15
Payer: MEDICARE

## 2021-06-15 ENCOUNTER — NON-APPOINTMENT (OUTPATIENT)
Age: 76
End: 2021-06-15

## 2021-06-15 ENCOUNTER — LABORATORY RESULT (OUTPATIENT)
Age: 76
End: 2021-06-15

## 2021-06-15 ENCOUNTER — APPOINTMENT (OUTPATIENT)
Dept: INFUSION THERAPY | Facility: CLINIC | Age: 76
End: 2021-06-15
Payer: MEDICARE

## 2021-06-15 VITALS
TEMPERATURE: 97.5 F | BODY MASS INDEX: 31.07 KG/M2 | HEART RATE: 74 BPM | SYSTOLIC BLOOD PRESSURE: 188 MMHG | HEIGHT: 64 IN | WEIGHT: 182 LBS | DIASTOLIC BLOOD PRESSURE: 82 MMHG | RESPIRATION RATE: 14 BRPM

## 2021-06-15 VITALS
TEMPERATURE: 97.5 F | HEART RATE: 74 BPM | BODY MASS INDEX: 31.07 KG/M2 | SYSTOLIC BLOOD PRESSURE: 188 MMHG | WEIGHT: 182 LBS | HEIGHT: 64 IN | RESPIRATION RATE: 14 BRPM | DIASTOLIC BLOOD PRESSURE: 82 MMHG

## 2021-06-15 LAB
HCT VFR BLD CALC: 37.2 %
HGB BLD-MCNC: 12.6 G/DL
MCHC RBC-ENTMCNC: 29.8 PG
MCHC RBC-ENTMCNC: 33.9 G/DL
MCV RBC AUTO: 87.9 FL
PLATELET # BLD AUTO: 169 K/UL
PMV BLD: 11.5 FL
RBC # BLD: 4.23 M/UL
RBC # FLD: 13.2 %
WBC # FLD AUTO: 7 K/UL

## 2021-06-15 PROCEDURE — 99214 OFFICE O/P EST MOD 30 MIN: CPT

## 2021-06-15 RX ORDER — DOXORUBICIN HYDROCHLORIDE 2 MG/ML
100 INJECTION, SOLUTION INTRAVENOUS ONCE
Refills: 0 | Status: COMPLETED | OUTPATIENT
Start: 2021-06-15 | End: 2021-06-15

## 2021-06-15 RX ORDER — DEXRAZOXANE FOR INJECTION 500 MG/50ML
500 INJECTION, POWDER, LYOPHILIZED, FOR SOLUTION INTRAVENOUS ONCE
Refills: 0 | Status: COMPLETED | OUTPATIENT
Start: 2021-06-15 | End: 2021-06-15

## 2021-06-15 RX ORDER — DEXAMETHASONE 0.5 MG/5ML
8 ELIXIR ORAL ONCE
Refills: 0 | Status: COMPLETED | OUTPATIENT
Start: 2021-06-15 | End: 2021-06-15

## 2021-06-15 RX ORDER — CYCLOPHOSPHAMIDE 100 MG
1000 VIAL (EA) INTRAVENOUS ONCE
Refills: 0 | Status: COMPLETED | OUTPATIENT
Start: 2021-06-15 | End: 2021-06-15

## 2021-06-15 RX ORDER — FOSAPREPITANT DIMEGLUMINE 150 MG/5ML
150 INJECTION, POWDER, LYOPHILIZED, FOR SOLUTION INTRAVENOUS ONCE
Refills: 0 | Status: COMPLETED | OUTPATIENT
Start: 2021-06-15 | End: 2021-06-15

## 2021-06-15 RX ORDER — ONDANSETRON 8 MG/1
16 TABLET, FILM COATED ORAL ONCE
Refills: 0 | Status: COMPLETED | OUTPATIENT
Start: 2021-06-15 | End: 2021-06-15

## 2021-06-15 RX ADMIN — ONDANSETRON 16 MILLIGRAM(S): 8 TABLET, FILM COATED ORAL at 10:18

## 2021-06-15 RX ADMIN — Medication 8 MILLIGRAM(S): at 09:58

## 2021-06-15 RX ADMIN — DEXRAZOXANE FOR INJECTION 700 MILLIGRAM(S): 500 INJECTION, POWDER, LYOPHILIZED, FOR SOLUTION INTRAVENOUS at 10:56

## 2021-06-15 RX ADMIN — FOSAPREPITANT DIMEGLUMINE 300 MILLIGRAM(S): 150 INJECTION, POWDER, LYOPHILIZED, FOR SOLUTION INTRAVENOUS at 09:58

## 2021-06-15 RX ADMIN — ONDANSETRON 116 MILLIGRAM(S): 8 TABLET, FILM COATED ORAL at 09:58

## 2021-06-15 RX ADMIN — FOSAPREPITANT DIMEGLUMINE 150 MILLIGRAM(S): 150 INJECTION, POWDER, LYOPHILIZED, FOR SOLUTION INTRAVENOUS at 10:38

## 2021-06-15 RX ADMIN — DEXRAZOXANE FOR INJECTION 700 MILLIGRAM(S): 500 INJECTION, POWDER, LYOPHILIZED, FOR SOLUTION INTRAVENOUS at 10:55

## 2021-06-15 RX ADMIN — DOXORUBICIN HYDROCHLORIDE 600 MILLIGRAM(S): 2 INJECTION, SOLUTION INTRAVENOUS at 11:21

## 2021-06-15 RX ADMIN — Medication 300 MILLIGRAM(S): at 11:21

## 2021-06-15 NOTE — REVIEW OF SYSTEMS
[Recent Change In Weight] : ~T recent weight change [Dysuria] : dysuria [Joint Stiffness] : joint stiffness [Negative] : Heme/Lymph [FreeTextEntry9] : Especially the knees [FreeTextEntry2] : Has gained about 5 lbs

## 2021-06-15 NOTE — PHYSICAL EXAM
[Fully active, able to carry on all pre-disease performance without restriction] : Status 0 - Fully active, able to carry on all pre-disease performance without restriction [Obese] : obese [Normal] : affect appropriate [de-identified] : Significant varicose veins and varicosities lower extremities [de-identified] : Arthritic changes

## 2021-06-15 NOTE — ASSESSMENT
[FreeTextEntry1] : Stage IB, left breast, poorly differentiated carcinoma, triple negative.\par Will proceed with cycle #2 of AC with Zinecard (since she was treated in the past with a full course of Adriamycin based regimen in the past for her right sided breast cancer).\par The CBC today is adequate. She did not require any Neulasta.\par \par Will repeat the echocardiogram in a couple of weeks.\par All her questions answered.\par \par Further recommendations, as needed after the echo.\par Otherwise, she will be seen again in 3 weeks for follow up\par \par

## 2021-06-15 NOTE — HISTORY OF PRESENT ILLNESS
[Disease: _____________________] : Disease: [unfilled] [AJCC Stage: ____] : AJCC Stage: [unfilled] [de-identified] : The patient is coming for her regularly scheduled follow up for her new primary breast cancer, poorly differentiated ductal, triple negative.\par The patient is status post one cycle of chemotherapy with AC which she has tolerated well with only mild nausea for a few days, not requiring the intake of any antiemetic.\par At present, she feels well. She is just concerned about having a recurrent UTI.\par She is on no new medications. [de-identified] : Poorly differentiated ductal [de-identified] : Triple negative

## 2021-06-21 ENCOUNTER — LABORATORY RESULT (OUTPATIENT)
Age: 76
End: 2021-06-21

## 2021-06-21 ENCOUNTER — OUTPATIENT (OUTPATIENT)
Dept: OUTPATIENT SERVICES | Facility: HOSPITAL | Age: 76
LOS: 1 days | Discharge: HOME | End: 2021-06-21

## 2021-06-21 DIAGNOSIS — Z11.59 ENCOUNTER FOR SCREENING FOR OTHER VIRAL DISEASES: ICD-10-CM

## 2021-06-21 DIAGNOSIS — Z90.11 ACQUIRED ABSENCE OF RIGHT BREAST AND NIPPLE: Chronic | ICD-10-CM

## 2021-06-21 DIAGNOSIS — Z98.890 OTHER SPECIFIED POSTPROCEDURAL STATES: Chronic | ICD-10-CM

## 2021-06-21 DIAGNOSIS — Z90.12 ACQUIRED ABSENCE OF LEFT BREAST AND NIPPLE: Chronic | ICD-10-CM

## 2021-06-23 ENCOUNTER — FORM ENCOUNTER (OUTPATIENT)
Age: 76
End: 2021-06-23

## 2021-06-24 ENCOUNTER — OUTPATIENT (OUTPATIENT)
Dept: OUTPATIENT SERVICES | Facility: HOSPITAL | Age: 76
LOS: 1 days | Discharge: HOME | End: 2021-06-24

## 2021-06-24 DIAGNOSIS — Z90.11 ACQUIRED ABSENCE OF RIGHT BREAST AND NIPPLE: Chronic | ICD-10-CM

## 2021-06-24 DIAGNOSIS — Z98.890 OTHER SPECIFIED POSTPROCEDURAL STATES: Chronic | ICD-10-CM

## 2021-06-24 DIAGNOSIS — Z51.11 ENCOUNTER FOR ANTINEOPLASTIC CHEMOTHERAPY: ICD-10-CM

## 2021-06-24 DIAGNOSIS — Z90.12 ACQUIRED ABSENCE OF LEFT BREAST AND NIPPLE: Chronic | ICD-10-CM

## 2021-06-24 DIAGNOSIS — C50.919 MALIGNANT NEOPLASM OF UNSPECIFIED SITE OF UNSPECIFIED FEMALE BREAST: ICD-10-CM

## 2021-07-06 ENCOUNTER — OUTPATIENT (OUTPATIENT)
Dept: OUTPATIENT SERVICES | Facility: HOSPITAL | Age: 76
LOS: 1 days | Discharge: HOME | End: 2021-07-06

## 2021-07-06 ENCOUNTER — APPOINTMENT (OUTPATIENT)
Dept: INFUSION THERAPY | Facility: CLINIC | Age: 76
End: 2021-07-06
Payer: MEDICARE

## 2021-07-06 ENCOUNTER — APPOINTMENT (OUTPATIENT)
Dept: HEMATOLOGY ONCOLOGY | Facility: CLINIC | Age: 76
End: 2021-07-06
Payer: MEDICARE

## 2021-07-06 ENCOUNTER — LABORATORY RESULT (OUTPATIENT)
Age: 76
End: 2021-07-06

## 2021-07-06 VITALS
WEIGHT: 175 LBS | HEART RATE: 84 BPM | BODY MASS INDEX: 29.88 KG/M2 | HEIGHT: 64 IN | SYSTOLIC BLOOD PRESSURE: 151 MMHG | RESPIRATION RATE: 14 BRPM | TEMPERATURE: 98.3 F | DIASTOLIC BLOOD PRESSURE: 82 MMHG

## 2021-07-06 VITALS
BODY MASS INDEX: 29.88 KG/M2 | WEIGHT: 175 LBS | DIASTOLIC BLOOD PRESSURE: 82 MMHG | HEART RATE: 84 BPM | TEMPERATURE: 98.3 F | HEIGHT: 64 IN | SYSTOLIC BLOOD PRESSURE: 151 MMHG

## 2021-07-06 DIAGNOSIS — C50.919 MALIGNANT NEOPLASM OF UNSPECIFIED SITE OF UNSPECIFIED FEMALE BREAST: ICD-10-CM

## 2021-07-06 DIAGNOSIS — Z98.890 OTHER SPECIFIED POSTPROCEDURAL STATES: Chronic | ICD-10-CM

## 2021-07-06 DIAGNOSIS — Z90.12 ACQUIRED ABSENCE OF LEFT BREAST AND NIPPLE: Chronic | ICD-10-CM

## 2021-07-06 DIAGNOSIS — Z90.11 ACQUIRED ABSENCE OF RIGHT BREAST AND NIPPLE: Chronic | ICD-10-CM

## 2021-07-06 LAB
HCT VFR BLD CALC: 39 %
HGB BLD-MCNC: 13.4 G/DL
MCHC RBC-ENTMCNC: 30.1 PG
MCHC RBC-ENTMCNC: 34.4 G/DL
MCV RBC AUTO: 87.6 FL
PLATELET # BLD AUTO: 166 K/UL
PMV BLD: 11.4 FL
RBC # BLD: 4.45 M/UL
RBC # FLD: 13.7 %
WBC # FLD AUTO: 6.87 K/UL

## 2021-07-06 PROCEDURE — 99214 OFFICE O/P EST MOD 30 MIN: CPT

## 2021-07-06 RX ORDER — DEXRAZOXANE FOR INJECTION 500 MG/50ML
500 INJECTION, POWDER, LYOPHILIZED, FOR SOLUTION INTRAVENOUS ONCE
Refills: 0 | Status: COMPLETED | OUTPATIENT
Start: 2021-07-06 | End: 2021-07-06

## 2021-07-06 RX ORDER — FOSAPREPITANT DIMEGLUMINE 150 MG/5ML
150 INJECTION, POWDER, LYOPHILIZED, FOR SOLUTION INTRAVENOUS ONCE
Refills: 0 | Status: COMPLETED | OUTPATIENT
Start: 2021-07-06 | End: 2021-07-06

## 2021-07-06 RX ORDER — CYCLOPHOSPHAMIDE 500 MG/25ML
500 INJECTION, POWDER, FOR SOLUTION INTRAVENOUS; ORAL
Qty: 1 | Refills: 0 | Status: COMPLETED | OUTPATIENT
Start: 2021-07-06

## 2021-07-06 RX ORDER — DEXAMETHASONE 0.5 MG/5ML
8 ELIXIR ORAL ONCE
Refills: 0 | Status: COMPLETED | OUTPATIENT
Start: 2021-07-06 | End: 2021-07-06

## 2021-07-06 RX ORDER — CYCLOPHOSPHAMIDE 100 MG
1000 VIAL (EA) INTRAVENOUS ONCE
Refills: 0 | Status: COMPLETED | OUTPATIENT
Start: 2021-07-06 | End: 2021-07-06

## 2021-07-06 RX ORDER — DOXORUBICIN HYDROCHLORIDE 2 MG/ML
10 INJECTION, POWDER, LYOPHILIZED, FOR SOLUTION INTRAVENOUS
Refills: 0 | Status: COMPLETED | OUTPATIENT
Start: 2021-07-06

## 2021-07-06 RX ORDER — DOXORUBICIN HYDROCHLORIDE 2 MG/ML
100 INJECTION, SOLUTION INTRAVENOUS ONCE
Refills: 0 | Status: COMPLETED | OUTPATIENT
Start: 2021-07-06 | End: 2021-07-06

## 2021-07-06 RX ORDER — ONDANSETRON 8 MG/1
16 TABLET, FILM COATED ORAL ONCE
Refills: 0 | Status: COMPLETED | OUTPATIENT
Start: 2021-07-06 | End: 2021-07-06

## 2021-07-06 RX ADMIN — ONDANSETRON 16 MILLIGRAM(S): 8 TABLET, FILM COATED ORAL at 10:59

## 2021-07-06 RX ADMIN — Medication 8 MILLIGRAM(S): at 10:39

## 2021-07-06 RX ADMIN — FOSAPREPITANT DIMEGLUMINE 300 MILLIGRAM(S): 150 INJECTION, POWDER, LYOPHILIZED, FOR SOLUTION INTRAVENOUS at 10:39

## 2021-07-06 RX ADMIN — FOSAPREPITANT DIMEGLUMINE 150 MILLIGRAM(S): 150 INJECTION, POWDER, LYOPHILIZED, FOR SOLUTION INTRAVENOUS at 11:19

## 2021-07-06 RX ADMIN — ONDANSETRON 116 MILLIGRAM(S): 8 TABLET, FILM COATED ORAL at 10:39

## 2021-07-06 RX ADMIN — DEXRAZOXANE FOR INJECTION 700 MILLIGRAM(S): 500 INJECTION, POWDER, LYOPHILIZED, FOR SOLUTION INTRAVENOUS at 11:40

## 2021-07-06 RX ADMIN — DOXORUBICIN HYDROCHLORIDE 600 MILLIGRAM(S): 2 INJECTION, SOLUTION INTRAVENOUS at 11:43

## 2021-07-06 RX ADMIN — Medication 300 MILLIGRAM(S): at 11:42

## 2021-07-06 RX ADMIN — DEXRAZOXANE FOR INJECTION 700 MILLIGRAM(S): 500 INJECTION, POWDER, LYOPHILIZED, FOR SOLUTION INTRAVENOUS at 11:42

## 2021-07-27 ENCOUNTER — APPOINTMENT (OUTPATIENT)
Dept: INFUSION THERAPY | Facility: CLINIC | Age: 76
End: 2021-07-27
Payer: MEDICARE

## 2021-07-27 ENCOUNTER — APPOINTMENT (OUTPATIENT)
Dept: HEMATOLOGY ONCOLOGY | Facility: CLINIC | Age: 76
End: 2021-07-27
Payer: MEDICARE

## 2021-07-27 ENCOUNTER — LABORATORY RESULT (OUTPATIENT)
Age: 76
End: 2021-07-27

## 2021-07-27 VITALS
DIASTOLIC BLOOD PRESSURE: 66 MMHG | HEART RATE: 83 BPM | BODY MASS INDEX: 29.88 KG/M2 | RESPIRATION RATE: 14 BRPM | TEMPERATURE: 97.4 F | SYSTOLIC BLOOD PRESSURE: 144 MMHG | HEIGHT: 64 IN | WEIGHT: 175 LBS

## 2021-07-27 LAB
HCT VFR BLD CALC: 37.5 %
HGB BLD-MCNC: 12.8 G/DL
MCHC RBC-ENTMCNC: 30.7 PG
MCHC RBC-ENTMCNC: 34.1 G/DL
MCV RBC AUTO: 89.9 FL
PLATELET # BLD AUTO: 251 K/UL
PMV BLD: 10.8 FL
RBC # BLD: 4.17 M/UL
RBC # FLD: 14.7 %
WBC # FLD AUTO: 6.41 K/UL

## 2021-07-27 PROCEDURE — 99214 OFFICE O/P EST MOD 30 MIN: CPT

## 2021-07-27 RX ORDER — DEXRAZOXANE FOR INJECTION 500 MG/50ML
500 INJECTION, POWDER, LYOPHILIZED, FOR SOLUTION INTRAVENOUS ONCE
Refills: 0 | Status: COMPLETED | OUTPATIENT
Start: 2021-07-27 | End: 2021-07-27

## 2021-07-27 RX ORDER — ONDANSETRON 8 MG/1
16 TABLET, FILM COATED ORAL ONCE
Refills: 0 | Status: COMPLETED | OUTPATIENT
Start: 2021-07-27 | End: 2021-07-27

## 2021-07-27 RX ORDER — DOXORUBICIN HYDROCHLORIDE 2 MG/ML
100 INJECTION, SOLUTION INTRAVENOUS ONCE
Refills: 0 | Status: COMPLETED | OUTPATIENT
Start: 2021-07-27 | End: 2021-07-27

## 2021-07-27 RX ORDER — FOSAPREPITANT DIMEGLUMINE 150 MG/5ML
150 INJECTION, POWDER, LYOPHILIZED, FOR SOLUTION INTRAVENOUS ONCE
Refills: 0 | Status: COMPLETED | OUTPATIENT
Start: 2021-07-27 | End: 2021-07-27

## 2021-07-27 RX ORDER — CYCLOPHOSPHAMIDE 100 MG
1000 VIAL (EA) INTRAVENOUS ONCE
Refills: 0 | Status: COMPLETED | OUTPATIENT
Start: 2021-07-27 | End: 2021-07-27

## 2021-07-27 RX ADMIN — ONDANSETRON 16 MILLIGRAM(S): 8 TABLET, FILM COATED ORAL at 11:48

## 2021-07-27 RX ADMIN — Medication 300 MILLIGRAM(S): at 12:42

## 2021-07-27 RX ADMIN — DOXORUBICIN HYDROCHLORIDE 600 MILLIGRAM(S): 2 INJECTION, SOLUTION INTRAVENOUS at 12:41

## 2021-07-27 RX ADMIN — ONDANSETRON 116 MILLIGRAM(S): 8 TABLET, FILM COATED ORAL at 11:28

## 2021-07-27 RX ADMIN — DEXRAZOXANE FOR INJECTION 700 MILLIGRAM(S): 500 INJECTION, POWDER, LYOPHILIZED, FOR SOLUTION INTRAVENOUS at 12:40

## 2021-07-27 RX ADMIN — DEXRAZOXANE FOR INJECTION 700 MILLIGRAM(S): 500 INJECTION, POWDER, LYOPHILIZED, FOR SOLUTION INTRAVENOUS at 12:41

## 2021-07-27 RX ADMIN — FOSAPREPITANT DIMEGLUMINE 150 MILLIGRAM(S): 150 INJECTION, POWDER, LYOPHILIZED, FOR SOLUTION INTRAVENOUS at 12:08

## 2021-07-27 RX ADMIN — FOSAPREPITANT DIMEGLUMINE 300 MILLIGRAM(S): 150 INJECTION, POWDER, LYOPHILIZED, FOR SOLUTION INTRAVENOUS at 11:48

## 2021-07-27 NOTE — PHYSICAL EXAM
[Fully active, able to carry on all pre-disease performance without restriction] : Status 0 - Fully active, able to carry on all pre-disease performance without restriction [Obese] : obese [Normal] : affect appropriate [de-identified] : Some arthritic changes as before [de-identified] : Difficulty getting up from a sitting position

## 2021-07-27 NOTE — ASSESSMENT
[FreeTextEntry1] : 1. Breast carcinoma, stage IB, triple negative, on chemotherapy with AC and Zinecard (she had received Adriamycin in the past), to proceed with cycle #4. So far, she seems to be tolerating the treatments well cardiac-wise.\par 2. Thigh weakness, bilateral and symmetrical, making difficult to get up from the chair. Otherwise, she has her old, persistent peripheral neuropathy unchanged.\par \par The patient will proceed with cycle #4 of AC and Zinecard. We will repeat the echocardiogram after this cycle. \par Will discontinue dexamethasone (although only on 8 mg) given her proximal muscle grade I weakness, especially that she is also on Emend. The CBC is adequate today. \par \par The situation was discussed with the patient. Reasons for the change explained although she was happy otherwise with the antinausea management.\par She was encouraged to remain physically active.\par \par She will be seen again in 3 weeks for follow up.\par \par All questions answered.\par \par \par

## 2021-07-27 NOTE — HISTORY OF PRESENT ILLNESS
[Disease: _____________________] : Disease: [unfilled] [AJCC Stage: ____] : AJCC Stage: [unfilled] [de-identified] : The patient is coming for a follow up for her stage IB breast carcinoma, triple negative, and continuation of her chemotherapy with AC.\par The patient is denying any new problems. She has her peripheral neuropathy which seems to be slightly worse according to her. \par Otherwise, she is doing well.\par No new medications.\par Her echocardiogram after the second cycle showed stable LVEF. [de-identified] : Poorly differentiated

## 2021-07-27 NOTE — REVIEW OF SYSTEMS
[Joint Stiffness] : joint stiffness [Negative] : Heme/Lymph [FreeTextEntry9] : Specially the knees. Difficulty getting up from the chair [de-identified] : Numbness and tingling peripherally. Difficulty getting off the chair.

## 2021-08-08 ENCOUNTER — FORM ENCOUNTER (OUTPATIENT)
Age: 76
End: 2021-08-08

## 2021-08-09 ENCOUNTER — OUTPATIENT (OUTPATIENT)
Dept: OUTPATIENT SERVICES | Facility: HOSPITAL | Age: 76
LOS: 1 days | Discharge: HOME | End: 2021-08-09
Payer: MEDICARE

## 2021-08-09 DIAGNOSIS — C50.919 MALIGNANT NEOPLASM OF UNSPECIFIED SITE OF UNSPECIFIED FEMALE BREAST: ICD-10-CM

## 2021-08-09 DIAGNOSIS — Z90.11 ACQUIRED ABSENCE OF RIGHT BREAST AND NIPPLE: Chronic | ICD-10-CM

## 2021-08-09 DIAGNOSIS — Z98.890 OTHER SPECIFIED POSTPROCEDURAL STATES: Chronic | ICD-10-CM

## 2021-08-09 DIAGNOSIS — Z90.12 ACQUIRED ABSENCE OF LEFT BREAST AND NIPPLE: Chronic | ICD-10-CM

## 2021-08-09 PROCEDURE — 93306 TTE W/DOPPLER COMPLETE: CPT | Mod: 26

## 2021-08-17 ENCOUNTER — APPOINTMENT (OUTPATIENT)
Dept: INFUSION THERAPY | Facility: CLINIC | Age: 76
End: 2021-08-17

## 2021-08-17 ENCOUNTER — APPOINTMENT (OUTPATIENT)
Dept: HEMATOLOGY ONCOLOGY | Facility: CLINIC | Age: 76
End: 2021-08-17
Payer: MEDICARE

## 2021-08-17 ENCOUNTER — LABORATORY RESULT (OUTPATIENT)
Age: 76
End: 2021-08-17

## 2021-08-17 VITALS
HEIGHT: 64 IN | BODY MASS INDEX: 30.39 KG/M2 | RESPIRATION RATE: 14 BRPM | WEIGHT: 178 LBS | HEART RATE: 43 BPM | DIASTOLIC BLOOD PRESSURE: 92 MMHG | SYSTOLIC BLOOD PRESSURE: 169 MMHG | TEMPERATURE: 97.8 F

## 2021-08-17 LAB
HCT VFR BLD CALC: 35.5 %
HGB BLD-MCNC: 12.1 G/DL
MCHC RBC-ENTMCNC: 30.6 PG
MCHC RBC-ENTMCNC: 34.1 G/DL
MCV RBC AUTO: 89.9 FL
PLATELET # BLD AUTO: 234 K/UL
PMV BLD: 10.6 FL
RBC # BLD: 3.95 M/UL
RBC # FLD: 15.6 %
WBC # FLD AUTO: 5.62 K/UL

## 2021-08-17 PROCEDURE — 99214 OFFICE O/P EST MOD 30 MIN: CPT

## 2021-08-17 RX ORDER — ONDANSETRON 8 MG/1
16 TABLET, FILM COATED ORAL ONCE
Refills: 0 | Status: COMPLETED | OUTPATIENT
Start: 2021-08-17 | End: 2021-08-17

## 2021-08-17 RX ORDER — FOSAPREPITANT DIMEGLUMINE 150 MG/5ML
150 INJECTION, POWDER, LYOPHILIZED, FOR SOLUTION INTRAVENOUS ONCE
Refills: 0 | Status: COMPLETED | OUTPATIENT
Start: 2021-08-17 | End: 2021-08-17

## 2021-08-17 RX ORDER — DEXRAZOXANE FOR INJECTION 500 MG/50ML
1000 INJECTION, POWDER, LYOPHILIZED, FOR SOLUTION INTRAVENOUS ONCE
Refills: 0 | Status: COMPLETED | OUTPATIENT
Start: 2021-08-17 | End: 2021-08-17

## 2021-08-17 RX ORDER — DOXORUBICIN HYDROCHLORIDE 2 MG/ML
100 INJECTION, SOLUTION INTRAVENOUS ONCE
Refills: 0 | Status: COMPLETED | OUTPATIENT
Start: 2021-08-17 | End: 2021-08-17

## 2021-08-17 RX ORDER — CYCLOPHOSPHAMIDE 100 MG
1000 VIAL (EA) INTRAVENOUS ONCE
Refills: 0 | Status: COMPLETED | OUTPATIENT
Start: 2021-08-17 | End: 2021-08-17

## 2021-08-17 RX ADMIN — Medication 300 MILLIGRAM(S): at 13:00

## 2021-08-17 RX ADMIN — DEXRAZOXANE FOR INJECTION 1000 MILLIGRAM(S): 500 INJECTION, POWDER, LYOPHILIZED, FOR SOLUTION INTRAVENOUS at 12:20

## 2021-08-17 RX ADMIN — DEXRAZOXANE FOR INJECTION 1400 MILLIGRAM(S): 500 INJECTION, POWDER, LYOPHILIZED, FOR SOLUTION INTRAVENOUS at 11:44

## 2021-08-17 RX ADMIN — FOSAPREPITANT DIMEGLUMINE 150 MILLIGRAM(S): 150 INJECTION, POWDER, LYOPHILIZED, FOR SOLUTION INTRAVENOUS at 11:31

## 2021-08-17 RX ADMIN — Medication 1000 MILLIGRAM(S): at 14:00

## 2021-08-17 RX ADMIN — ONDANSETRON 116 MILLIGRAM(S): 8 TABLET, FILM COATED ORAL at 10:51

## 2021-08-17 RX ADMIN — FOSAPREPITANT DIMEGLUMINE 300 MILLIGRAM(S): 150 INJECTION, POWDER, LYOPHILIZED, FOR SOLUTION INTRAVENOUS at 11:11

## 2021-08-17 RX ADMIN — DOXORUBICIN HYDROCHLORIDE 600 MILLIGRAM(S): 2 INJECTION, SOLUTION INTRAVENOUS at 12:25

## 2021-08-17 RX ADMIN — ONDANSETRON 16 MILLIGRAM(S): 8 TABLET, FILM COATED ORAL at 11:11

## 2021-08-17 NOTE — REVIEW OF SYSTEMS
[Fatigue] : fatigue [Constipation] : constipation [Joint Pain] : joint pain [Joint Stiffness] : joint stiffness [Negative] : Heme/Lymph [FreeTextEntry2] : Tired after the treatments for about 2 days [FreeTextEntry9] : Mostly knees. [de-identified] : Peripheral neuropathy not new

## 2021-08-17 NOTE — ASSESSMENT
[FreeTextEntry1] : Stage IB, triple negative,  poorly differentiated carcinoma of the left breast, S/P mastectomy, now followed by AC + Zinecard chemotherapy, due to start her 5th cycle.\par So far, she has tolerated the treatments well.\par Following the 6 cycle, the patient's echocardiogram will be repeated after the 6th and last cycle of the adjuvant treatment.\par The CBC today shows a WBC count of 5.62, Hgb 12.1, platelets 234 K.\par \par The patient will be seen again in 3 weeks, prior to the last cycle.\par \par All questions answered.\par

## 2021-08-17 NOTE — HISTORY OF PRESENT ILLNESS
[Disease: _____________________] : Disease: [unfilled] [AJCC Stage: ____] : AJCC Stage: [unfilled] [de-identified] : The patient is coming for her regularly scheduled follow up for her left breast carcinoma, S/P left mastectomy, and continuation of her 5th cycle of chemotherapy with AC and Zinecard.\par The patient has tolerated the treatments well without any significant complications.\par Her present complaint is related to constipation, "not too bad, not getting the urge to go". Same peripheral neuropathy otherwise.\par No new medications. \par Her echocardiogram after the 4 cycle was unchanged. [de-identified] : Poorly differentiated.

## 2021-08-17 NOTE — PHYSICAL EXAM
[Fully active, able to carry on all pre-disease performance without restriction] : Status 0 - Fully active, able to carry on all pre-disease performance without restriction [Normal] : affect appropriate [de-identified] : But somewhat overweight [de-identified] : Varicosities present on the lower extremities [de-identified] : Arthritic changes as before

## 2021-09-07 ENCOUNTER — APPOINTMENT (OUTPATIENT)
Dept: HEMATOLOGY ONCOLOGY | Facility: CLINIC | Age: 76
End: 2021-09-07
Payer: MEDICARE

## 2021-09-07 ENCOUNTER — LABORATORY RESULT (OUTPATIENT)
Age: 76
End: 2021-09-07

## 2021-09-07 ENCOUNTER — APPOINTMENT (OUTPATIENT)
Dept: INFUSION THERAPY | Facility: CLINIC | Age: 76
End: 2021-09-07
Payer: MEDICARE

## 2021-09-07 VITALS
WEIGHT: 180 LBS | SYSTOLIC BLOOD PRESSURE: 139 MMHG | TEMPERATURE: 97.2 F | HEART RATE: 83 BPM | RESPIRATION RATE: 14 BRPM | HEIGHT: 64 IN | BODY MASS INDEX: 30.73 KG/M2 | DIASTOLIC BLOOD PRESSURE: 63 MMHG

## 2021-09-07 DIAGNOSIS — Z51.11 ENCOUNTER FOR ANTINEOPLASTIC CHEMOTHERAPY: ICD-10-CM

## 2021-09-07 PROCEDURE — 99214 OFFICE O/P EST MOD 30 MIN: CPT

## 2021-09-07 RX ORDER — ONDANSETRON 8 MG/1
16 TABLET, FILM COATED ORAL ONCE
Refills: 0 | Status: COMPLETED | OUTPATIENT
Start: 2021-09-07 | End: 2021-09-07

## 2021-09-07 RX ORDER — CYCLOPHOSPHAMIDE 100 MG
1000 VIAL (EA) INTRAVENOUS ONCE
Refills: 0 | Status: COMPLETED | OUTPATIENT
Start: 2021-09-07 | End: 2021-09-07

## 2021-09-07 RX ORDER — DEXRAZOXANE FOR INJECTION 500 MG/50ML
1000 INJECTION, POWDER, LYOPHILIZED, FOR SOLUTION INTRAVENOUS ONCE
Refills: 0 | Status: COMPLETED | OUTPATIENT
Start: 2021-09-07 | End: 2021-09-07

## 2021-09-07 RX ORDER — DOXORUBICIN HYDROCHLORIDE 2 MG/ML
100 INJECTION, SOLUTION INTRAVENOUS ONCE
Refills: 0 | Status: COMPLETED | OUTPATIENT
Start: 2021-09-07 | End: 2021-09-07

## 2021-09-07 RX ORDER — FOSAPREPITANT DIMEGLUMINE 150 MG/5ML
150 INJECTION, POWDER, LYOPHILIZED, FOR SOLUTION INTRAVENOUS ONCE
Refills: 0 | Status: COMPLETED | OUTPATIENT
Start: 2021-09-07 | End: 2021-09-07

## 2021-09-07 RX ADMIN — ONDANSETRON 116 MILLIGRAM(S): 8 TABLET, FILM COATED ORAL at 10:53

## 2021-09-07 RX ADMIN — DEXRAZOXANE FOR INJECTION 1400 MILLIGRAM(S): 500 INJECTION, POWDER, LYOPHILIZED, FOR SOLUTION INTRAVENOUS at 12:37

## 2021-09-07 RX ADMIN — DOXORUBICIN HYDROCHLORIDE 600 MILLIGRAM(S): 2 INJECTION, SOLUTION INTRAVENOUS at 12:42

## 2021-09-07 RX ADMIN — FOSAPREPITANT DIMEGLUMINE 300 MILLIGRAM(S): 150 INJECTION, POWDER, LYOPHILIZED, FOR SOLUTION INTRAVENOUS at 10:53

## 2021-09-07 RX ADMIN — Medication 300 MILLIGRAM(S): at 12:41

## 2021-09-07 NOTE — PHYSICAL EXAM
[Fully active, able to carry on all pre-disease performance without restriction] : Status 0 - Fully active, able to carry on all pre-disease performance without restriction [Normal] : affect appropriate [de-identified] : But somewhat overweight. + alopecia [de-identified] : Varicosities present on the lower extremities [de-identified] : s/p bilateral mastectomy, no masses, no tenderness, no axillary lymphadenopathy [de-identified] : Arthritic changes as before

## 2021-09-07 NOTE — HISTORY OF PRESENT ILLNESS
[Disease: _____________________] : Disease: [unfilled] [AJCC Stage: ____] : AJCC Stage: [unfilled] [de-identified] : The patient is coming for her regularly scheduled follow up for her left breast carcinoma, S/P left mastectomy, and continuation of her 6th cycle of chemotherapy with AC and Zinecard.\par The patient has tolerated the treatments well without any significant complications.\par She offers no new complaints. Denies any fever, pain, N/V/D/C.  Same peripheral neuropathy otherwise.\par No new medications. \par Her echocardiogram after the 4 cycle was unchanged. EF=67% done on 8/9/21. [de-identified] : Poorly differentiated.

## 2021-09-07 NOTE — ASSESSMENT
[FreeTextEntry1] : Stage IB, triple negative,  poorly differentiated carcinoma of the left breast, S/P mastectomy, now followed by AC + Zinecard chemotherapy, due to start her 6th cycle.\par So far, she has tolerated the treatments well.\par Following the 6th cycle, the patient's echocardiogram will be repeated after the 6th and last cycle of the adjuvant treatment. ECHO rx given today.\par The CBC today shows a WBC count of 4.88, Hgb 11.3, platelets 190 K, ANC=2.44\par \par All questions answered.\par \par Follow up with Dr. Ramos in 3 weeks.\par

## 2021-09-07 NOTE — REVIEW OF SYSTEMS
[Fatigue] : fatigue [Constipation] : constipation [Joint Pain] : joint pain [Joint Stiffness] : joint stiffness [Negative] : Heme/Lymph [FreeTextEntry2] : Tired after the treatments for about 2 days [FreeTextEntry9] : Mostly knees. [de-identified] : Peripheral neuropathy not new

## 2021-09-13 LAB
ALBUMIN SERPL ELPH-MCNC: 4 G/DL
ALP BLD-CCNC: 65 U/L
ALT SERPL-CCNC: 12 U/L
ANION GAP SERPL CALC-SCNC: 13 MMOL/L
AST SERPL-CCNC: 15 U/L
BILIRUB DIRECT SERPL-MCNC: <0.2 MG/DL
BILIRUB INDIRECT SERPL-MCNC: >0.1 MG/DL
BILIRUB SERPL-MCNC: 0.3 MG/DL
BUN SERPL-MCNC: 13 MG/DL
CALCIUM SERPL-MCNC: 8.8 MG/DL
CHLORIDE SERPL-SCNC: 103 MMOL/L
CO2 SERPL-SCNC: 22 MMOL/L
CREAT SERPL-MCNC: 0.6 MG/DL
GLUCOSE SERPL-MCNC: 112 MG/DL
HCT VFR BLD CALC: 32.3 %
HGB BLD-MCNC: 11.3 G/DL
MCHC RBC-ENTMCNC: 32.1 PG
MCHC RBC-ENTMCNC: 35 G/DL
MCV RBC AUTO: 91.8 FL
PLATELET # BLD AUTO: 190 K/UL
PMV BLD: 10.9 FL
POTASSIUM SERPL-SCNC: 4.4 MMOL/L
PROT SERPL-MCNC: 6.4 G/DL
RBC # BLD: 3.52 M/UL
RBC # FLD: 15.8 %
SODIUM SERPL-SCNC: 138 MMOL/L
WBC # FLD AUTO: 4.88 K/UL

## 2021-09-20 ENCOUNTER — LABORATORY RESULT (OUTPATIENT)
Age: 76
End: 2021-09-20

## 2021-09-20 ENCOUNTER — OUTPATIENT (OUTPATIENT)
Dept: OUTPATIENT SERVICES | Facility: HOSPITAL | Age: 76
LOS: 1 days | Discharge: HOME | End: 2021-09-20

## 2021-09-20 DIAGNOSIS — Z11.59 ENCOUNTER FOR SCREENING FOR OTHER VIRAL DISEASES: ICD-10-CM

## 2021-09-20 DIAGNOSIS — Z98.890 OTHER SPECIFIED POSTPROCEDURAL STATES: Chronic | ICD-10-CM

## 2021-09-20 DIAGNOSIS — Z90.12 ACQUIRED ABSENCE OF LEFT BREAST AND NIPPLE: Chronic | ICD-10-CM

## 2021-09-20 DIAGNOSIS — Z90.11 ACQUIRED ABSENCE OF RIGHT BREAST AND NIPPLE: Chronic | ICD-10-CM

## 2021-09-22 ENCOUNTER — FORM ENCOUNTER (OUTPATIENT)
Age: 76
End: 2021-09-22

## 2021-09-23 ENCOUNTER — OUTPATIENT (OUTPATIENT)
Dept: OUTPATIENT SERVICES | Facility: HOSPITAL | Age: 76
LOS: 1 days | Discharge: HOME | End: 2021-09-23
Payer: MEDICARE

## 2021-09-23 DIAGNOSIS — C50.919 MALIGNANT NEOPLASM OF UNSPECIFIED SITE OF UNSPECIFIED FEMALE BREAST: ICD-10-CM

## 2021-09-23 DIAGNOSIS — Z90.12 ACQUIRED ABSENCE OF LEFT BREAST AND NIPPLE: Chronic | ICD-10-CM

## 2021-09-23 DIAGNOSIS — Z90.11 ACQUIRED ABSENCE OF RIGHT BREAST AND NIPPLE: Chronic | ICD-10-CM

## 2021-09-23 DIAGNOSIS — Z98.890 OTHER SPECIFIED POSTPROCEDURAL STATES: Chronic | ICD-10-CM

## 2021-09-23 PROCEDURE — 93306 TTE W/DOPPLER COMPLETE: CPT | Mod: 26

## 2021-10-07 ENCOUNTER — APPOINTMENT (OUTPATIENT)
Dept: INFUSION THERAPY | Facility: CLINIC | Age: 76
End: 2021-10-07

## 2021-10-07 ENCOUNTER — LABORATORY RESULT (OUTPATIENT)
Age: 76
End: 2021-10-07

## 2021-10-07 ENCOUNTER — OUTPATIENT (OUTPATIENT)
Dept: OUTPATIENT SERVICES | Facility: HOSPITAL | Age: 76
LOS: 1 days | Discharge: HOME | End: 2021-10-07

## 2021-10-07 ENCOUNTER — APPOINTMENT (OUTPATIENT)
Dept: HEMATOLOGY ONCOLOGY | Facility: CLINIC | Age: 76
End: 2021-10-07
Payer: MEDICARE

## 2021-10-07 VITALS
SYSTOLIC BLOOD PRESSURE: 180 MMHG | RESPIRATION RATE: 14 BRPM | WEIGHT: 180 LBS | HEART RATE: 86 BPM | HEIGHT: 64 IN | BODY MASS INDEX: 30.73 KG/M2 | TEMPERATURE: 97.2 F | DIASTOLIC BLOOD PRESSURE: 79 MMHG

## 2021-10-07 DIAGNOSIS — Z98.890 OTHER SPECIFIED POSTPROCEDURAL STATES: Chronic | ICD-10-CM

## 2021-10-07 DIAGNOSIS — Z51.11 ENCOUNTER FOR ANTINEOPLASTIC CHEMOTHERAPY: ICD-10-CM

## 2021-10-07 DIAGNOSIS — C50.919 MALIGNANT NEOPLASM OF UNSPECIFIED SITE OF UNSPECIFIED FEMALE BREAST: ICD-10-CM

## 2021-10-07 DIAGNOSIS — Z90.12 ACQUIRED ABSENCE OF LEFT BREAST AND NIPPLE: Chronic | ICD-10-CM

## 2021-10-07 DIAGNOSIS — Z90.11 ACQUIRED ABSENCE OF RIGHT BREAST AND NIPPLE: Chronic | ICD-10-CM

## 2021-10-07 PROCEDURE — 99214 OFFICE O/P EST MOD 30 MIN: CPT

## 2021-10-08 LAB
ALBUMIN SERPL ELPH-MCNC: 4.1 G/DL
ALP BLD-CCNC: 71 U/L
ALT SERPL-CCNC: 12 U/L
ANION GAP SERPL CALC-SCNC: 14 MMOL/L
AST SERPL-CCNC: 15 U/L
BILIRUB SERPL-MCNC: 0.2 MG/DL
BUN SERPL-MCNC: 13 MG/DL
CALCIUM SERPL-MCNC: 8.5 MG/DL
CANCER AG15-3 SERPL-ACNC: 21.7 U/ML
CEA SERPL-MCNC: 1.9 NG/ML
CHLORIDE SERPL-SCNC: 106 MMOL/L
CO2 SERPL-SCNC: 20 MMOL/L
CREAT SERPL-MCNC: 0.6 MG/DL
GLUCOSE SERPL-MCNC: 95 MG/DL
HCT VFR BLD CALC: 34.3 %
HGB BLD-MCNC: 11.4 G/DL
MCHC RBC-ENTMCNC: 31.3 PG
MCHC RBC-ENTMCNC: 33.2 G/DL
MCV RBC AUTO: 94.2 FL
PLATELET # BLD AUTO: 161 K/UL
PMV BLD: 12.5 FL
POTASSIUM SERPL-SCNC: 3.8 MMOL/L
PROT SERPL-MCNC: 6.5 G/DL
RBC # BLD: 3.64 M/UL
RBC # FLD: 15.2 %
SODIUM SERPL-SCNC: 140 MMOL/L
WBC # FLD AUTO: 7.1 K/UL

## 2021-10-08 NOTE — PHYSICAL EXAM
[Fully active, able to carry on all pre-disease performance without restriction] : Status 0 - Fully active, able to carry on all pre-disease performance without restriction [Normal] : affect appropriate [de-identified] : Varicosities present on the lower extremities [de-identified] : But somewhat overweight. + alopecia [de-identified] : S/P bilateral mastectomy, no masses, no tenderness, no axillary lymphadenopathy [de-identified] : Arthritic changes as before

## 2021-10-08 NOTE — ASSESSMENT
[FreeTextEntry1] : Stage IB, triple negative,  poorly differentiated carcinoma of the left breast, S/P mastectomy and completed 6 cycles of adjuvant chemo with AC + Zinecard (to prevent cardiac toxicity) in September 2021. \par  No Taxol was given due to underlying Neuropathy.\par \par She tolerated the treatments well without any significant complications.\par Her repeat echo post chemo on 9/23/21 shows EF 55 -60% and pt does not have any c/o CP, SOB or LE edema. \par Follow with Cardiology as scheduled. \par \par Will repeat blood work- CBC, CMP, CEA and CA 15.3 (these tumor markers as baseline for future comparison).\par \par All questions answered.\par \par She will call us next week for results\par Patient was seen and examined and discussed with DR. Ramos who agreed with the above. \par

## 2021-10-08 NOTE — REVIEW OF SYSTEMS
[Fatigue] : fatigue [Joint Pain] : joint pain [Joint Stiffness] : joint stiffness [Negative] : Heme/Lymph [Constipation] : no constipation [FreeTextEntry9] : Mostly knees. [de-identified] : Peripheral neuropathy not new

## 2021-10-08 NOTE — HISTORY OF PRESENT ILLNESS
[Disease: _____________________] : Disease: [unfilled] [AJCC Stage: ____] : AJCC Stage: [unfilled] [de-identified] : The patient is coming for her regularly scheduled follow up for her left breast carcinoma. She had Stage 1b, triple negative breast cancer, S/P left mastectomy and she completed 6 cycles of adjuvant chemotherapy with AC and Zinecard to prevent cardiac toxicity (since she had received Adriamycin in the past for her previous breast carcinoma).  No Taxol was given due to underlying neuropathy. The patient has tolerated the treatments well without any significant complications.\par \par Patient does not endorse any new complaints. She has recovered well from chemotherapy related complaints. \par Her repeat echo post chemo on 9/23/21 shows EF 55-60% and pt does not have any c/o CP, SOB or LE edema. \par She is scheduled to see her cardiologist  next week. \par \par \par  [de-identified] : Poorly differentiated.

## 2021-11-04 ENCOUNTER — APPOINTMENT (OUTPATIENT)
Dept: INFUSION THERAPY | Facility: CLINIC | Age: 76
End: 2021-11-04

## 2021-12-02 ENCOUNTER — APPOINTMENT (OUTPATIENT)
Dept: INFUSION THERAPY | Facility: CLINIC | Age: 76
End: 2021-12-02

## 2022-01-10 ENCOUNTER — LABORATORY RESULT (OUTPATIENT)
Age: 77
End: 2022-01-10

## 2022-01-10 ENCOUNTER — APPOINTMENT (OUTPATIENT)
Dept: INFUSION THERAPY | Facility: CLINIC | Age: 77
End: 2022-01-10
Payer: MEDICARE

## 2022-01-10 ENCOUNTER — APPOINTMENT (OUTPATIENT)
Dept: HEMATOLOGY ONCOLOGY | Facility: CLINIC | Age: 77
End: 2022-01-10
Payer: MEDICARE

## 2022-01-10 VITALS
HEART RATE: 78 BPM | WEIGHT: 176 LBS | SYSTOLIC BLOOD PRESSURE: 152 MMHG | DIASTOLIC BLOOD PRESSURE: 87 MMHG | HEIGHT: 64 IN | TEMPERATURE: 97.6 F | BODY MASS INDEX: 30.05 KG/M2 | RESPIRATION RATE: 14 BRPM

## 2022-01-10 LAB
ALBUMIN SERPL ELPH-MCNC: 4.4 G/DL
ALP BLD-CCNC: 81 U/L
ALT SERPL-CCNC: 11 U/L
ANION GAP SERPL CALC-SCNC: 18 MMOL/L
AST SERPL-CCNC: 13 U/L
BILIRUB SERPL-MCNC: 0.3 MG/DL
BUN SERPL-MCNC: 15 MG/DL
CALCIUM SERPL-MCNC: 9.9 MG/DL
CHLORIDE SERPL-SCNC: 105 MMOL/L
CO2 SERPL-SCNC: 21 MMOL/L
CREAT SERPL-MCNC: 0.8 MG/DL
GLUCOSE SERPL-MCNC: 105 MG/DL
HCT VFR BLD CALC: 40.8 %
HGB BLD-MCNC: 13.8 G/DL
MCHC RBC-ENTMCNC: 30.4 PG
MCHC RBC-ENTMCNC: 33.8 G/DL
MCV RBC AUTO: 89.9 FL
PLATELET # BLD AUTO: 157 K/UL
PMV BLD: 11.3 FL
POTASSIUM SERPL-SCNC: 5 MMOL/L
PROT SERPL-MCNC: 7.1 G/DL
RBC # BLD: 4.54 M/UL
RBC # FLD: 12.9 %
SODIUM SERPL-SCNC: 144 MMOL/L
WBC # FLD AUTO: 6.96 K/UL

## 2022-01-10 PROCEDURE — 99213 OFFICE O/P EST LOW 20 MIN: CPT

## 2022-01-10 NOTE — ASSESSMENT
[FreeTextEntry1] : 1. Stage IB, triple negative, poorly differentiated carcinoma of the left breast, S/P mastectomy followed by 6 cycles of AC + Zinecard (given her previous history of exposure to doxorubicin), all completed in September of last year. Presently, no evidence of relapse.\par 2. Previous history of triple negative breast cancer also involving the right breast (1991), treated with surgery, chemotherapy and RT to the chest wall.\par \par We will get CBC, CMP. If they're all within acceptable limits, the patient will be seen again in 4 months for follow up and as needed.\par \par All questions answered.

## 2022-01-10 NOTE — REVIEW OF SYSTEMS
[Joint Pain] : joint pain [Joint Stiffness] : joint stiffness [Negative] : Heme/Lymph [FreeTextEntry9] : Just the legs and now the 4th left finger ("locking"). [de-identified] : Numbness and tingling in lower extremities especially at night.

## 2022-01-10 NOTE — HISTORY OF PRESENT ILLNESS
[Disease: _____________________] : Disease: [unfilled] [de-identified] : The patient is coming for her regularly scheduled follow up for her history of recent breast carcinoma, stage 1B, triple negative..\par She has completed 6 cycles of AC with Zinecard back in September. Taxol or Taxotere was withheld because of her persistent peripheral neuropathy.\par At present, she is feeling well. She has no new complaints (still has the peripheral neuropathy).\par No problems with urine or bowel movements.\par The last colonoscopy was in 2020 and she was found to have 4 polyps. She will have another colonoscopy next year. \par Her last Pap smear was 2 years ago and it was negative. \par  [de-identified] : Invasive

## 2022-01-10 NOTE — PHYSICAL EXAM
[Fully active, able to carry on all pre-disease performance without restriction] : Status 0 - Fully active, able to carry on all pre-disease performance without restriction [Normal] : affect appropriate [de-identified] : But somewhat overweight. [de-identified] : S/P bilateral mastectomy. No evidence of local recurrence. [de-identified] : Arthritic changes noted.

## 2022-02-07 ENCOUNTER — APPOINTMENT (OUTPATIENT)
Dept: INFUSION THERAPY | Facility: CLINIC | Age: 77
End: 2022-02-07

## 2022-02-09 NOTE — PROCEDURE NOTE - PROCEDURE FINDINGS AND DETAILS
Successful placement of a right IJ chemoport. Catheter is heparinized and ready for immediate use. [Waldemar: ____] : Waldemar [unfilled] [Waldemar: _____] : Waldemar [unfilled] [Alert] : alert [No Acute Distress] : no acute distress [Normocephalic] : normocephalic [Conjunctivae with no discharge] : conjunctivae with no discharge [PERRL] : PERRL [EOMI Bilateral] : EOMI bilateral [Auricles Well Formed] : auricles well formed [Clear Tympanic membranes with present light reflex and bony landmarks] : clear tympanic membranes with present light reflex and bony landmarks [No Discharge] : no discharge [Nares Patent] : nares patent [Pink Nasal Mucosa] : pink nasal mucosa [Palate Intact] : palate intact [Nonerythematous Oropharynx] : nonerythematous oropharynx [Supple, full passive range of motion] : supple, full passive range of motion [No Palpable Masses] : no palpable masses [Symmetric Chest Rise] : symmetric chest rise [Clear to Auscultation Bilaterally] : clear to auscultation bilaterally [Regular Rate and Rhythm] : regular rate and rhythm [Normal S1, S2 present] : normal S1, S2 present [No Murmurs] : no murmurs [+2 Femoral Pulses] : +2 femoral pulses [Soft] : soft [NonTender] : non tender [Non Distended] : non distended [Normoactive Bowel Sounds] : normoactive bowel sounds [No Hepatomegaly] : no hepatomegaly [No Splenomegaly] : no splenomegaly [Patent] : patent [No fissures] : no fissures [No Abnormal Lymph Nodes Palpated] : no abnormal lymph nodes palpated [No Gait Asymmetry] : no gait asymmetry [No pain or deformities with palpation of bone, muscles, joints] : no pain or deformities with palpation of bone, muscles, joints [Normal Muscle Tone] : normal muscle tone [Straight] : straight [No Rash or Lesions] : no rash or lesions

## 2022-03-07 ENCOUNTER — APPOINTMENT (OUTPATIENT)
Dept: INFUSION THERAPY | Facility: CLINIC | Age: 77
End: 2022-03-07

## 2022-03-14 ENCOUNTER — OUTPATIENT (OUTPATIENT)
Dept: OUTPATIENT SERVICES | Facility: HOSPITAL | Age: 77
LOS: 1 days | Discharge: HOME | End: 2022-03-14

## 2022-03-14 ENCOUNTER — APPOINTMENT (OUTPATIENT)
Dept: INFUSION THERAPY | Facility: CLINIC | Age: 77
End: 2022-03-14

## 2022-03-14 DIAGNOSIS — Z90.12 ACQUIRED ABSENCE OF LEFT BREAST AND NIPPLE: Chronic | ICD-10-CM

## 2022-03-14 DIAGNOSIS — Z90.11 ACQUIRED ABSENCE OF RIGHT BREAST AND NIPPLE: Chronic | ICD-10-CM

## 2022-03-14 DIAGNOSIS — Z98.890 OTHER SPECIFIED POSTPROCEDURAL STATES: Chronic | ICD-10-CM

## 2022-03-14 DIAGNOSIS — C50.919 MALIGNANT NEOPLASM OF UNSPECIFIED SITE OF UNSPECIFIED FEMALE BREAST: ICD-10-CM

## 2022-04-04 ENCOUNTER — APPOINTMENT (OUTPATIENT)
Dept: INFUSION THERAPY | Facility: CLINIC | Age: 77
End: 2022-04-04

## 2022-05-02 ENCOUNTER — LABORATORY RESULT (OUTPATIENT)
Age: 77
End: 2022-05-02

## 2022-05-02 ENCOUNTER — APPOINTMENT (OUTPATIENT)
Dept: HEMATOLOGY ONCOLOGY | Facility: CLINIC | Age: 77
End: 2022-05-02
Payer: MEDICARE

## 2022-05-02 ENCOUNTER — APPOINTMENT (OUTPATIENT)
Dept: INFUSION THERAPY | Facility: CLINIC | Age: 77
End: 2022-05-02
Payer: MEDICARE

## 2022-05-02 VITALS
HEIGHT: 64 IN | WEIGHT: 176 LBS | BODY MASS INDEX: 30.05 KG/M2 | DIASTOLIC BLOOD PRESSURE: 63 MMHG | HEART RATE: 78 BPM | TEMPERATURE: 96.8 F | SYSTOLIC BLOOD PRESSURE: 143 MMHG | RESPIRATION RATE: 14 BRPM

## 2022-05-02 LAB
ALBUMIN SERPL ELPH-MCNC: 4.1 G/DL
ALP BLD-CCNC: 67 U/L
ALT SERPL-CCNC: 9 U/L
ANION GAP SERPL CALC-SCNC: 16 MMOL/L
AST SERPL-CCNC: 12 U/L
BILIRUB SERPL-MCNC: 0.4 MG/DL
BUN SERPL-MCNC: 14 MG/DL
CALCIUM SERPL-MCNC: 9.2 MG/DL
CHLORIDE SERPL-SCNC: 104 MMOL/L
CO2 SERPL-SCNC: 21 MMOL/L
CREAT SERPL-MCNC: 0.7 MG/DL
EGFR: 90 ML/MIN/1.73M2
GLUCOSE SERPL-MCNC: 108 MG/DL
HCT VFR BLD CALC: 39.6 %
HGB BLD-MCNC: 13.9 G/DL
MCHC RBC-ENTMCNC: 31.2 PG
MCHC RBC-ENTMCNC: 35.1 G/DL
MCV RBC AUTO: 89 FL
PLATELET # BLD AUTO: 148 K/UL
PMV BLD: 10.9 FL
POTASSIUM SERPL-SCNC: 4.5 MMOL/L
PROT SERPL-MCNC: 6.7 G/DL
RBC # BLD: 4.45 M/UL
RBC # FLD: 13.3 %
SODIUM SERPL-SCNC: 141 MMOL/L
WBC # FLD AUTO: 6.79 K/UL

## 2022-05-02 PROCEDURE — 99213 OFFICE O/P EST LOW 20 MIN: CPT

## 2022-05-02 NOTE — HISTORY OF PRESENT ILLNESS
[Disease: _____________________] : Disease: [unfilled] [AJCC Stage: ____] : AJCC Stage: [unfilled] [de-identified] : The patient is coming for her regularly scheduled follow up for her breast carcinomas, the most recent one on the left (stage IB), the previous one in 1990, S/P bilateral mastectomy followed by adjuvant chemotherapy with AC x 6, given with Zinecard. The most recent tumor was triple negative. Taxol was not administered because of peripheral neuropathy.\par At present, she is denuing any new problems.\par Feeling well.\par No new medications. \par She is up to date with colonoscopy and Pap smear. [de-identified] : Poorly differentiated [de-identified] : Triple negative

## 2022-05-02 NOTE — ASSESSMENT
[FreeTextEntry1] : 1. 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 History of right breast carcinoma, also triple negative, diagnosed and treated with chemotherapy back in 1990, clinically no evidence of recurrence.\par \par The situation was discussed with the patient.\par Will obtain CBC, CMP.\par Her Port-a-Cath will be flushed.\par \par Further recommendations, as needed, after the above results are available.\par \par All questions answered.\par \par F/U in 4 months and as needed.

## 2022-05-31 ENCOUNTER — APPOINTMENT (OUTPATIENT)
Dept: HEMATOLOGY ONCOLOGY | Facility: CLINIC | Age: 77
End: 2022-05-31

## 2022-06-06 ENCOUNTER — APPOINTMENT (OUTPATIENT)
Dept: INFUSION THERAPY | Facility: CLINIC | Age: 77
End: 2022-06-06

## 2022-06-27 ENCOUNTER — APPOINTMENT (OUTPATIENT)
Dept: INFUSION THERAPY | Facility: CLINIC | Age: 77
End: 2022-06-27

## 2022-07-25 ENCOUNTER — OUTPATIENT (OUTPATIENT)
Dept: OUTPATIENT SERVICES | Facility: HOSPITAL | Age: 77
LOS: 1 days | Discharge: HOME | End: 2022-07-25

## 2022-07-25 ENCOUNTER — APPOINTMENT (OUTPATIENT)
Dept: INFUSION THERAPY | Facility: CLINIC | Age: 77
End: 2022-07-25

## 2022-07-25 DIAGNOSIS — Z98.890 OTHER SPECIFIED POSTPROCEDURAL STATES: Chronic | ICD-10-CM

## 2022-07-25 DIAGNOSIS — C50.919 MALIGNANT NEOPLASM OF UNSPECIFIED SITE OF UNSPECIFIED FEMALE BREAST: ICD-10-CM

## 2022-07-25 DIAGNOSIS — Z90.11 ACQUIRED ABSENCE OF RIGHT BREAST AND NIPPLE: Chronic | ICD-10-CM

## 2022-07-25 DIAGNOSIS — Z90.12 ACQUIRED ABSENCE OF LEFT BREAST AND NIPPLE: Chronic | ICD-10-CM

## 2022-08-22 ENCOUNTER — LABORATORY RESULT (OUTPATIENT)
Age: 77
End: 2022-08-22

## 2022-08-22 ENCOUNTER — APPOINTMENT (OUTPATIENT)
Dept: HEMATOLOGY ONCOLOGY | Facility: CLINIC | Age: 77
End: 2022-08-22

## 2022-08-22 ENCOUNTER — APPOINTMENT (OUTPATIENT)
Dept: INFUSION THERAPY | Facility: CLINIC | Age: 77
End: 2022-08-22

## 2022-08-22 VITALS
HEIGHT: 64 IN | HEART RATE: 72 BPM | BODY MASS INDEX: 30.56 KG/M2 | SYSTOLIC BLOOD PRESSURE: 155 MMHG | TEMPERATURE: 97.9 F | WEIGHT: 179 LBS | RESPIRATION RATE: 14 BRPM | DIASTOLIC BLOOD PRESSURE: 97 MMHG

## 2022-08-22 LAB
ALBUMIN SERPL ELPH-MCNC: 3.9 G/DL
ALP BLD-CCNC: 60 U/L
ALT SERPL-CCNC: 11 U/L
ANION GAP SERPL CALC-SCNC: 11 MMOL/L
AST SERPL-CCNC: 12 U/L
BILIRUB SERPL-MCNC: 0.3 MG/DL
BUN SERPL-MCNC: 15 MG/DL
CALCIUM SERPL-MCNC: 8.5 MG/DL
CHLORIDE SERPL-SCNC: 106 MMOL/L
CO2 SERPL-SCNC: 23 MMOL/L
CREAT SERPL-MCNC: 0.7 MG/DL
EGFR: 90 ML/MIN/1.73M2
GLUCOSE SERPL-MCNC: 99 MG/DL
HCT VFR BLD CALC: 37.5 %
HGB BLD-MCNC: 12.7 G/DL
MCHC RBC-ENTMCNC: 30.5 PG
MCHC RBC-ENTMCNC: 33.9 G/DL
MCV RBC AUTO: 89.9 FL
PLATELET # BLD AUTO: 146 K/UL
PMV BLD: 11.8 FL
POTASSIUM SERPL-SCNC: 4.4 MMOL/L
PROT SERPL-MCNC: 6.3 G/DL
RBC # BLD: 4.17 M/UL
RBC # FLD: 13.1 %
SODIUM SERPL-SCNC: 140 MMOL/L
WBC # FLD AUTO: 5.5 K/UL

## 2022-08-22 PROCEDURE — 99213 OFFICE O/P EST LOW 20 MIN: CPT

## 2022-08-22 NOTE — ASSESSMENT
[FreeTextEntry1] : 1. Left breast carcinoma, stage IB, triple negative, S/P chemotherapy with AC x 6 administered with Zinecard (since she previously had Adriamycin and taxane not administered because of peripheral neuropathy), presently  no evidence of recurrence.\par 2. Right breast cancer, also triple negative, diagnosed and treated in 1990.\par \par The situation was discussed with the patient.\par We will obtain CBC, CMP. If they're within acceptable limits, she will be seen again in 4 months for follow up.\par She is up to date with her screenings.\par \par All questions answered.

## 2022-08-22 NOTE — REVIEW OF SYSTEMS
[Recent Change In Weight] : ~T recent weight change [Joint Pain] : joint pain [Joint Stiffness] : joint stiffness [Negative] : Genitourinary [FreeTextEntry2] : Gained a few lbs [FreeTextEntry9] : Mostly knees [de-identified] : Still with the numbness at the fingertips and toes

## 2022-08-22 NOTE — PHYSICAL EXAM
[Fully active, able to carry on all pre-disease performance without restriction] : Status 0 - Fully active, able to carry on all pre-disease performance without restriction [Normal] : affect appropriate [de-identified] : But somewhat overweight [de-identified] : Just same varicosities of the lower extremities [de-identified] : S/P bilateral mastectomy, no evidence of local recurrence. [de-identified] : Some arthritic changes [de-identified] : Multiple seborrheic keratotic lesions

## 2022-08-22 NOTE — HISTORY OF PRESENT ILLNESS
[Disease: _____________________] : Disease: [unfilled] [AJCC Stage: ____] : AJCC Stage: [unfilled] [de-identified] : The patient is coming for follow up for her breast carcinomas, the most recent one diagnosed in January of 2021 on the left (the previous one was in 1990 on the right), stage IB, S/P bilateral mastectomy, triple negative (both), completed adjuvant chemotherapy in September of 2021.\par At present, she is denying any new particular problems.\par No new medications. \par Last colonoscopy was 2 years ago (4 polyps were removed) and she is scheduled for a repeat one next year. [de-identified] : Invasive ductal  [de-identified] : Triple negative

## 2022-09-19 ENCOUNTER — APPOINTMENT (OUTPATIENT)
Dept: INFUSION THERAPY | Facility: CLINIC | Age: 77
End: 2022-09-19

## 2022-10-17 ENCOUNTER — APPOINTMENT (OUTPATIENT)
Dept: INFUSION THERAPY | Facility: CLINIC | Age: 77
End: 2022-10-17

## 2022-10-18 NOTE — ASU PREOP CHECKLIST - INTERNAL PROSTHESES
Capacity/Event Note Event Note Nutrition Services MAR Accept Note/Event Note MICU/Transfer Note POCUS/Event Note Vascular Surgery Vascular Surgery Vascular Surgery no

## 2022-11-21 ENCOUNTER — APPOINTMENT (OUTPATIENT)
Dept: INFUSION THERAPY | Facility: CLINIC | Age: 77
End: 2022-11-21

## 2022-11-21 ENCOUNTER — OUTPATIENT (OUTPATIENT)
Dept: OUTPATIENT SERVICES | Facility: HOSPITAL | Age: 77
LOS: 1 days | Discharge: HOME | End: 2022-11-21

## 2022-11-21 DIAGNOSIS — Z98.890 OTHER SPECIFIED POSTPROCEDURAL STATES: Chronic | ICD-10-CM

## 2022-11-21 DIAGNOSIS — C50.919 MALIGNANT NEOPLASM OF UNSPECIFIED SITE OF UNSPECIFIED FEMALE BREAST: ICD-10-CM

## 2022-11-21 DIAGNOSIS — Z90.12 ACQUIRED ABSENCE OF LEFT BREAST AND NIPPLE: Chronic | ICD-10-CM

## 2022-11-21 DIAGNOSIS — Z90.11 ACQUIRED ABSENCE OF RIGHT BREAST AND NIPPLE: Chronic | ICD-10-CM

## 2022-12-19 ENCOUNTER — APPOINTMENT (OUTPATIENT)
Dept: HEMATOLOGY ONCOLOGY | Facility: CLINIC | Age: 77
End: 2022-12-19

## 2022-12-19 ENCOUNTER — APPOINTMENT (OUTPATIENT)
Dept: INFUSION THERAPY | Facility: CLINIC | Age: 77
End: 2022-12-19

## 2022-12-19 VITALS
RESPIRATION RATE: 16 BRPM | BODY MASS INDEX: 29.37 KG/M2 | SYSTOLIC BLOOD PRESSURE: 135 MMHG | WEIGHT: 172 LBS | TEMPERATURE: 98.2 F | HEIGHT: 64 IN | HEART RATE: 77 BPM | DIASTOLIC BLOOD PRESSURE: 62 MMHG

## 2022-12-19 LAB
ALBUMIN SERPL ELPH-MCNC: 4.5 G/DL
ALP BLD-CCNC: 69 U/L
ALT SERPL-CCNC: 11 U/L
ANION GAP SERPL CALC-SCNC: 13 MMOL/L
AST SERPL-CCNC: 12 U/L
BASOPHILS # BLD AUTO: 0.05 K/UL
BASOPHILS NFR BLD AUTO: 0.7 %
BILIRUB SERPL-MCNC: 0.3 MG/DL
BUN SERPL-MCNC: 16 MG/DL
CALCIUM SERPL-MCNC: 8.9 MG/DL
CHLORIDE SERPL-SCNC: 103 MMOL/L
CO2 SERPL-SCNC: 24 MMOL/L
CREAT SERPL-MCNC: 0.7 MG/DL
EGFR: 90 ML/MIN/1.73M2
EOSINOPHIL # BLD AUTO: 0.12 K/UL
EOSINOPHIL NFR BLD AUTO: 1.8 %
GLUCOSE SERPL-MCNC: 95 MG/DL
HCT VFR BLD CALC: 39.7 %
HGB BLD-MCNC: 13.7 G/DL
IMM GRANULOCYTES NFR BLD AUTO: 0.1 %
LYMPHOCYTES # BLD AUTO: 1.97 K/UL
LYMPHOCYTES NFR BLD AUTO: 29 %
MAN DIFF?: NORMAL
MCHC RBC-ENTMCNC: 31.4 PG
MCHC RBC-ENTMCNC: 34.5 G/DL
MCV RBC AUTO: 90.8 FL
MONOCYTES # BLD AUTO: 0.68 K/UL
MONOCYTES NFR BLD AUTO: 10 %
NEUTROPHILS # BLD AUTO: 3.96 K/UL
NEUTROPHILS NFR BLD AUTO: 58.4 %
PLATELET # BLD AUTO: 178 K/UL
POTASSIUM SERPL-SCNC: 4.5 MMOL/L
PROT SERPL-MCNC: 6.6 G/DL
RBC # BLD: 4.37 M/UL
RBC # FLD: 12.7 %
SODIUM SERPL-SCNC: 140 MMOL/L
WBC # FLD AUTO: 6.79 K/UL

## 2022-12-19 PROCEDURE — 99213 OFFICE O/P EST LOW 20 MIN: CPT

## 2022-12-19 NOTE — REVIEW OF SYSTEMS
[Recent Change In Weight] : ~T recent weight change [Loss of Hearing] : loss of hearing [Joint Pain] : joint pain [Joint Stiffness] : joint stiffness [Negative] : Heme/Lymph [FreeTextEntry2] : Went on Weight Watchers and lost almost 8 lbs [FreeTextEntry4] : Wax in left ear [FreeTextEntry9] : Shoulders and knees, every once in a while (not too bad)

## 2022-12-19 NOTE — ASSESSMENT
[FreeTextEntry1] : 1. Left breast invasive cancer, stage Ib, ER/SD negative, S/P mastectomy followed by adjuvant chemotherapy x 6 cycles with AC and Zinecard, presently no evidence of disease.\par 2. History of right breast Ca, also ER/SD negative, S/P mastectomy and adjuvant chemotherapy.\par \par The situation was discussed with the patient.\par She is up to date with her screenings.\par Will obtain CBC and CMP and will flush the Port.\par \par Further recommendations, as needed, after the above results are available.\par \par All questions answered.

## 2022-12-19 NOTE — PHYSICAL EXAM
[Fully active, able to carry on all pre-disease performance without restriction] : Status 0 - Fully active, able to carry on all pre-disease performance without restriction [Normal] : affect appropriate [de-identified] : S/P bilateral mastectomy, no evidence of local recurrence [de-identified] : Some arthritic changes

## 2022-12-19 NOTE — HISTORY OF PRESENT ILLNESS
[Disease: _____________________] : Disease: [unfilled] [AJCC Stage: ____] : AJCC Stage: [unfilled] [de-identified] : The patient is coming for her regularly scheduled follow up for her relatively recent let breast carcinoma, stage Ib, triple negative, S/P 3 cycles of AC with Zinecard now almost 2 years since the initial diagnosis. \par The tumor was triple negative. She had a previous cancer in the right breast, also triple negative, for which she was treated with chemotherapy with AC.\par She is on no new medications.\par Her present complaints are related to a ganglion cyst (most likely) at the left wrist, in addition to hair loss. \par Her medications have not been changed. [de-identified] : Invasive ductal

## 2023-01-06 NOTE — H&P PST ADULT - TEACHING/LEARNING LEARNING PREFERENCES
PRE-OP DIAGNOSIS:  Lateral meniscus tear 06-Jan-2023 08:50:41  Jung Hayes  Medial meniscus tear 06-Jan-2023 08:50:16  Jung Hayes   verbal instruction/written material

## 2023-01-16 ENCOUNTER — APPOINTMENT (OUTPATIENT)
Dept: INFUSION THERAPY | Facility: CLINIC | Age: 78
End: 2023-01-16

## 2023-01-16 ENCOUNTER — OUTPATIENT (OUTPATIENT)
Dept: OUTPATIENT SERVICES | Facility: HOSPITAL | Age: 78
LOS: 1 days | End: 2023-01-16

## 2023-01-16 DIAGNOSIS — C50.919 MALIGNANT NEOPLASM OF UNSPECIFIED SITE OF UNSPECIFIED FEMALE BREAST: ICD-10-CM

## 2023-01-16 DIAGNOSIS — Z98.890 OTHER SPECIFIED POSTPROCEDURAL STATES: Chronic | ICD-10-CM

## 2023-01-16 DIAGNOSIS — Z90.12 ACQUIRED ABSENCE OF LEFT BREAST AND NIPPLE: Chronic | ICD-10-CM

## 2023-01-16 DIAGNOSIS — Z90.11 ACQUIRED ABSENCE OF RIGHT BREAST AND NIPPLE: Chronic | ICD-10-CM

## 2023-02-13 ENCOUNTER — APPOINTMENT (OUTPATIENT)
Dept: INFUSION THERAPY | Facility: CLINIC | Age: 78
End: 2023-02-13

## 2023-02-13 ENCOUNTER — OUTPATIENT (OUTPATIENT)
Dept: OUTPATIENT SERVICES | Facility: HOSPITAL | Age: 78
LOS: 1 days | End: 2023-02-13
Payer: MEDICARE

## 2023-02-13 DIAGNOSIS — Z90.12 ACQUIRED ABSENCE OF LEFT BREAST AND NIPPLE: Chronic | ICD-10-CM

## 2023-02-13 DIAGNOSIS — Z90.11 ACQUIRED ABSENCE OF RIGHT BREAST AND NIPPLE: Chronic | ICD-10-CM

## 2023-02-13 DIAGNOSIS — C50.919 MALIGNANT NEOPLASM OF UNSPECIFIED SITE OF UNSPECIFIED FEMALE BREAST: ICD-10-CM

## 2023-02-13 DIAGNOSIS — Z98.890 OTHER SPECIFIED POSTPROCEDURAL STATES: Chronic | ICD-10-CM

## 2023-02-13 PROCEDURE — 96523 IRRIG DRUG DELIVERY DEVICE: CPT

## 2023-02-14 DIAGNOSIS — C50.919 MALIGNANT NEOPLASM OF UNSPECIFIED SITE OF UNSPECIFIED FEMALE BREAST: ICD-10-CM

## 2023-03-06 ENCOUNTER — OUTPATIENT (OUTPATIENT)
Dept: OUTPATIENT SERVICES | Facility: HOSPITAL | Age: 78
LOS: 1 days | End: 2023-03-06
Payer: MEDICARE

## 2023-03-06 ENCOUNTER — APPOINTMENT (OUTPATIENT)
Dept: INFUSION THERAPY | Facility: CLINIC | Age: 78
End: 2023-03-06

## 2023-03-06 DIAGNOSIS — Z98.890 OTHER SPECIFIED POSTPROCEDURAL STATES: Chronic | ICD-10-CM

## 2023-03-06 DIAGNOSIS — Z90.11 ACQUIRED ABSENCE OF RIGHT BREAST AND NIPPLE: Chronic | ICD-10-CM

## 2023-03-06 DIAGNOSIS — Z90.12 ACQUIRED ABSENCE OF LEFT BREAST AND NIPPLE: Chronic | ICD-10-CM

## 2023-03-06 DIAGNOSIS — C50.919 MALIGNANT NEOPLASM OF UNSPECIFIED SITE OF UNSPECIFIED FEMALE BREAST: ICD-10-CM

## 2023-03-06 PROCEDURE — 96523 IRRIG DRUG DELIVERY DEVICE: CPT

## 2023-03-07 DIAGNOSIS — C50.919 MALIGNANT NEOPLASM OF UNSPECIFIED SITE OF UNSPECIFIED FEMALE BREAST: ICD-10-CM

## 2023-04-03 ENCOUNTER — OUTPATIENT (OUTPATIENT)
Dept: OUTPATIENT SERVICES | Facility: HOSPITAL | Age: 78
LOS: 1 days | End: 2023-04-03
Payer: MEDICARE

## 2023-04-03 ENCOUNTER — APPOINTMENT (OUTPATIENT)
Dept: INFUSION THERAPY | Facility: CLINIC | Age: 78
End: 2023-04-03

## 2023-04-03 DIAGNOSIS — Z98.890 OTHER SPECIFIED POSTPROCEDURAL STATES: Chronic | ICD-10-CM

## 2023-04-03 DIAGNOSIS — C50.919 MALIGNANT NEOPLASM OF UNSPECIFIED SITE OF UNSPECIFIED FEMALE BREAST: ICD-10-CM

## 2023-04-03 DIAGNOSIS — Z90.12 ACQUIRED ABSENCE OF LEFT BREAST AND NIPPLE: Chronic | ICD-10-CM

## 2023-04-03 DIAGNOSIS — Z90.11 ACQUIRED ABSENCE OF RIGHT BREAST AND NIPPLE: Chronic | ICD-10-CM

## 2023-04-03 PROCEDURE — 96523 IRRIG DRUG DELIVERY DEVICE: CPT

## 2023-05-08 ENCOUNTER — OUTPATIENT (OUTPATIENT)
Dept: OUTPATIENT SERVICES | Facility: HOSPITAL | Age: 78
LOS: 1 days | End: 2023-05-08
Payer: MEDICARE

## 2023-05-08 ENCOUNTER — APPOINTMENT (OUTPATIENT)
Age: 78
End: 2023-05-08
Payer: MEDICARE

## 2023-05-08 ENCOUNTER — APPOINTMENT (OUTPATIENT)
Dept: INFUSION THERAPY | Facility: CLINIC | Age: 78
End: 2023-05-08

## 2023-05-08 ENCOUNTER — APPOINTMENT (OUTPATIENT)
Dept: HEMATOLOGY ONCOLOGY | Facility: CLINIC | Age: 78
End: 2023-05-08

## 2023-05-08 ENCOUNTER — APPOINTMENT (OUTPATIENT)
Dept: HEMATOLOGY ONCOLOGY | Facility: CLINIC | Age: 78
End: 2023-05-08
Payer: MEDICARE

## 2023-05-08 ENCOUNTER — LABORATORY RESULT (OUTPATIENT)
Age: 78
End: 2023-05-08

## 2023-05-08 VITALS
TEMPERATURE: 97.3 F | RESPIRATION RATE: 16 BRPM | SYSTOLIC BLOOD PRESSURE: 135 MMHG | DIASTOLIC BLOOD PRESSURE: 64 MMHG | BODY MASS INDEX: 28.68 KG/M2 | WEIGHT: 168 LBS | HEIGHT: 64 IN | HEART RATE: 80 BPM

## 2023-05-08 DIAGNOSIS — C50.919 MALIGNANT NEOPLASM OF UNSPECIFIED SITE OF UNSPECIFIED FEMALE BREAST: ICD-10-CM

## 2023-05-08 DIAGNOSIS — Z90.11 ACQUIRED ABSENCE OF RIGHT BREAST AND NIPPLE: Chronic | ICD-10-CM

## 2023-05-08 DIAGNOSIS — Z98.890 OTHER SPECIFIED POSTPROCEDURAL STATES: Chronic | ICD-10-CM

## 2023-05-08 DIAGNOSIS — Z90.12 ACQUIRED ABSENCE OF LEFT BREAST AND NIPPLE: Chronic | ICD-10-CM

## 2023-05-08 LAB
ALBUMIN SERPL ELPH-MCNC: 4.1 G/DL
ALP BLD-CCNC: 70 U/L
ALT SERPL-CCNC: 10 U/L
ANION GAP SERPL CALC-SCNC: 12 MMOL/L
AST SERPL-CCNC: 12 U/L
BILIRUB SERPL-MCNC: 0.5 MG/DL
BUN SERPL-MCNC: 17 MG/DL
CALCIUM SERPL-MCNC: 8.8 MG/DL
CHLORIDE SERPL-SCNC: 103 MMOL/L
CO2 SERPL-SCNC: 21 MMOL/L
CREAT SERPL-MCNC: 0.7 MG/DL
EGFR: 89 ML/MIN/1.73M2
GLUCOSE SERPL-MCNC: 107 MG/DL
HCT VFR BLD CALC: 39.4 %
HGB BLD-MCNC: 13.2 G/DL
MCHC RBC-ENTMCNC: 29.9 PG
MCHC RBC-ENTMCNC: 33.5 G/DL
MCV RBC AUTO: 89.1 FL
PLATELET # BLD AUTO: 164 K/UL
PMV BLD: NORMAL
POTASSIUM SERPL-SCNC: 4.4 MMOL/L
PROT SERPL-MCNC: 6.5 G/DL
RBC # BLD: 4.42 M/UL
RBC # FLD: 12.8 %
SODIUM SERPL-SCNC: 136 MMOL/L
WBC # FLD AUTO: 7 K/UL

## 2023-05-08 PROCEDURE — 36415 COLL VENOUS BLD VENIPUNCTURE: CPT

## 2023-05-08 PROCEDURE — 99212 OFFICE O/P EST SF 10 MIN: CPT

## 2023-05-08 PROCEDURE — 80053 COMPREHEN METABOLIC PANEL: CPT

## 2023-05-08 PROCEDURE — 85027 COMPLETE CBC AUTOMATED: CPT

## 2023-05-08 PROCEDURE — 99213 OFFICE O/P EST LOW 20 MIN: CPT

## 2023-05-08 NOTE — ASSESSMENT
[FreeTextEntry1] : 1. Left breast invasive cancer, stage 1B, ER/NC negative, S/P mastectomy followed by adjuvant chemotherapy x 6 cycles with AC and Zinecard, presently no evidence of disease.\par 2. History of right breast Ca, also ER/NC negative, S/P mastectomy and adjuvant chemotherapy, more than 30 years ago.\par \par The situation was discussed with the patient.\par She is up to date with her screenings.\par Will obtain CBC and CMP and will flush the Port.\par \par Further recommendations, as needed, after the above results are available.\par She will follow up in 5 months.\par \par All questions answered.\par \par Case discussed and patient seen with Dr. Ramos who agreed with the above..\par

## 2023-05-08 NOTE — REVIEW OF SYSTEMS
[Recent Change In Weight] : ~T recent weight change [Loss of Hearing] : loss of hearing [Joint Pain] : joint pain [Joint Stiffness] : joint stiffness [Negative] : Endocrine [FreeTextEntry2] : lost 4 pounds on weight watchers [FreeTextEntry3] : possible glaucoma [FreeTextEntry9] : knees

## 2023-05-08 NOTE — HISTORY OF PRESENT ILLNESS
[Disease: _____________________] : Disease: [unfilled] [AJCC Stage: ____] : AJCC Stage: [unfilled] [de-identified] : The patient is coming for her regularly scheduled follow up for her left breast carcinoma, stage 1B, triple negative, S/P 6 cycles of AC with Zinecard now almost 2.5 years since the initial diagnosis. \par She had a previous cancer in the right breast, almost 30 years ago, also triple negative, for which she was treated with chemotherapy with AC.\par She is on no new medications, just a new nasal spray for post nasal drip.\par She feels well, has no new complaints. [de-identified] : Invasive ductal

## 2023-05-08 NOTE — PHYSICAL EXAM
[Fully active, able to carry on all pre-disease performance without restriction] : Status 0 - Fully active, able to carry on all pre-disease performance without restriction [Normal] : affect appropriate [de-identified] : occasional extra systoles [de-identified] : S/P bilateral mastectomy, no evidence of local recurrence [de-identified] : Some arthritic changes

## 2023-05-23 ENCOUNTER — INPATIENT (INPATIENT)
Facility: HOSPITAL | Age: 78
LOS: 14 days | Discharge: NURSING, REHAB OR LONG-TERM CARE | DRG: 522 | End: 2023-06-07
Attending: HOSPITALIST | Admitting: HOSPITALIST
Payer: MEDICARE

## 2023-05-23 VITALS
WEIGHT: 169.98 LBS | DIASTOLIC BLOOD PRESSURE: 100 MMHG | HEART RATE: 80 BPM | HEIGHT: 64 IN | SYSTOLIC BLOOD PRESSURE: 160 MMHG | OXYGEN SATURATION: 97 %

## 2023-05-23 DIAGNOSIS — S72.009A FRACTURE OF UNSPECIFIED PART OF NECK OF UNSPECIFIED FEMUR, INITIAL ENCOUNTER FOR CLOSED FRACTURE: ICD-10-CM

## 2023-05-23 DIAGNOSIS — Z98.890 OTHER SPECIFIED POSTPROCEDURAL STATES: Chronic | ICD-10-CM

## 2023-05-23 DIAGNOSIS — Z90.11 ACQUIRED ABSENCE OF RIGHT BREAST AND NIPPLE: Chronic | ICD-10-CM

## 2023-05-23 DIAGNOSIS — Z90.12 ACQUIRED ABSENCE OF LEFT BREAST AND NIPPLE: Chronic | ICD-10-CM

## 2023-05-23 LAB
ALBUMIN SERPL ELPH-MCNC: 4.4 G/DL — SIGNIFICANT CHANGE UP (ref 3.5–5.2)
ALP SERPL-CCNC: 71 U/L — SIGNIFICANT CHANGE UP (ref 30–115)
ALT FLD-CCNC: 13 U/L — SIGNIFICANT CHANGE UP (ref 0–41)
ANION GAP SERPL CALC-SCNC: 14 MMOL/L — SIGNIFICANT CHANGE UP (ref 7–14)
APTT BLD: 27.3 SEC — SIGNIFICANT CHANGE UP (ref 27–39.2)
AST SERPL-CCNC: 17 U/L — SIGNIFICANT CHANGE UP (ref 0–41)
BASOPHILS # BLD AUTO: 0.03 K/UL — SIGNIFICANT CHANGE UP (ref 0–0.2)
BASOPHILS NFR BLD AUTO: 0.3 % — SIGNIFICANT CHANGE UP (ref 0–1)
BILIRUB SERPL-MCNC: 0.4 MG/DL — SIGNIFICANT CHANGE UP (ref 0.2–1.2)
BUN SERPL-MCNC: 17 MG/DL — SIGNIFICANT CHANGE UP (ref 10–20)
CALCIUM SERPL-MCNC: 9.4 MG/DL — SIGNIFICANT CHANGE UP (ref 8.4–10.5)
CHLORIDE SERPL-SCNC: 101 MMOL/L — SIGNIFICANT CHANGE UP (ref 98–110)
CO2 SERPL-SCNC: 22 MMOL/L — SIGNIFICANT CHANGE UP (ref 17–32)
CREAT SERPL-MCNC: 0.8 MG/DL — SIGNIFICANT CHANGE UP (ref 0.7–1.5)
EGFR: 76 ML/MIN/1.73M2 — SIGNIFICANT CHANGE UP
EOSINOPHIL # BLD AUTO: 0.08 K/UL — SIGNIFICANT CHANGE UP (ref 0–0.7)
EOSINOPHIL NFR BLD AUTO: 0.9 % — SIGNIFICANT CHANGE UP (ref 0–8)
GLUCOSE SERPL-MCNC: 113 MG/DL — HIGH (ref 70–99)
HCT VFR BLD CALC: 40.5 % — SIGNIFICANT CHANGE UP (ref 37–47)
HGB BLD-MCNC: 13.9 G/DL — SIGNIFICANT CHANGE UP (ref 12–16)
IMM GRANULOCYTES NFR BLD AUTO: 0.6 % — HIGH (ref 0.1–0.3)
INR BLD: 1.05 RATIO — SIGNIFICANT CHANGE UP (ref 0.65–1.3)
LYMPHOCYTES # BLD AUTO: 2.25 K/UL — SIGNIFICANT CHANGE UP (ref 1.2–3.4)
LYMPHOCYTES # BLD AUTO: 24.3 % — SIGNIFICANT CHANGE UP (ref 20.5–51.1)
MCHC RBC-ENTMCNC: 31 PG — SIGNIFICANT CHANGE UP (ref 27–31)
MCHC RBC-ENTMCNC: 34.3 G/DL — SIGNIFICANT CHANGE UP (ref 32–37)
MCV RBC AUTO: 90.2 FL — SIGNIFICANT CHANGE UP (ref 81–99)
MONOCYTES # BLD AUTO: 0.68 K/UL — HIGH (ref 0.1–0.6)
MONOCYTES NFR BLD AUTO: 7.3 % — SIGNIFICANT CHANGE UP (ref 1.7–9.3)
NEUTROPHILS # BLD AUTO: 6.17 K/UL — SIGNIFICANT CHANGE UP (ref 1.4–6.5)
NEUTROPHILS NFR BLD AUTO: 66.6 % — SIGNIFICANT CHANGE UP (ref 42.2–75.2)
NRBC # BLD: 0 /100 WBCS — SIGNIFICANT CHANGE UP (ref 0–0)
PLATELET # BLD AUTO: 160 K/UL — SIGNIFICANT CHANGE UP (ref 130–400)
PMV BLD: 11.3 FL — HIGH (ref 7.4–10.4)
POTASSIUM SERPL-MCNC: 4 MMOL/L — SIGNIFICANT CHANGE UP (ref 3.5–5)
POTASSIUM SERPL-SCNC: 4 MMOL/L — SIGNIFICANT CHANGE UP (ref 3.5–5)
PROT SERPL-MCNC: 6.9 G/DL — SIGNIFICANT CHANGE UP (ref 6–8)
PROTHROM AB SERPL-ACNC: 12 SEC — SIGNIFICANT CHANGE UP (ref 9.95–12.87)
RBC # BLD: 4.49 M/UL — SIGNIFICANT CHANGE UP (ref 4.2–5.4)
RBC # FLD: 13.1 % — SIGNIFICANT CHANGE UP (ref 11.5–14.5)
SODIUM SERPL-SCNC: 137 MMOL/L — SIGNIFICANT CHANGE UP (ref 135–146)
WBC # BLD: 9.27 K/UL — SIGNIFICANT CHANGE UP (ref 4.8–10.8)
WBC # FLD AUTO: 9.27 K/UL — SIGNIFICANT CHANGE UP (ref 4.8–10.8)

## 2023-05-23 PROCEDURE — 97535 SELF CARE MNGMENT TRAINING: CPT | Mod: GO

## 2023-05-23 PROCEDURE — 87205 SMEAR GRAM STAIN: CPT

## 2023-05-23 PROCEDURE — C9399: CPT

## 2023-05-23 PROCEDURE — 93970 EXTREMITY STUDY: CPT

## 2023-05-23 PROCEDURE — 97162 PT EVAL MOD COMPLEX 30 MIN: CPT | Mod: GP

## 2023-05-23 PROCEDURE — 0225U NFCT DS DNA&RNA 21 SARSCOV2: CPT

## 2023-05-23 PROCEDURE — 97116 GAIT TRAINING THERAPY: CPT | Mod: GP

## 2023-05-23 PROCEDURE — 85027 COMPLETE CBC AUTOMATED: CPT

## 2023-05-23 PROCEDURE — 73560 X-RAY EXAM OF KNEE 1 OR 2: CPT | Mod: LT

## 2023-05-23 PROCEDURE — 80048 BASIC METABOLIC PNL TOTAL CA: CPT

## 2023-05-23 PROCEDURE — 73502 X-RAY EXAM HIP UNI 2-3 VIEWS: CPT | Mod: 26,LT

## 2023-05-23 PROCEDURE — 99223 1ST HOSP IP/OBS HIGH 75: CPT

## 2023-05-23 PROCEDURE — 87070 CULTURE OTHR SPECIMN AEROBIC: CPT

## 2023-05-23 PROCEDURE — 93005 ELECTROCARDIOGRAM TRACING: CPT

## 2023-05-23 PROCEDURE — 70450 CT HEAD/BRAIN W/O DYE: CPT | Mod: 26

## 2023-05-23 PROCEDURE — 97530 THERAPEUTIC ACTIVITIES: CPT | Mod: GP

## 2023-05-23 PROCEDURE — 86900 BLOOD TYPING SEROLOGIC ABO: CPT

## 2023-05-23 PROCEDURE — 88305 TISSUE EXAM BY PATHOLOGIST: CPT

## 2023-05-23 PROCEDURE — 86850 RBC ANTIBODY SCREEN: CPT

## 2023-05-23 PROCEDURE — 36415 COLL VENOUS BLD VENIPUNCTURE: CPT

## 2023-05-23 PROCEDURE — 71045 X-RAY EXAM CHEST 1 VIEW: CPT | Mod: 26

## 2023-05-23 PROCEDURE — 87635 SARS-COV-2 COVID-19 AMP PRB: CPT

## 2023-05-23 PROCEDURE — 73552 X-RAY EXAM OF FEMUR 2/>: CPT | Mod: 26,LT

## 2023-05-23 PROCEDURE — 86901 BLOOD TYPING SEROLOGIC RH(D): CPT

## 2023-05-23 PROCEDURE — C1713: CPT

## 2023-05-23 PROCEDURE — 89051 BODY FLUID CELL COUNT: CPT

## 2023-05-23 PROCEDURE — 87040 BLOOD CULTURE FOR BACTERIA: CPT

## 2023-05-23 PROCEDURE — 73502 X-RAY EXAM HIP UNI 2-3 VIEWS: CPT | Mod: LT

## 2023-05-23 PROCEDURE — 87075 CULTR BACTERIA EXCEPT BLOOD: CPT

## 2023-05-23 PROCEDURE — 85730 THROMBOPLASTIN TIME PARTIAL: CPT

## 2023-05-23 PROCEDURE — 89060 EXAM SYNOVIAL FLUID CRYSTALS: CPT

## 2023-05-23 PROCEDURE — 81001 URINALYSIS AUTO W/SCOPE: CPT

## 2023-05-23 PROCEDURE — C1889: CPT

## 2023-05-23 PROCEDURE — 83735 ASSAY OF MAGNESIUM: CPT

## 2023-05-23 PROCEDURE — 72170 X-RAY EXAM OF PELVIS: CPT | Mod: 26,59

## 2023-05-23 PROCEDURE — 86803 HEPATITIS C AB TEST: CPT

## 2023-05-23 PROCEDURE — 87086 URINE CULTURE/COLONY COUNT: CPT

## 2023-05-23 PROCEDURE — 97110 THERAPEUTIC EXERCISES: CPT | Mod: GP

## 2023-05-23 PROCEDURE — C1776: CPT

## 2023-05-23 PROCEDURE — 80053 COMPREHEN METABOLIC PANEL: CPT

## 2023-05-23 PROCEDURE — 73560 X-RAY EXAM OF KNEE 1 OR 2: CPT | Mod: 26,LT,59

## 2023-05-23 PROCEDURE — 99285 EMERGENCY DEPT VISIT HI MDM: CPT

## 2023-05-23 PROCEDURE — 97166 OT EVAL MOD COMPLEX 45 MIN: CPT | Mod: GO

## 2023-05-23 PROCEDURE — 72170 X-RAY EXAM OF PELVIS: CPT

## 2023-05-23 PROCEDURE — 81003 URINALYSIS AUTO W/O SCOPE: CPT

## 2023-05-23 PROCEDURE — 85610 PROTHROMBIN TIME: CPT

## 2023-05-23 PROCEDURE — 71045 X-RAY EXAM CHEST 1 VIEW: CPT

## 2023-05-23 PROCEDURE — 73562 X-RAY EXAM OF KNEE 3: CPT | Mod: 26,LT

## 2023-05-23 PROCEDURE — 88311 DECALCIFY TISSUE: CPT

## 2023-05-23 PROCEDURE — 73562 X-RAY EXAM OF KNEE 3: CPT | Mod: LT

## 2023-05-23 PROCEDURE — 85025 COMPLETE CBC W/AUTO DIFF WBC: CPT

## 2023-05-23 PROCEDURE — 70450 CT HEAD/BRAIN W/O DYE: CPT | Mod: MA

## 2023-05-23 RX ORDER — ENOXAPARIN SODIUM 100 MG/ML
40 INJECTION SUBCUTANEOUS ONCE
Refills: 0 | Status: COMPLETED | OUTPATIENT
Start: 2023-05-23 | End: 2023-05-23

## 2023-05-23 RX ORDER — SIMVASTATIN 20 MG/1
1 TABLET, FILM COATED ORAL
Qty: 0 | Refills: 0 | DISCHARGE

## 2023-05-23 RX ORDER — OXYCODONE HYDROCHLORIDE 5 MG/1
2.5 TABLET ORAL ONCE
Refills: 0 | Status: DISCONTINUED | OUTPATIENT
Start: 2023-05-23 | End: 2023-05-23

## 2023-05-23 RX ORDER — LANOLIN ALCOHOL/MO/W.PET/CERES
3 CREAM (GRAM) TOPICAL AT BEDTIME
Refills: 0 | Status: DISCONTINUED | OUTPATIENT
Start: 2023-05-23 | End: 2023-05-26

## 2023-05-23 RX ORDER — KETOROLAC TROMETHAMINE 30 MG/ML
15 SYRINGE (ML) INJECTION ONCE
Refills: 0 | Status: DISCONTINUED | OUTPATIENT
Start: 2023-05-23 | End: 2023-05-23

## 2023-05-23 RX ORDER — OMEGA-3 ACID ETHYL ESTERS 1 G
0 CAPSULE ORAL
Qty: 0 | Refills: 0 | DISCHARGE

## 2023-05-23 RX ORDER — ONDANSETRON 8 MG/1
4 TABLET, FILM COATED ORAL EVERY 8 HOURS
Refills: 0 | Status: DISCONTINUED | OUTPATIENT
Start: 2023-05-23 | End: 2023-05-26

## 2023-05-23 RX ORDER — BUPIVACAINE HCL/PF 7.5 MG/ML
28 VIAL (ML) INJECTION ONCE
Refills: 0 | Status: COMPLETED | OUTPATIENT
Start: 2023-05-23 | End: 2023-05-23

## 2023-05-23 RX ORDER — SIMVASTATIN 20 MG/1
20 TABLET, FILM COATED ORAL AT BEDTIME
Refills: 0 | Status: DISCONTINUED | OUTPATIENT
Start: 2023-05-23 | End: 2023-05-26

## 2023-05-23 RX ORDER — ASPIRIN/CALCIUM CARB/MAGNESIUM 324 MG
81 TABLET ORAL DAILY
Refills: 0 | Status: DISCONTINUED | OUTPATIENT
Start: 2023-05-23 | End: 2023-05-26

## 2023-05-23 RX ORDER — ACETAMINOPHEN 500 MG
650 TABLET ORAL EVERY 6 HOURS
Refills: 0 | Status: DISCONTINUED | OUTPATIENT
Start: 2023-05-23 | End: 2023-05-26

## 2023-05-23 RX ORDER — METOPROLOL TARTRATE 50 MG
25 TABLET ORAL DAILY
Refills: 0 | Status: DISCONTINUED | OUTPATIENT
Start: 2023-05-23 | End: 2023-05-26

## 2023-05-23 RX ORDER — ACETAMINOPHEN 500 MG
650 TABLET ORAL ONCE
Refills: 0 | Status: COMPLETED | OUTPATIENT
Start: 2023-05-23 | End: 2023-05-23

## 2023-05-23 RX ORDER — METOPROLOL TARTRATE 50 MG
1 TABLET ORAL
Qty: 0 | Refills: 0 | DISCHARGE

## 2023-05-23 RX ADMIN — Medication 28 MILLILITER(S): at 16:00

## 2023-05-23 RX ADMIN — ENOXAPARIN SODIUM 40 MILLIGRAM(S): 100 INJECTION SUBCUTANEOUS at 20:04

## 2023-05-23 RX ADMIN — Medication 650 MILLIGRAM(S): at 20:03

## 2023-05-23 RX ADMIN — Medication 10 MILLIGRAM(S): at 20:03

## 2023-05-23 RX ADMIN — SIMVASTATIN 20 MILLIGRAM(S): 20 TABLET, FILM COATED ORAL at 22:04

## 2023-05-23 RX ADMIN — Medication 15 MILLIGRAM(S): at 21:14

## 2023-05-23 NOTE — ED PROVIDER NOTE - PHYSICAL EXAMINATION
VITAL SIGNS: I have reviewed nursing notes and confirm.  CONSTITUTIONAL: Well-appearing, non-toxic, in NAD  SKIN: Warm dry, normal skin turgor  HEAD: NCAT  EYES: No conjunctival injection, scleral anicteric, PERRLA, EOMI.   ENT: Moist mucous membranes, normal pharynx with no erythema or exudates  NECK: Supple; full ROM. Nontender. No cervical LAD  CARD: RRR, no murmurs, rubs or gallops  RESP: Clear to ausculation bilaterally.  No rales, rhonchi, or wheezing.  ABD: Soft, non-distended, non-tender, no rebound or guarding. No CVA tenderness  EXT: Left leg with external rotation, slightly shorter compared to right. No other bony tenderness or deformity.   NEURO: Normal motor, normal sensory. CN II-XII intact and equal b/l. Normal gait.  PSYCH: Cooperative, appropriate.

## 2023-05-23 NOTE — H&P ADULT - NSICDXPASTMEDICALHX_GEN_ALL_CORE_FT
PAST MEDICAL HISTORY:  Breast CA Left -1/2021    Cancer BREAST   MASTECTOMY RIGHT  CHEMO [id number]    HTN (hypertension)     Hypercholesteremia     Murmur     Pre-diabetes     TIA (transient ischemic attack) 2016

## 2023-05-23 NOTE — ED PROVIDER NOTE - OBJECTIVE STATEMENT
78-year-old female past medical history significant for hypertension, dyslipidemia, TIA, breast CA status post right mastectomy, status post fall while getting dressed this morning.  Patient complaining of left hip pain and inability to ambulate due to hip pain.  Positive head trauma, no LOC.  Patient denies neck or back pain.  No chest pain or shortness of breath.  No abdominal pain.  Patient is not on any anticoagulation.

## 2023-05-23 NOTE — ED PROVIDER NOTE - CLINICAL SUMMARY MEDICAL DECISION MAKING FREE TEXT BOX
78-year-old female past medical history as documented status post fall this morning.  X-rays with left hip fracture.  All diagnostic testing with no other acute traumatic injuries.  Femoral nerve block performed.  All labs reviewed.  Ortho consulted.  Patient admitted to medicine service.

## 2023-05-23 NOTE — PATIENT PROFILE ADULT - FALL HARM RISK - HARM RISK INTERVENTIONS
Assistance OOB with selected safe patient handling equipment/Communicate Risk of Fall with Harm to all staff/Reinforce activity limits and safety measures with patient and family/Reorient to person, place and time as needed/Tailored Fall Risk Interventions/Use of alarms - bed, chair and/or voice tab/Visual Cue: Yellow wristband and red socks/Bed in lowest position, wheels locked, appropriate side rails in place/Call bell, personal items and telephone in reach/Instruct patient to call for assistance before getting out of bed or chair/Non-slip footwear when patient is out of bed/[place] to call system/Physically safe environment - no spills, clutter or unnecessary equipment/Purposeful Proactive Rounding/Room/bathroom lighting operational, light cord in reach

## 2023-05-23 NOTE — H&P ADULT - NSHPLABSRESULTS_GEN_ALL_CORE
< from: Xray Femur 2 Views, Left (05.23.23 @ 15:10) >    INTERPRETATION:  Clinical History / Reason for exam: Trauma.    Views of the left hip and pelvis.    Findings/  impression:    Examination reveals a left femoral neck fracture with superior placement   of the femoral shaft. The bones are diffusely demineralized.    --- End of Report ---    < end of copied text >

## 2023-05-23 NOTE — ED ADULT NURSE NOTE - NSICDXPASTMEDICALHX_GEN_ALL_CORE_FT
PAST MEDICAL HISTORY:  Breast CA Left -1/2021    Cancer BREAST   MASTECTOMY RIGHT  CHEMO [id number]    HTN (hypertension)     Hypercholesteremia     Murmur     Pre-diabetes     TIA (transient ischemic attack) 2016

## 2023-05-23 NOTE — PROCEDURAL SAFETY CHECKLIST WITH OR WITHOUT SEDATION - NSRESOLVEDISCREP_GEN_ALL_CORE
I don't have any 504 forms-- is she has a form she needs to bring it to the office to fill; please run a brice I thought she was off the neurontin   done

## 2023-05-23 NOTE — H&P ADULT - ASSESSMENT
This is a 77 y/o F with PMHx of HTN, HLD, TIA, breast cancer (s/p bilateral mastectomy, in remission) presents with pain in left hip s/p mechanical fall, Found to have Left acute femoral neck fracture. Patient was evaluated by ortho and is planned for surgery tomorrow.     #Left acute femoral neck fracture  - Ortho recs appreciated   - Added on for tomorrow 5/24/23 for L hip hemiarthroplasty  - F/u L hip xray and L knee xray  - F/u CT H  - Bed rest  - Pain control   - s/p Left femoral nerve block in the ED  - s/p 1 dose of Lovenox for DVT ppx, hold dose tomorrow for surgery   - NPO after midnight for surgery   - pending pre-op risk stratification      This is a 77 y/o F with PMHx of HTN, HLD, TIA, breast cancer (s/p bilateral mastectomy, in remission) presents with pain in left hip s/p mechanical fall, Found to have Left acute femoral neck fracture. Patient was evaluated by ortho and is planned for surgery tomorrow.     #Left acute femoral neck fracture  - Ortho recs appreciated   - Added on for tomorrow 5/24/23 for L hip hemiarthroplasty  - F/u L hip xray and L knee xray  - F/u CT H  - Bed rest  - Pain control   - s/p Left femoral nerve block in the ED  - s/p 1 dose of Lovenox for DVT ppx, hold dose tomorrow for surgery   - NPO after midnight for surgery   - pending pre-op risk stratification     #HTN   - Uncontrolled likely due to pain   - restart home meds   - on enalapril and metoprolol     #Hx of TIA  - c/w ASA and statin      #Misc  - DVT prophylaxis: Lovenox (hold AM dose)  - GI prophylaxis: not indicated   - Diet: DASH, NPO after midnight   - Activity: Bed rest   - Disposition: admit to medicine

## 2023-05-23 NOTE — H&P ADULT - NSHPPHYSICALEXAM_GEN_ALL_CORE
LOS:     VITALS:   T(C): --  HR: 87 (05-23-23 @ 18:03) (80 - 93)  BP: 139/84 (05-23-23 @ 18:03) (139/84 - 204/93)  RR: 18 (05-23-23 @ 18:03) (17 - 18)  SpO2: 99% (05-23-23 @ 18:03) (96% - 99%)    GENERAL: NAD, lying in bed comfortably  HEAD:  Atraumatic, Normocephalic  EYES: EOMI, PERRLA, conjunctiva and sclera clear  ENT: Moist mucous membranes  NECK: Supple, No JVD  CHEST/LUNG: Clear to auscultation bilaterally; No rales, rhonchi, wheezing, or rubs. Unlabored respirations  HEART: Regular rate and rhythm; No murmurs, rubs, or gallops  ABDOMEN: BSx4; Soft, nontender, nondistended  EXTREMITIES:  No open skin or wounds, Left Leg shortened and externally rotated, Limited ROM, Compartments soft and compressible. No pain on passive stretch.  NERVOUS SYSTEM:  A&Ox3, no focal deficits   SKIN: No rashes or lesions

## 2023-05-23 NOTE — H&P ADULT - ATTENDING COMMENTS
79 y/o F with PMHx of HTN, HLD, TIA, breast cancer (s/p bilateral mastectomy, in remission) presents with pain in left hip s/p mechanical fall, Found to have Left acute femoral neck fracture. Patient was evaluated by ortho and is planned for surgery tomorrow.     Agree  with assessment  except for changes below.     IMPRESSION  Mechanical Fall completed by Left  Acute Femoral  Neck  Fracture 79 y/o F with PMHx of HTN, HLD, TIA, breast cancer (s/p bilateral mastectomy, in remission) presents with pain in left hip s/p mechanical fall, Found to have Left acute femoral neck fracture. Patient was evaluated by ortho and is planned for surgery tomorrow.     Agree  with assessment  except for changes below.     CTH  No acute intracranial pathology. No evidence of midline shift, mass   effect or intracranial hemorrhage.    Xray: Examination reveals a left femoral neck fracture with Superior Placement   of the femoral shaft. The bones are diffusely demineralized.    IMPRESSION  Mechanical Fall completed by Left  Acute Femoral  Neck  Fracture  - Ortho following   - Added on for tomorrow 5/24/23 for L hip hemiarthroplasty  - F/u L hip xray and L knee xray  - F/u CT H  - Bed rest  - Pain control   - Check Orthostatics  - s/p Left femoral nerve block in the ED  - s/p 1 dose of Lovenox for DVT ppx, hold dose tomorrow for surgery   - NPO after midnight for surgery    Hx HTN - restart home meds   - on enalapril and metoprolol     Hx of TIA - c/w ASA and statin 79 y/o F with PMHx of HTN, HLD, TIA, breast cancer (s/p bilateral mastectomy, in remission) presents with pain in left hip s/p mechanical fall, Found to have Left acute femoral neck fracture. Patient was evaluated by ortho and is planned for surgery tomorrow.     Agree  with assessment  except for changes below.     VITAL SIGNS: AFebrile, vital signs stable  CONSTITUTIONAL: Well-developed; well-nourished; in no acute distress.  SKIN: Skin exam is warm and dry, no acute rash.  HEAD: Normocephalic; atraumatic.  EYES: Extraocular movements intact  ENT: No nasal discharge; airway clear. Moist mucus membranes.  NECK: Supple; non tender. No rigidity  CARD: Rregular rate and rhythm. Normal S1, S2; no murmurs, gallops, or rubs.  RESP: On  auscultation bilaterally. No wheezes, rales or rhonchi.  ABD: Abdomen soft; non-tender; non-distended  EXT: Normal ROM. No clubbing,  Left foot externally rotated , Left hip Pain  NEURO: Alert and oriented x 3. No focal deficits.  PSYCH: cooperative, appropriate.     CTH  No acute intracranial pathology. No evidence of midline shift, mass   effect or intracranial hemorrhage.    Xray: Examination reveals a left femoral neck fracture with Superior Placement   of the femoral shaft. The bones are diffusely demineralized.    IMPRESSION  Mechanical Fall completed by Left  Acute Femoral  Neck  Fracture  - Ortho following   - Added on for tomorrow 5/24/23 for L hip hemiarthroplasty  - F/u L hip xray and L knee xray  - F/u CT H  - Bed rest  - Pain control   - Check Orthostatics  - s/p Left femoral nerve block in the ED  - s/p 1 dose of Lovenox for DVT ppx, hold dose tomorrow for surgery   - NPO after midnight for surgery    Hx HTN - restart home meds   - on enalapril and metoprolol     Hx of TIA - c/w ASA and statin    No history of MI, DM, or CKD but has a Hx CVA. Hence, her RCRI score is 1 points indicating a Class I Risk, or a 6.0 % 30-day risk of death, MI, or cardiac arrest.  -METS > 4. He denies CP, SOB, palpitations, orthopnea. Patient is not clinically in heart failure.  No history of bleeding or reaction to anesthesia  -Patient is a low risk candidate for an intermediate risk procedure.     Seen on 05/23/23

## 2023-05-23 NOTE — PATIENT PROFILE ADULT - NSPRESCRALCFREQ_GEN_A_NUR
Detail Level: Detailed Show Applicator Variable?: Yes Render Note In Bullet Format When Appropriate: No Number Of Freeze-Thaw Cycles: 2 freeze-thaw cycles Duration Of Freeze Thaw-Cycle (Seconds): 10 Post-Care Instructions: I reviewed with the patient in detail post-care instructions. Patient is to wear sunprotection, and avoid picking at any of the treated lesions. Pt may apply Vaseline to crusted or scabbing areas. Consent: The patient's consent was obtained including but not limited to risks of crusting, scabbing, blistering, scarring, darker or lighter pigmentary change, recurrence, incomplete removal and infection. Never

## 2023-05-23 NOTE — CONSULT NOTE ADULT - SUBJECTIVE AND OBJECTIVE BOX
Sameer Kirk LPN         ORTHOPAEDIC SURGERY CONSULT NOTE    Reason for Consult: Left femoral neck fracture    HPI: 78yFemale PMH of HTN, HLD, TIA, breast cancer (s/p bilateral mastectomy, in remission) presents with pain in left hip s/p mechanical ground level fall this afternoon. Patient reports she tripped and fell while at home in her bedroom. Reports head trauma but denies LOC. Reports left hip pain, but denies pain elsewhere. Does not use an assistive device at baseline. Denies hip pain prior to fall. Reports she can walk multiple blocks with no pain. Currently on aspirin 81mg. Denies paresthesias. Lives at home with .    PAST MEDICAL & SURGICAL HISTORY:  HTN (hypertension)      Hypercholesteremia      TIA (transient ischemic attack)  2016      Murmur      Cancer  BREAST   MASTECTOMY RIGHT  CHEMO [id number]      Breast CA  Left -1/2021      Pre-diabetes      H/O right mastectomy  1989      S/P excision of lipoma      H/O left mastectomy  2021      History of lung biopsy  3/2021        Allergies: penicillins (Swelling)    Medications: BUpivacaine 0.5% (Preservative-Free) Injectable 28 milliLiter(s) Local Injection Once      PHYSICAL EXAM:  Vital Signs Last 24 Hrs  T(C): --  T(F): --  HR: 93 (23 May 2023 16:14) (80 - 93)  BP: 204/93 (23 May 2023 16:14) (160/100 - 204/93)  BP(mean): --  RR: 17 (23 May 2023 16:14) (17 - 18)  SpO2: 98% (23 May 2023 16:14) (96% - 98%)    Parameters below as of 23 May 2023 16:14  Patient On (Oxygen Delivery Method): room air        Physical Exam:  General: NAD. AAOx3.  Resp: NLB on RA.    LLE:   No open skin or wounds  Leg shortened and externally rotated  TTP hip, NTTP along remainder of extremity   ROM limited by pain   SILT DP/SP/T/[place]/Sa.   EHL/FHL/TA/Gs motor intact.  2+ DP/PT pulses with normal cap refill distally.  Compartments soft and compressible. No pain on passive stretch.    Labs:                        13.9   9.27  )-----------( 160      ( 23 May 2023 14:52 )             40.5     05-23    137  |  101  |  17  ----------------------------<  113<H>  4.0   |  22  |  0.8    Ca    9.4      23 May 2023 14:52    TPro  6.9  /  Alb  4.4  /  TBili  0.4  /  DBili  x   /  AST  17  /  ALT  13  /  AlkPhos  71  05-23    PT/INR - ( 23 May 2023 14:52 )   PT: 12.00 sec;   INR: 1.05 ratio         PTT - ( 23 May 2023 14:52 )  PTT:27.3 sec    Imaging XR: left transcervical femoral neck fracture    A/P: 78y Female with Left acute femoral neck fracture. Discussed with patient that she will require surgery for fixation.    - Added on for tomorrow 5/24/23 for L hip hemiarthroplasty  - Please obtain dedicated L hip XR and L knee XR  - Bed rest  - Pain control per primary team  - DVT PPx per primary, to be held on morning of procedure; please give 1 dose of lovenox tonight  - Patient requires Internal Medicine pre-op clearance / risk stratification / medical optimization  - Pre-Op CBC, CMP/BMP, PT/PTT/INR, Type & Screen x2, CXR, EKG, COVID test  - Trend Hgb  - 2u RBC on hold for surgery  - NPO for surgery tomorrow ORTHOPAEDIC SURGERY CONSULT NOTE    Reason for Consult: Left femoral neck fracture    HPI: 78yFemale PMH of HTN, HLD, TIA, breast cancer (s/p bilateral mastectomy, in remission) presents with pain in left hip s/p mechanical ground level fall this afternoon. Patient reports she tripped and fell while at home in her bedroom. Reports head trauma but denies LOC. Reports left hip pain, but denies pain elsewhere. Does not use an assistive device at baseline. Denies hip pain prior to fall. Reports she can walk multiple blocks with no pain. Currently on aspirin 81mg. Denies paresthesias. Lives at home with  Carlos ([phone]).    PAST MEDICAL & SURGICAL HISTORY:  HTN (hypertension)      Hypercholesteremia      TIA (transient ischemic attack)  2016      Murmur      Cancer  BREAST   MASTECTOMY RIGHT  CHEMO [id number]      Breast CA  Left -1/2021      Pre-diabetes      H/O right mastectomy  1989      S/P excision of lipoma      H/O left mastectomy  2021      History of lung biopsy  3/2021        Allergies: penicillins (Swelling)    Medications: BUpivacaine 0.5% (Preservative-Free) Injectable 28 milliLiter(s) Local Injection Once      PHYSICAL EXAM:  Vital Signs Last 24 Hrs  T(C): --  T(F): --  HR: 93 (23 May 2023 16:14) (80 - 93)  BP: 204/93 (23 May 2023 16:14) (160/100 - 204/93)  BP(mean): --  RR: 17 (23 May 2023 16:14) (17 - 18)  SpO2: 98% (23 May 2023 16:14) (96% - 98%)    Parameters below as of 23 May 2023 16:14  Patient On (Oxygen Delivery Method): room air        Physical Exam:  General: NAD. AAOx3.  Resp: NLB on RA.    LLE:   No open skin or wounds  Leg shortened and externally rotated  TTP hip, NTTP along remainder of extremity   ROM limited by pain   SILT DP/SP/T/[place]/Sa.   EHL/FHL/TA/Gs motor intact.  2+ DP/PT pulses with normal cap refill distally.  Compartments soft and compressible. No pain on passive stretch.    Labs:                        13.9   9.27  )-----------( 160      ( 23 May 2023 14:52 )             40.5     05-23    137  |  101  |  17  ----------------------------<  113<H>  4.0   |  22  |  0.8    Ca    9.4      23 May 2023 14:52    TPro  6.9  /  Alb  4.4  /  TBili  0.4  /  DBili  x   /  AST  17  /  ALT  13  /  AlkPhos  71  05-23    PT/INR - ( 23 May 2023 14:52 )   PT: 12.00 sec;   INR: 1.05 ratio         PTT - ( 23 May 2023 14:52 )  PTT:27.3 sec    Imaging XR: left transcervical femoral neck fracture    A/P: 78y Female with Left acute femoral neck fracture. Discussed with patient that she will require surgery for fixation.    - Added on for tomorrow 5/24/23 for L hip hemiarthroplasty  - Please obtain dedicated L hip XR and L knee XR  - Bed rest  - Pain control per primary team  - DVT PPx per primary, to be held on morning of procedure; please give 1 dose of lovenox tonight  - Patient requires Internal Medicine pre-op clearance / risk stratification / medical optimization  - Pre-Op CBC, CMP/BMP, PT/PTT/INR, Type & Screen x2, CXR, EKG, COVID test  - Trend Hgb  - 2u RBC on hold for surgery  - NPO for surgery tomorrow

## 2023-05-23 NOTE — ED PROVIDER NOTE - NSICDXPASTMEDICALHX_GEN_ALL_CORE_FT
PAST MEDICAL HISTORY:  Breast CA Left -1/2021    Cancer BREAST   MASTECTOMY RIGHT  CHEMO [id number]    HTN (hypertension)     Hypercholesteremia     Murmur     Pre-diabetes     TIA (transient ischemic attack) 2016

## 2023-05-23 NOTE — ED PROVIDER NOTE - ATTENDING CONTRIBUTION TO CARE
I personally evaluated the patient. I reviewed the Resident’s or Physician Assistant’s note (as assigned above), and agree with the findings and plan except as documented in my note.   78-year-old female past medical history significant for hypertension, dyslipidemia, TIA, breast CA status post right mastectomy, status post fall while getting dressed this morning.  Patient complaining of left hip pain and inability to ambulate due to hip pain.  Positive head trauma, no LOC.  Patient denies neck or back pain.  No chest pain or shortness of breath.  No abdominal pain.  Patient is not on any anticoagulation.  Vitals noted. ALERT OX3 NAD GCS-15. NCAT. PERRL, EOMI. NO MIDLINE C SPINE TENDERNESS. LUNGS CLEAR B/L. CHEST NONTENDER, NO CREPITUS. S/P R MASTECTOMY, RRR. ABD- SOFT NONTENDER. PELVIS STABLE NONTENDER. BACK NONTENDER. NO SPINE TENDERNESS. NEURO EXAM NONFOCAL. L GROIN WITH TENDERNESS. NO L HIP TENDERNESS. L HIP WITH DECREASED ROM DUE TO PAIN. + RLE SHORTENING AND EXT ROTATION. R KNEE NONTENDER, FROM. LLE NVI. 2+ PEDAL PULSES B/L.

## 2023-05-23 NOTE — H&P ADULT - HISTORY OF PRESENT ILLNESS
This is a 77 y/o F with PMHx of HTN, HLD, TIA, breast cancer (s/p bilateral mastectomy, in remission) presents with pain in left hip s/p mechanical ground level fall this afternoon. Patient reports she tripped and fell while at home in her bedroom. Reports head trauma but denies LOC. Reports left hip pain, but denies pain elsewhere. Does not use an assistive device at baseline. Denies hip pain prior to fall. Reports she can walk multiple blocks with no pain.     ED Vitals   HR: 87 (05-23-23 @ 18:03) (80 - 93)  BP: 139/84 (05-23-23 @ 18:03) (139/84 - 204/93)  RR: 18 (05-23-23 @ 18:03) (17 - 18)  SpO2: 99% (05-23-23 @ 18:03) (96% - 99%)    Labs significant for Hb 13.9, HCT 40.5, WBC 9.27, , TPro  6.9  /  Alb  4.4  /  TBili  0.4  /  DBili  x   /  AST  17  /  ALT  13  /  AlkPhos  71  05-23    137  |  101  |  17  ----------------------------<  113<H>  4.0   |  22  |  0.8    Xray of pelvis:  left femoral neck fracture with superior placement of the femoral shaft. The bones are diffusely demineralized.                       This is a 79 y/o F with PMHx of HTN, HLD, TIA, breast cancer (s/p bilateral mastectomy, in remission) presents with pain in left hip s/p mechanical ground level fall this afternoon. Patient reports she tripped and fell while at home in her bedroom while trying to put on her pants. Reports head trauma but denies LOC. Reports left hip pain, but denies pain elsewhere. Does not use an assistive device at baseline. Denies hip pain prior to fall. Reports she can walk multiple blocks with no pain. Patient denies any fever, chills, headache, dizziness. Patient denies chest pain, palpitation, SOB. Patient denies abdominal pain, n/v/d/c.    ED Vitals   HR: 87 (05-23-23 @ 18:03) (80 - 93)  BP: 139/84 (05-23-23 @ 18:03) (139/84 - 204/93)  RR: 18 (05-23-23 @ 18:03) (17 - 18)  SpO2: 99% (05-23-23 @ 18:03) (96% - 99%)    Labs significant for Hb 13.9, HCT 40.5, WBC 9.27, , TPro  6.9  /  Alb  4.4  /  TBili  0.4  /  DBili  x   /  AST  17  /  ALT  13  /  AlkPhos  71  05-23    137  |  101  |  17  ----------------------------<  113<H>  4.0   |  22  |  0.8    Xray of pelvis:  left femoral neck fracture with superior placement of the femoral shaft. The bones are diffusely demineralized.

## 2023-05-24 LAB
ANION GAP SERPL CALC-SCNC: 11 MMOL/L — SIGNIFICANT CHANGE UP (ref 7–14)
APTT BLD: 29.9 SEC — SIGNIFICANT CHANGE UP (ref 27–39.2)
BASOPHILS # BLD AUTO: 0.02 K/UL — SIGNIFICANT CHANGE UP (ref 0–0.2)
BASOPHILS NFR BLD AUTO: 0.2 % — SIGNIFICANT CHANGE UP (ref 0–1)
BLD GP AB SCN SERPL QL: SIGNIFICANT CHANGE UP
BUN SERPL-MCNC: 14 MG/DL — SIGNIFICANT CHANGE UP (ref 10–20)
CALCIUM SERPL-MCNC: 8.9 MG/DL — SIGNIFICANT CHANGE UP (ref 8.4–10.4)
CHLORIDE SERPL-SCNC: 100 MMOL/L — SIGNIFICANT CHANGE UP (ref 98–110)
CO2 SERPL-SCNC: 22 MMOL/L — SIGNIFICANT CHANGE UP (ref 17–32)
CREAT SERPL-MCNC: 0.8 MG/DL — SIGNIFICANT CHANGE UP (ref 0.7–1.5)
EGFR: 76 ML/MIN/1.73M2 — SIGNIFICANT CHANGE UP
EOSINOPHIL # BLD AUTO: 0.14 K/UL — SIGNIFICANT CHANGE UP (ref 0–0.7)
EOSINOPHIL NFR BLD AUTO: 1.4 % — SIGNIFICANT CHANGE UP (ref 0–8)
GLUCOSE SERPL-MCNC: 116 MG/DL — HIGH (ref 70–99)
HCT VFR BLD CALC: 41.4 % — SIGNIFICANT CHANGE UP (ref 37–47)
HCV AB S/CO SERPL IA: 0.04 COI — SIGNIFICANT CHANGE UP
HCV AB SERPL-IMP: SIGNIFICANT CHANGE UP
HGB BLD-MCNC: 14.1 G/DL — SIGNIFICANT CHANGE UP (ref 12–16)
IMM GRANULOCYTES NFR BLD AUTO: 0.4 % — HIGH (ref 0.1–0.3)
INR BLD: 1.09 RATIO — SIGNIFICANT CHANGE UP (ref 0.65–1.3)
LYMPHOCYTES # BLD AUTO: 1.43 K/UL — SIGNIFICANT CHANGE UP (ref 1.2–3.4)
LYMPHOCYTES # BLD AUTO: 14.2 % — LOW (ref 20.5–51.1)
MCHC RBC-ENTMCNC: 30.6 PG — SIGNIFICANT CHANGE UP (ref 27–31)
MCHC RBC-ENTMCNC: 34.1 G/DL — SIGNIFICANT CHANGE UP (ref 32–37)
MCV RBC AUTO: 89.8 FL — SIGNIFICANT CHANGE UP (ref 81–99)
MONOCYTES # BLD AUTO: 0.64 K/UL — HIGH (ref 0.1–0.6)
MONOCYTES NFR BLD AUTO: 6.3 % — SIGNIFICANT CHANGE UP (ref 1.7–9.3)
NEUTROPHILS # BLD AUTO: 7.83 K/UL — HIGH (ref 1.4–6.5)
NEUTROPHILS NFR BLD AUTO: 77.5 % — HIGH (ref 42.2–75.2)
NRBC # BLD: 0 /100 WBCS — SIGNIFICANT CHANGE UP (ref 0–0)
PLATELET # BLD AUTO: 151 K/UL — SIGNIFICANT CHANGE UP (ref 130–400)
PMV BLD: 11.3 FL — HIGH (ref 7.4–10.4)
POTASSIUM SERPL-MCNC: 4.6 MMOL/L — SIGNIFICANT CHANGE UP (ref 3.5–5)
POTASSIUM SERPL-SCNC: 4.6 MMOL/L — SIGNIFICANT CHANGE UP (ref 3.5–5)
PROTHROM AB SERPL-ACNC: 12.5 SEC — SIGNIFICANT CHANGE UP (ref 9.95–12.87)
RBC # BLD: 4.61 M/UL — SIGNIFICANT CHANGE UP (ref 4.2–5.4)
RBC # FLD: 13.1 % — SIGNIFICANT CHANGE UP (ref 11.5–14.5)
SODIUM SERPL-SCNC: 133 MMOL/L — LOW (ref 135–146)
WBC # BLD: 10.1 K/UL — SIGNIFICANT CHANGE UP (ref 4.8–10.8)
WBC # FLD AUTO: 10.1 K/UL — SIGNIFICANT CHANGE UP (ref 4.8–10.8)

## 2023-05-24 PROCEDURE — 93010 ELECTROCARDIOGRAM REPORT: CPT

## 2023-05-24 PROCEDURE — 99232 SBSQ HOSP IP/OBS MODERATE 35: CPT

## 2023-05-24 RX ORDER — ENOXAPARIN SODIUM 100 MG/ML
40 INJECTION SUBCUTANEOUS ONCE
Refills: 0 | Status: COMPLETED | OUTPATIENT
Start: 2023-05-24 | End: 2023-05-24

## 2023-05-24 RX ORDER — SODIUM CHLORIDE 9 MG/ML
1000 INJECTION, SOLUTION INTRAVENOUS
Refills: 0 | Status: DISCONTINUED | OUTPATIENT
Start: 2023-05-24 | End: 2023-05-25

## 2023-05-24 RX ORDER — KETOROLAC TROMETHAMINE 30 MG/ML
30 SYRINGE (ML) INJECTION ONCE
Refills: 0 | Status: DISCONTINUED | OUTPATIENT
Start: 2023-05-24 | End: 2023-05-24

## 2023-05-24 RX ORDER — MORPHINE SULFATE 50 MG/1
2 CAPSULE, EXTENDED RELEASE ORAL EVERY 4 HOURS
Refills: 0 | Status: DISCONTINUED | OUTPATIENT
Start: 2023-05-24 | End: 2023-05-26

## 2023-05-24 RX ADMIN — ENOXAPARIN SODIUM 40 MILLIGRAM(S): 100 INJECTION SUBCUTANEOUS at 16:10

## 2023-05-24 RX ADMIN — MORPHINE SULFATE 2 MILLIGRAM(S): 50 CAPSULE, EXTENDED RELEASE ORAL at 09:43

## 2023-05-24 RX ADMIN — MORPHINE SULFATE 2 MILLIGRAM(S): 50 CAPSULE, EXTENDED RELEASE ORAL at 21:01

## 2023-05-24 RX ADMIN — Medication 25 MILLIGRAM(S): at 05:05

## 2023-05-24 RX ADMIN — SIMVASTATIN 20 MILLIGRAM(S): 20 TABLET, FILM COATED ORAL at 21:01

## 2023-05-24 RX ADMIN — SODIUM CHLORIDE 75 MILLILITER(S): 9 INJECTION, SOLUTION INTRAVENOUS at 09:41

## 2023-05-24 RX ADMIN — MORPHINE SULFATE 2 MILLIGRAM(S): 50 CAPSULE, EXTENDED RELEASE ORAL at 16:11

## 2023-05-24 NOTE — PROGRESS NOTE ADULT - SUBJECTIVE AND OBJECTIVE BOX
RUDY STINSON 78y Female  MRN#: [id number]   Hospital Day: 1d    SUBJECTIVE  Patient is a 78y old Female who presents with a chief complaint of Left femoral neck fracture (23 May 2023 18:50)  Currently admitted to medicine with the primary diagnosis of Hip fracture      INTERVAL HPI AND OVERNIGHT EVENTS:  Patient was examined and seen at bedside. This morning she is resting comfortably in bed and reports no issues or overnight events.    OBJECTIVE  PAST MEDICAL & SURGICAL HISTORY  HTN (hypertension)    Hypercholesteremia    TIA (transient ischemic attack)  2016    Murmur    Cancer  BREAST   MASTECTOMY RIGHT  CHEMO [id number]    Breast CA  Left -1/2021    Pre-diabetes    H/O right mastectomy  1989    S/P excision of lipoma    H/O left mastectomy  2021    History of lung biopsy  3/2021      ALLERGIES:  penicillins (Swelling)    MEDICATIONS:  STANDING MEDICATIONS  aspirin  chewable 81 milliGRAM(s) Oral daily  enalapril 10 milliGRAM(s) Oral daily  lactated ringers. 1000 milliLiter(s) IV Continuous <Continuous>  metoprolol tartrate 25 milliGRAM(s) Oral daily  simvastatin 20 milliGRAM(s) Oral at bedtime    PRN MEDICATIONS  acetaminophen     Tablet .. 650 milliGRAM(s) Oral every 6 hours PRN  aluminum hydroxide/magnesium hydroxide/simethicone Suspension 30 milliLiter(s) Oral every 4 hours PRN  melatonin 3 milliGRAM(s) Oral at bedtime PRN  morphine  - Injectable 2 milliGRAM(s) IV Push every 4 hours PRN  ondansetron Injectable 4 milliGRAM(s) IV Push every 8 hours PRN      VITAL SIGNS: Last 24 Hours  T(C): 36.6 (24 May 2023 08:27), Max: 37.2 (23 May 2023 21:30)  T(F): 97.8 (24 May 2023 08:27), Max: 99 (23 May 2023 21:30)  HR: 61 (24 May 2023 08:27) (52 - 93)  BP: 111/68 (24 May 2023 08:27) (111/68 - 204/93)  BP(mean): --  RR: 18 (24 May 2023 08:27) (17 - 18)  SpO2: 92% (23 May 2023 21:30) (92% - 99%)    LABS:                        13.9   9.27  )-----------( 160      ( 23 May 2023 14:52 )             40.5     05-23    137  |  101  |  17  ----------------------------<  113<H>  4.0   |  22  |  0.8    Ca    9.4      23 May 2023 14:52    TPro  6.9  /  Alb  4.4  /  TBili  0.4  /  DBili  x   /  AST  17  /  ALT  13  /  AlkPhos  71  05-23    PT/INR - ( 23 May 2023 14:52 )   PT: 12.00 sec;   INR: 1.05 ratio         PTT - ( 23 May 2023 14:52 )  PTT:27.3 sec              RADIOLOGY:      PHYSICAL EXAM:  CONSTITUTIONAL: No acute distress, well-developed, well-groomed, AAOx3  PULMONARY: Clear to auscultation bilaterally; no wheezes, rales, or rhonchi  CARDIOVASCULAR: Regular rate and rhythm; no murmurs, rubs, or gallops  GASTROINTESTINAL: Soft, non-tender, non-distended; bowel sounds present  MUSCULOSKELETAL: left hip pain upon palpation

## 2023-05-24 NOTE — PRE PROCEDURE NOTE - PRE PROCEDURE EVALUATION
ORTHOPEDIC SURGERY PRE OP NOTE      Diagnosis: left FN fx    Planned Procedure: left total hip arthroplasty    Consent: TO BE OBTAINED BY ATTENDING                   Risks/benefits/alternatives were discussed with the patient/family and they wish to proceed with surgery.       ANTICIPATED DATE OF PROCEDURE :  5/25/23  SCHEDULED CASE OR ADD-ON CASE: add on      Consent:     Clearances:   [x] Medicine:       T(C): 37.4 (05-24-23 @ 15:20), Max: 37.4 (05-24-23 @ 15:20)  HR: 72 (05-24-23 @ 15:20) (52 - 77)  BP: 146/66 (05-24-23 @ 15:20) (111/68 - 171/69)  RR: 18 (05-24-23 @ 15:20) (18 - 18)  SpO2: 98% (05-24-23 @ 15:20) (92% - 98%)    Labs:                        13.9   9.27  )-----------( 160      ( 23 May 2023 14:52 )             40.5     05-23    137  |  101  |  17  ----------------------------<  113<H>  4.0   |  22  |  0.8    Ca    9.4      23 May 2023 14:52    TPro  6.9  /  Alb  4.4  /  TBili  0.4  /  DBili  x   /  AST  17  /  ALT  13  /  AlkPhos  71  05-23    PT/INR - ( 23 May 2023 14:52 )   PT: 12.00 sec;   INR: 1.05 ratio         PTT - ( 23 May 2023 14:52 )  PTT:27.3 sec  Type and Screen X 2:      [ ]Pregnancy test ( if female of childbearing age) : n/a  [ x]EKG:   [ x]CXR:       DIET: NPO after midnight  IVF: per primary team      ANTICOAGULATION STATUS ( include name of anticoagulant) :  [x] CONTINUE lovenox today   x] DISCONTINUE lovenox 5/25                                           A/P: Patient is a 78y y/o Female Pending left annmarie for FN fx tomorrow    [x ] -NPO and IVF @ midnight  [ x]pain control/analgesia prn per primary team   [ x]Incentive Spirometry   [x ]F/U Clearance  [ x]F/U Pending Labs  [ x]Notify Ortho with any questions- spectra [id number]    [ x]DISCUSSED WITH PRIMARY TEAM MEMBER (name of team member): Christina Gaona  [x ]Date and Time DISCUSSED WITH PRIMARY TEAM MEMBER: 5/24/23 18:15

## 2023-05-24 NOTE — PROGRESS NOTE ADULT - SUBJECTIVE AND OBJECTIVE BOX
Patient is a 78y old  Female who presents with a chief complaint of Left femoral neck fracture (24 May 2023 10:36)    Patient was seen and examined.  L hip hemiarthroplasty today  Pain controlled with pain meds  No new complaints  All systems reviewed.     PAST MEDICAL & SURGICAL HISTORY:  HTN (hypertension)  Hypercholesteremia  TIA (transient ischemic attack), 2016  Murmur  Cancer, BREAST   MASTECTOMY RIGHT  CHEMO [id number]  Breast CA Left -1/2021  Pre-diabetes  H/O right mastectomy 1989  S/P excision of lipoma  H/O left mastectomy 2021  History of lung biopsy 3/2021    Allergies  penicillins (Swelling)    MEDICATIONS  (STANDING):  aspirin  chewable 81 milliGRAM(s) Oral daily  enalapril 10 milliGRAM(s) Oral daily  lactated ringers. 1000 milliLiter(s) (75 mL/Hr) IV Continuous <Continuous>  metoprolol tartrate 25 milliGRAM(s) Oral daily  simvastatin 20 milliGRAM(s) Oral at bedtime    MEDICATIONS  (PRN):  acetaminophen     Tablet .. 650 milliGRAM(s) Oral every 6 hours PRN Temp greater or equal to 38C (100.4F), Mild Pain (1 - 3)  aluminum hydroxide/magnesium hydroxide/simethicone Suspension 30 milliLiter(s) Oral every 4 hours PRN Dyspepsia  melatonin 3 milliGRAM(s) Oral at bedtime PRN Insomnia  morphine  - Injectable 2 milliGRAM(s) IV Push every 4 hours PRN Severe Pain (7 - 10)  ondansetron Injectable 4 milliGRAM(s) IV Push every 8 hours PRN Nausea and/or Vomiting    Vital Signs Last 24 Hrs  T(C): 37.4  T(F): 99.3  HR: 72  BP: 146/66  BP(mean): --  RR: 18  SpO2: 98%    O/E:  Awake, alert, not in distress.  HEENT: atraumatic, EOMI.  Chest: clear.  CVS: SIS2 +, no murmur.  P/A: Soft, BS+  CNS: awake, alert  Ext: no edema feet. left hip tenderness  Skin: no rash, no ulcers.  All systems reviewed positive findings as above.                            13.9   9.27  )-----------( 160      ( 23 May 2023 14:52 )             40.5     05-23    137  |  101  |  17  ----------------------------<  113<H>  4.0   |  22  |  0.8    Ca    9.4      23 May 2023 14:52    TPro  6.9  /  Alb  4.4  /  TBili  0.4  /  DBili  x   /  AST  17  /  ALT  13  /  AlkPhos  71  05-23          PT/INR - ( 23 May 2023 14:52 )   PT: 12.00 sec;   INR: 1.05 ratio         PTT - ( 23 May 2023 14:52 )  PTT:27.3 sec

## 2023-05-25 LAB — BLD GP AB SCN SERPL QL: SIGNIFICANT CHANGE UP

## 2023-05-25 PROCEDURE — 99232 SBSQ HOSP IP/OBS MODERATE 35: CPT

## 2023-05-25 RX ORDER — SODIUM CHLORIDE 9 MG/ML
1000 INJECTION, SOLUTION INTRAVENOUS
Refills: 0 | Status: DISCONTINUED | OUTPATIENT
Start: 2023-05-26 | End: 2023-05-26

## 2023-05-25 RX ORDER — ENOXAPARIN SODIUM 100 MG/ML
40 INJECTION SUBCUTANEOUS ONCE
Refills: 0 | Status: COMPLETED | OUTPATIENT
Start: 2023-05-25 | End: 2023-05-25

## 2023-05-25 RX ORDER — CHLORHEXIDINE GLUCONATE 213 G/1000ML
1 SOLUTION TOPICAL
Refills: 0 | Status: DISCONTINUED | OUTPATIENT
Start: 2023-05-25 | End: 2023-05-26

## 2023-05-25 RX ADMIN — Medication 10 MILLIGRAM(S): at 05:33

## 2023-05-25 RX ADMIN — ENOXAPARIN SODIUM 40 MILLIGRAM(S): 100 INJECTION SUBCUTANEOUS at 17:04

## 2023-05-25 RX ADMIN — MORPHINE SULFATE 2 MILLIGRAM(S): 50 CAPSULE, EXTENDED RELEASE ORAL at 00:26

## 2023-05-25 RX ADMIN — Medication 25 MILLIGRAM(S): at 05:33

## 2023-05-25 RX ADMIN — SIMVASTATIN 20 MILLIGRAM(S): 20 TABLET, FILM COATED ORAL at 21:31

## 2023-05-25 RX ADMIN — Medication 650 MILLIGRAM(S): at 23:28

## 2023-05-25 NOTE — PROGRESS NOTE ADULT - SUBJECTIVE AND OBJECTIVE BOX
RUDY STINSON 78y Female  MRN#: [id number]   Hospital Day: 2d    SUBJECTIVE  Patient is a 78y old Female who presents with a chief complaint of Left femoral neck fracture (24 May 2023 17:23)  Currently admitted to medicine with the primary diagnosis of Hip fracture      INTERVAL HPI AND OVERNIGHT EVENTS:  Patient was examined and seen at bedside. This morning she is resting comfortably in bed and reports no issues or overnight events.    OBJECTIVE  PAST MEDICAL & SURGICAL HISTORY  HTN (hypertension)    Hypercholesteremia    TIA (transient ischemic attack)  2016    Murmur    Cancer  BREAST   MASTECTOMY RIGHT  CHEMO [id number]    Breast CA  Left -1/2021    Pre-diabetes    H/O right mastectomy  1989    S/P excision of lipoma    H/O left mastectomy  2021    History of lung biopsy  3/2021      ALLERGIES:  penicillins (Swelling)    MEDICATIONS:  STANDING MEDICATIONS  aspirin  chewable 81 milliGRAM(s) Oral daily  enalapril 10 milliGRAM(s) Oral daily  lactated ringers. 1000 milliLiter(s) IV Continuous <Continuous>  metoprolol tartrate 25 milliGRAM(s) Oral daily  simvastatin 20 milliGRAM(s) Oral at bedtime    PRN MEDICATIONS  acetaminophen     Tablet .. 650 milliGRAM(s) Oral every 6 hours PRN  aluminum hydroxide/magnesium hydroxide/simethicone Suspension 30 milliLiter(s) Oral every 4 hours PRN  melatonin 3 milliGRAM(s) Oral at bedtime PRN  morphine  - Injectable 2 milliGRAM(s) IV Push every 4 hours PRN  ondansetron Injectable 4 milliGRAM(s) IV Push every 8 hours PRN      VITAL SIGNS: Last 24 Hours  T(C): 37.8 (25 May 2023 09:06), Max: 37.9 (24 May 2023 23:43)  T(F): 100.1 (25 May 2023 09:06), Max: 100.3 (24 May 2023 23:43)  HR: 74 (25 May 2023 09:06) (72 - 90)  BP: 152/66 (25 May 2023 09:06) (134/64 - 152/66)  BP(mean): --  RR: 18 (24 May 2023 23:43) (18 - 18)  SpO2: 97% (24 May 2023 23:43) (97% - 98%)    LABS:                        14.1   10.10 )-----------( 151      ( 24 May 2023 20:00 )             41.4     05-24    133<L>  |  100  |  14  ----------------------------<  116<H>  4.6   |  22  |  0.8    Ca    8.9      24 May 2023 20:00    TPro  6.9  /  Alb  4.4  /  TBili  0.4  /  DBili  x   /  AST  17  /  ALT  13  /  AlkPhos  71  05-23    PT/INR - ( 24 May 2023 20:00 )   PT: 12.50 sec;   INR: 1.09 ratio         PTT - ( 24 May 2023 20:00 )  PTT:29.9 sec                  RADIOLOGY:      PHYSICAL EXAM:  CONSTITUTIONAL: No acute distress, well-developed, well-groomed, AAOx3  PULMONARY: Clear to auscultation bilaterally; no wheezes, rales, or rhonchi  CARDIOVASCULAR: Regular rate and rhythm; no murmurs, rubs, or gallops  GASTROINTESTINAL: Soft, non-tender, non-distended; bowel sounds present  MUSCULOSKELETAL: left hip pain upon palpation

## 2023-05-25 NOTE — PROGRESS NOTE ADULT - SUBJECTIVE AND OBJECTIVE BOX
Patient is a 78y old  Female who presents with a chief complaint of Left femoral neck fracture (24 May 2023 10:36)    Patient was seen and examined.  L hip hemiarthroplasty rescheduled for today  Pain controlled with pain meds  No new complaints  All systems reviewed.     PAST MEDICAL & SURGICAL HISTORY:  HTN (hypertension)  Hypercholesteremia  TIA (transient ischemic attack), 2016  Murmur  Cancer, BREAST   MASTECTOMY RIGHT  CHEMO [id number]  Breast CA Left -1/2021  Pre-diabetes  H/O right mastectomy 1989  S/P excision of lipoma  H/O left mastectomy 2021  History of lung biopsy 3/2021    Allergies  penicillins (Swelling)    MEDICATIONS  (STANDING):  aspirin  chewable 81 milliGRAM(s) Oral daily  enalapril 10 milliGRAM(s) Oral daily  lactated ringers. 1000 milliLiter(s) (75 mL/Hr) IV Continuous <Continuous>  metoprolol tartrate 25 milliGRAM(s) Oral daily  simvastatin 20 milliGRAM(s) Oral at bedtime    MEDICATIONS  (PRN):  acetaminophen     Tablet .. 650 milliGRAM(s) Oral every 6 hours PRN Temp greater or equal to 38C (100.4F), Mild Pain (1 - 3)  aluminum hydroxide/magnesium hydroxide/simethicone Suspension 30 milliLiter(s) Oral every 4 hours PRN Dyspepsia  melatonin 3 milliGRAM(s) Oral at bedtime PRN Insomnia  morphine  - Injectable 2 milliGRAM(s) IV Push every 4 hours PRN Severe Pain (7 - 10)  ondansetron Injectable 4 milliGRAM(s) IV Push every 8 hours PRN Nausea and/or Vomiting    T(C): 37.8 (05-25-23 @ 09:06), Max: 37.9 (05-24-23 @ 23:43)  HR: 74 (05-25-23 @ 09:06) (74 - 90)  BP: 152/66 (05-25-23 @ 09:06) (134/64 - 152/66)  RR: 18 (05-24-23 @ 23:43) (18 - 18)  SpO2: 97% (05-24-23 @ 23:43) (97% - 97%)    O/E:  Awake, alert, not in distress.  HEENT: atraumatic, EOMI.  Chest: clear.  CVS: SIS2 +, no murmur.  P/A: Soft, BS+  CNS: awake, alert  Ext: no edema feet. left hip tenderness  Skin: no rash, no ulcers.  All systems reviewed positive findings as above.                                  14.1   10.10 )-----------( 151      ( 24 May 2023 20:00 )             41.4   05-24    133<L>  |  100  |  14  ----------------------------<  116<H>  4.6   |  22  |  0.8    Ca    8.9      24 May 2023 20:00

## 2023-05-26 ENCOUNTER — RESULT REVIEW (OUTPATIENT)
Age: 78
End: 2023-05-26

## 2023-05-26 ENCOUNTER — TRANSCRIPTION ENCOUNTER (OUTPATIENT)
Age: 78
End: 2023-05-26

## 2023-05-26 LAB
ALBUMIN SERPL ELPH-MCNC: 3.6 G/DL — SIGNIFICANT CHANGE UP (ref 3.5–5.2)
ALP SERPL-CCNC: 67 U/L — SIGNIFICANT CHANGE UP (ref 30–115)
ALT FLD-CCNC: 12 U/L — SIGNIFICANT CHANGE UP (ref 0–41)
ANION GAP SERPL CALC-SCNC: 14 MMOL/L — SIGNIFICANT CHANGE UP (ref 7–14)
APPEARANCE UR: CLEAR — SIGNIFICANT CHANGE UP
AST SERPL-CCNC: 23 U/L — SIGNIFICANT CHANGE UP (ref 0–41)
B PERT IGG+IGM PNL SER: ABNORMAL
BASOPHILS # BLD AUTO: 0.02 K/UL — SIGNIFICANT CHANGE UP (ref 0–0.2)
BASOPHILS NFR BLD AUTO: 0.2 % — SIGNIFICANT CHANGE UP (ref 0–1)
BILIRUB SERPL-MCNC: 1 MG/DL — SIGNIFICANT CHANGE UP (ref 0.2–1.2)
BILIRUB UR-MCNC: NEGATIVE — SIGNIFICANT CHANGE UP
BLD GP AB SCN SERPL QL: SIGNIFICANT CHANGE UP
BUN SERPL-MCNC: 12 MG/DL — SIGNIFICANT CHANGE UP (ref 10–20)
CALCIUM SERPL-MCNC: 8.6 MG/DL — SIGNIFICANT CHANGE UP (ref 8.4–10.5)
CHLORIDE SERPL-SCNC: 98 MMOL/L — SIGNIFICANT CHANGE UP (ref 98–110)
CO2 SERPL-SCNC: 21 MMOL/L — SIGNIFICANT CHANGE UP (ref 17–32)
COLOR FLD: SIGNIFICANT CHANGE UP
COLOR SPEC: YELLOW — SIGNIFICANT CHANGE UP
CREAT SERPL-MCNC: 0.7 MG/DL — SIGNIFICANT CHANGE UP (ref 0.7–1.5)
DIFF PNL FLD: NEGATIVE — SIGNIFICANT CHANGE UP
EGFR: 89 ML/MIN/1.73M2 — SIGNIFICANT CHANGE UP
EOSINOPHIL # BLD AUTO: 0.12 K/UL — SIGNIFICANT CHANGE UP (ref 0–0.7)
EOSINOPHIL NFR BLD AUTO: 1.2 % — SIGNIFICANT CHANGE UP (ref 0–8)
FLUID INTAKE SUBSTANCE CLASS: SIGNIFICANT CHANGE UP
GLUCOSE SERPL-MCNC: 111 MG/DL — HIGH (ref 70–99)
GLUCOSE UR QL: NEGATIVE — SIGNIFICANT CHANGE UP
HCT VFR BLD CALC: 40 % — SIGNIFICANT CHANGE UP (ref 37–47)
HGB BLD-MCNC: 13.7 G/DL — SIGNIFICANT CHANGE UP (ref 12–16)
IMM GRANULOCYTES NFR BLD AUTO: 0.5 % — HIGH (ref 0.1–0.3)
KETONES UR-MCNC: ABNORMAL
LEUKOCYTE ESTERASE UR-ACNC: NEGATIVE — SIGNIFICANT CHANGE UP
LYMPHOCYTES # BLD AUTO: 1.38 K/UL — SIGNIFICANT CHANGE UP (ref 1.2–3.4)
LYMPHOCYTES # BLD AUTO: 13.3 % — LOW (ref 20.5–51.1)
LYMPHOCYTES # FLD: 11 — SIGNIFICANT CHANGE UP
MCHC RBC-ENTMCNC: 30.5 PG — SIGNIFICANT CHANGE UP (ref 27–31)
MCHC RBC-ENTMCNC: 34.3 G/DL — SIGNIFICANT CHANGE UP (ref 32–37)
MCV RBC AUTO: 89.1 FL — SIGNIFICANT CHANGE UP (ref 81–99)
MONOCYTES # BLD AUTO: 1.1 K/UL — HIGH (ref 0.1–0.6)
MONOCYTES NFR BLD AUTO: 10.6 % — HIGH (ref 1.7–9.3)
NEUTROPHILS # BLD AUTO: 7.67 K/UL — HIGH (ref 1.4–6.5)
NEUTROPHILS NFR BLD AUTO: 74.2 % — SIGNIFICANT CHANGE UP (ref 42.2–75.2)
NEUTROPHILS-BODY FLUID: 89 % — SIGNIFICANT CHANGE UP
NITRITE UR-MCNC: NEGATIVE — SIGNIFICANT CHANGE UP
NRBC # BLD: 0 /100 WBCS — SIGNIFICANT CHANGE UP (ref 0–0)
PH UR: 7 — SIGNIFICANT CHANGE UP (ref 5–8)
PLATELET # BLD AUTO: 144 K/UL — SIGNIFICANT CHANGE UP (ref 130–400)
PMV BLD: 11.7 FL — HIGH (ref 7.4–10.4)
POTASSIUM SERPL-MCNC: 4.1 MMOL/L — SIGNIFICANT CHANGE UP (ref 3.5–5)
POTASSIUM SERPL-SCNC: 4.1 MMOL/L — SIGNIFICANT CHANGE UP (ref 3.5–5)
PROT SERPL-MCNC: 6.2 G/DL — SIGNIFICANT CHANGE UP (ref 6–8)
PROT UR-MCNC: SIGNIFICANT CHANGE UP
RBC # BLD: 4.49 M/UL — SIGNIFICANT CHANGE UP (ref 4.2–5.4)
RBC # FLD: 13 % — SIGNIFICANT CHANGE UP (ref 11.5–14.5)
RCV VOL RI: 8000 /UL — HIGH (ref 0–0)
SODIUM SERPL-SCNC: 133 MMOL/L — LOW (ref 135–146)
SP GR SPEC: 1.02 — SIGNIFICANT CHANGE UP (ref 1.01–1.03)
SPECIMEN SOURCE FLD: SIGNIFICANT CHANGE UP
TOTAL NUCLEATED CELL COUNT, BODY FLUID: 9787 /UL — SIGNIFICANT CHANGE UP
TUBE TYPE: SIGNIFICANT CHANGE UP
UROBILINOGEN FLD QL: SIGNIFICANT CHANGE UP
WBC # BLD: 10.34 K/UL — SIGNIFICANT CHANGE UP (ref 4.8–10.8)
WBC # FLD AUTO: 10.34 K/UL — SIGNIFICANT CHANGE UP (ref 4.8–10.8)

## 2023-05-26 PROCEDURE — 73560 X-RAY EXAM OF KNEE 1 OR 2: CPT | Mod: 26,RT

## 2023-05-26 PROCEDURE — 88305 TISSUE EXAM BY PATHOLOGIST: CPT | Mod: 26

## 2023-05-26 PROCEDURE — 72170 X-RAY EXAM OF PELVIS: CPT | Mod: 26

## 2023-05-26 PROCEDURE — 27236 TREAT THIGH FRACTURE: CPT | Mod: LT

## 2023-05-26 PROCEDURE — 93970 EXTREMITY STUDY: CPT | Mod: 26

## 2023-05-26 PROCEDURE — 99232 SBSQ HOSP IP/OBS MODERATE 35: CPT

## 2023-05-26 PROCEDURE — 88311 DECALCIFY TISSUE: CPT | Mod: 26

## 2023-05-26 PROCEDURE — 71045 X-RAY EXAM CHEST 1 VIEW: CPT | Mod: 26

## 2023-05-26 RX ORDER — ENOXAPARIN SODIUM 100 MG/ML
40 INJECTION SUBCUTANEOUS EVERY 24 HOURS
Refills: 0 | Status: DISCONTINUED | OUTPATIENT
Start: 2023-05-26 | End: 2023-06-05

## 2023-05-26 RX ORDER — OXYCODONE HYDROCHLORIDE 5 MG/1
5 TABLET ORAL ONCE
Refills: 0 | Status: DISCONTINUED | OUTPATIENT
Start: 2023-05-26 | End: 2023-05-30

## 2023-05-26 RX ORDER — ACETAMINOPHEN 500 MG
650 TABLET ORAL EVERY 6 HOURS
Refills: 0 | Status: DISCONTINUED | OUTPATIENT
Start: 2023-05-26 | End: 2023-06-07

## 2023-05-26 RX ORDER — METOPROLOL TARTRATE 50 MG
25 TABLET ORAL DAILY
Refills: 0 | Status: DISCONTINUED | OUTPATIENT
Start: 2023-05-26 | End: 2023-06-07

## 2023-05-26 RX ORDER — SIMVASTATIN 20 MG/1
20 TABLET, FILM COATED ORAL AT BEDTIME
Refills: 0 | Status: DISCONTINUED | OUTPATIENT
Start: 2023-05-26 | End: 2023-06-07

## 2023-05-26 RX ORDER — HYDROMORPHONE HYDROCHLORIDE 2 MG/ML
0.5 INJECTION INTRAMUSCULAR; INTRAVENOUS; SUBCUTANEOUS
Refills: 0 | Status: DISCONTINUED | OUTPATIENT
Start: 2023-05-26 | End: 2023-05-26

## 2023-05-26 RX ORDER — LANOLIN ALCOHOL/MO/W.PET/CERES
3 CREAM (GRAM) TOPICAL AT BEDTIME
Refills: 0 | Status: DISCONTINUED | OUTPATIENT
Start: 2023-05-26 | End: 2023-06-07

## 2023-05-26 RX ORDER — CEFAZOLIN SODIUM 1 G
2000 VIAL (EA) INJECTION EVERY 8 HOURS
Refills: 0 | Status: COMPLETED | OUTPATIENT
Start: 2023-05-26 | End: 2023-05-27

## 2023-05-26 RX ORDER — SODIUM CHLORIDE 9 MG/ML
1000 INJECTION, SOLUTION INTRAVENOUS
Refills: 0 | Status: DISCONTINUED | OUTPATIENT
Start: 2023-05-26 | End: 2023-05-26

## 2023-05-26 RX ORDER — OXYCODONE HYDROCHLORIDE 5 MG/1
5 TABLET ORAL ONCE
Refills: 0 | Status: DISCONTINUED | OUTPATIENT
Start: 2023-05-26 | End: 2023-05-26

## 2023-05-26 RX ORDER — ONDANSETRON 8 MG/1
4 TABLET, FILM COATED ORAL ONCE
Refills: 0 | Status: DISCONTINUED | OUTPATIENT
Start: 2023-05-26 | End: 2023-05-26

## 2023-05-26 RX ORDER — ASPIRIN/CALCIUM CARB/MAGNESIUM 324 MG
81 TABLET ORAL DAILY
Refills: 0 | Status: DISCONTINUED | OUTPATIENT
Start: 2023-05-26 | End: 2023-06-07

## 2023-05-26 RX ORDER — ONDANSETRON 8 MG/1
4 TABLET, FILM COATED ORAL ONCE
Refills: 0 | Status: DISCONTINUED | OUTPATIENT
Start: 2023-05-26 | End: 2023-05-30

## 2023-05-26 RX ORDER — ONDANSETRON 8 MG/1
4 TABLET, FILM COATED ORAL EVERY 8 HOURS
Refills: 0 | Status: DISCONTINUED | OUTPATIENT
Start: 2023-05-26 | End: 2023-06-07

## 2023-05-26 RX ORDER — MORPHINE SULFATE 50 MG/1
2 CAPSULE, EXTENDED RELEASE ORAL EVERY 4 HOURS
Refills: 0 | Status: DISCONTINUED | OUTPATIENT
Start: 2023-05-26 | End: 2023-05-26

## 2023-05-26 RX ORDER — SODIUM CHLORIDE 9 MG/ML
1000 INJECTION, SOLUTION INTRAVENOUS
Refills: 0 | Status: DISCONTINUED | OUTPATIENT
Start: 2023-05-26 | End: 2023-05-27

## 2023-05-26 RX ADMIN — Medication 25 MILLIGRAM(S): at 05:30

## 2023-05-26 RX ADMIN — CHLORHEXIDINE GLUCONATE 1 APPLICATION(S): 213 SOLUTION TOPICAL at 05:31

## 2023-05-26 RX ADMIN — Medication 10 MILLIGRAM(S): at 05:30

## 2023-05-26 NOTE — PROGRESS NOTE ADULT - SUBJECTIVE AND OBJECTIVE BOX
RUDY STINSON 78y Female  MRN#: [id number]   Hospital Day: 3d    SUBJECTIVE  Patient is a 78y old Female who presents with a chief complaint of Left femoral neck fracture (25 May 2023 15:20)  Currently admitted to medicine with the primary diagnosis of Hip fracture      INTERVAL HPI AND OVERNIGHT EVENTS:  Patient was examined and seen at bedside. This morning she is resting comfortably in bed and reports no issues or overnight events.    OBJECTIVE  PAST MEDICAL & SURGICAL HISTORY  HTN (hypertension)    Hypercholesteremia    TIA (transient ischemic attack)      Murmur    Cancer  BREAST   MASTECTOMY RIGHT  CHEMO [id number]    Breast CA  Left -2021    Pre-diabetes    H/O right mastectomy      S/P excision of lipoma    H/O left mastectomy      History of lung biopsy  3/2021      ALLERGIES:  penicillins (Swelling)    MEDICATIONS:  STANDING MEDICATIONS  aspirin  chewable 81 milliGRAM(s) Oral daily  chlorhexidine 2% Cloths 1 Application(s) Topical <User Schedule>  enalapril 10 milliGRAM(s) Oral daily  lactated ringers. 1000 milliLiter(s) IV Continuous <Continuous>  metoprolol tartrate 25 milliGRAM(s) Oral daily  simvastatin 20 milliGRAM(s) Oral at bedtime    PRN MEDICATIONS  acetaminophen     Tablet .. 650 milliGRAM(s) Oral every 6 hours PRN  aluminum hydroxide/magnesium hydroxide/simethicone Suspension 30 milliLiter(s) Oral every 4 hours PRN  melatonin 3 milliGRAM(s) Oral at bedtime PRN  morphine  - Injectable 2 milliGRAM(s) IV Push every 4 hours PRN  ondansetron Injectable 4 milliGRAM(s) IV Push every 8 hours PRN      VITAL SIGNS: Last 24 Hours  T(C): 37.3 (26 May 2023 08:14), Max: 38.1 (25 May 2023 23:36)  T(F): 99.2 (26 May 2023 08:14), Max: 100.6 (25 May 2023 23:36)  HR: 74 (26 May 2023 08:14) (74 - 92)  BP: 147/70 (26 May 2023 08:14) (124/64 - 160/70)  BP(mean): --  RR: 18 (25 May 2023 23:36) (18 - 18)  SpO2: 96% (25 May 2023 23:36) (95% - 96%)    LABS:                        13.7   10.34 )-----------( 144      ( 26 May 2023 07:01 )             40.0         133<L>  |  98  |  12  ----------------------------<  111<H>  4.1   |  21  |  0.7    Ca    8.6      26 May 2023 07:01    TPro  6.2  /  Alb  3.6  /  TBili  1.0  /  DBili  x   /  AST  23  /  ALT  12  /  AlkPhos  67      PT/INR - ( 24 May 2023 20:00 )   PT: 12.50 sec;   INR: 1.09 ratio         PTT - ( 24 May 2023 20:00 )  PTT:29.9 sec  Urinalysis Basic - ( 25 May 2023 23:45 )    Color: Yellow / Appearance: Clear / S.018 / pH: x  Gluc: x / Ketone: Small  / Bili: Negative / Urobili: <2 mg/dL   Blood: x / Protein: Trace / Nitrite: Negative   Leuk Esterase: Negative / RBC: x / WBC x   Sq Epi: x / Non Sq Epi: x / Bacteria: x                    PHYSICAL EXAM:  CONSTITUTIONAL: No acute distress, well-developed, well-groomed, AAOx3  PULMONARY: Clear to auscultation bilaterally; no wheezes, rales, or rhonchi  CARDIOVASCULAR: Regular rate and rhythm; no murmurs, rubs, or gallops  GASTROINTESTINAL: Soft, non-tender, non-distended; bowel sounds present  MUSCULOSKELETAL: left hip pain upon palpation

## 2023-05-26 NOTE — BRIEF OPERATIVE NOTE - OPERATION/FINDINGS
Displaced femoral neck fracture, left  DePuy Synthes [place], cemented  Size 4 femur, Size 44/28 bipolar head, +5 standard offset  2 packets simplex cement, cement restrictor Displaced femoral neck fracture, left  Above fracture; postop WBAT with anterolateral hip precautions; Abx and DVT ppx per protocol  Dict: [id number]  Implanted: DePuy Synthes [place], cemented with Size 4 femur standard offset, Bipolar head Size 5/28/44  2 packets Simplex PMMA without Abx; cement restrictor

## 2023-05-26 NOTE — PROGRESS NOTE ADULT - SUBJECTIVE AND OBJECTIVE BOX
Patient is a 78y old  Female who presents with a chief complaint of Left femoral neck fracture (24 May 2023 10:36)    Patient was seen and examined.  L hip hemiarthroplasty rescheduled for today  Febrile last night  Pain controlled with pain meds  All systems reviewed.     PAST MEDICAL & SURGICAL HISTORY:  HTN (hypertension)  Hypercholesteremia  TIA (transient ischemic attack), 2016  Murmur  Cancer, BREAST   MASTECTOMY RIGHT  CHEMO [id number]  Breast CA Left -1/2021  Pre-diabetes  H/O right mastectomy 1989  S/P excision of lipoma  H/O left mastectomy 2021  History of lung biopsy 3/2021    Allergies  penicillins (Swelling)    MEDICATIONS  (STANDING):  aspirin  chewable 81 milliGRAM(s) Oral daily  chlorhexidine 2% Cloths 1 Application(s) Topical <User Schedule>  enalapril 10 milliGRAM(s) Oral daily  lactated ringers. 1000 milliLiter(s) (75 mL/Hr) IV Continuous <Continuous>  metoprolol tartrate 25 milliGRAM(s) Oral daily  simvastatin 20 milliGRAM(s) Oral at bedtime    MEDICATIONS  (PRN):  acetaminophen     Tablet .. 650 milliGRAM(s) Oral every 6 hours PRN Temp greater or equal to 38C (100.4F), Mild Pain (1 - 3)  aluminum hydroxide/magnesium hydroxide/simethicone Suspension 30 milliLiter(s) Oral every 4 hours PRN Dyspepsia  melatonin 3 milliGRAM(s) Oral at bedtime PRN Insomnia  morphine  - Injectable 2 milliGRAM(s) IV Push every 4 hours PRN Severe Pain (7 - 10)  ondansetron Injectable 4 milliGRAM(s) IV Push every 8 hours PRN Nausea and/or Vomiting    T(C): 37.3 (05-26-23 @ 08:14), Max: 38.1 (05-25-23 @ 23:36)  HR: 74 (05-26-23 @ 08:14) (74 - 92)  BP: 147/70 (05-26-23 @ 08:14) (124/64 - 160/70)  RR: 18 (05-26-23 @ 00:38) (18 - 18)  SpO2: 100% (05-26-23 @ 00:38) (96% - 100%)    O/E:  Awake, alert, not in distress.  HEENT: atraumatic, EOMI.  Chest: clear.  CVS: SIS2 +, no murmur.  P/A: Soft, BS+  CNS: awake, alert  Ext: no edema feet. left hip tenderness  Skin: no rash, no ulcers.  All systems reviewed positive findings as above.                                  13.7   10.34 )-----------( 144      ( 26 May 2023 07:01 )             40.0                         14.1   10.10 )-----------( 151      ( 24 May 2023 20:00 )             41.4   05-26    133<L>  |  98  |  12  ----------------------------<  111<H>  4.1   |  21  |  0.7  05-24    133<L>  |  100  |  14  ----------------------------<  116<H>  4.6   |  22  |  0.8    Ca    8.6      26 May 2023 07:01  Ca    8.9      24 May 2023 20:00    TPro  6.2  /  Alb  3.6  /  TBili  1.0  /  DBili  x   /  AST  23  /  ALT  12  /  AlkPhos  67  05-26

## 2023-05-26 NOTE — BRIEF OPERATIVE NOTE - NSICDXBRIEFPROCEDURE_GEN_ALL_CORE_FT
PROCEDURES:  ORIF, fracture, femoral neck 26-May-2023 22:44:31 with cemented hemiarthroplasty, CPT code 95954 Eliel James

## 2023-05-27 LAB
ALBUMIN SERPL ELPH-MCNC: 3.1 G/DL — LOW (ref 3.5–5.2)
ALP SERPL-CCNC: 66 U/L — SIGNIFICANT CHANGE UP (ref 30–115)
ALT FLD-CCNC: 13 U/L — SIGNIFICANT CHANGE UP (ref 0–41)
ANION GAP SERPL CALC-SCNC: 13 MMOL/L — SIGNIFICANT CHANGE UP (ref 7–14)
AST SERPL-CCNC: 32 U/L — SIGNIFICANT CHANGE UP (ref 0–41)
BASOPHILS # BLD AUTO: 0.01 K/UL — SIGNIFICANT CHANGE UP (ref 0–0.2)
BASOPHILS NFR BLD AUTO: 0.1 % — SIGNIFICANT CHANGE UP (ref 0–1)
BILIRUB SERPL-MCNC: 0.4 MG/DL — SIGNIFICANT CHANGE UP (ref 0.2–1.2)
BUN SERPL-MCNC: 24 MG/DL — HIGH (ref 10–20)
CALCIUM SERPL-MCNC: 7.9 MG/DL — LOW (ref 8.4–10.5)
CHLORIDE SERPL-SCNC: 98 MMOL/L — SIGNIFICANT CHANGE UP (ref 98–110)
CO2 SERPL-SCNC: 22 MMOL/L — SIGNIFICANT CHANGE UP (ref 17–32)
CREAT SERPL-MCNC: 0.7 MG/DL — SIGNIFICANT CHANGE UP (ref 0.7–1.5)
EGFR: 89 ML/MIN/1.73M2 — SIGNIFICANT CHANGE UP
EOSINOPHIL # BLD AUTO: 0 K/UL — SIGNIFICANT CHANGE UP (ref 0–0.7)
EOSINOPHIL NFR BLD AUTO: 0 % — SIGNIFICANT CHANGE UP (ref 0–8)
GLUCOSE SERPL-MCNC: 147 MG/DL — HIGH (ref 70–99)
GRAM STN FLD: SIGNIFICANT CHANGE UP
HCT VFR BLD CALC: 34.1 % — LOW (ref 37–47)
HCT VFR BLD CALC: 38.4 % — SIGNIFICANT CHANGE UP (ref 37–47)
HGB BLD-MCNC: 11.8 G/DL — LOW (ref 12–16)
HGB BLD-MCNC: 13.2 G/DL — SIGNIFICANT CHANGE UP (ref 12–16)
IMM GRANULOCYTES NFR BLD AUTO: 0.4 % — HIGH (ref 0.1–0.3)
LYMPHOCYTES # BLD AUTO: 0.93 K/UL — LOW (ref 1.2–3.4)
LYMPHOCYTES # BLD AUTO: 7.4 % — LOW (ref 20.5–51.1)
MCHC RBC-ENTMCNC: 31.2 PG — HIGH (ref 27–31)
MCHC RBC-ENTMCNC: 31.2 PG — HIGH (ref 27–31)
MCHC RBC-ENTMCNC: 34.4 G/DL — SIGNIFICANT CHANGE UP (ref 32–37)
MCHC RBC-ENTMCNC: 34.6 G/DL — SIGNIFICANT CHANGE UP (ref 32–37)
MCV RBC AUTO: 90.2 FL — SIGNIFICANT CHANGE UP (ref 81–99)
MCV RBC AUTO: 90.8 FL — SIGNIFICANT CHANGE UP (ref 81–99)
MONOCYTES # BLD AUTO: 1.06 K/UL — HIGH (ref 0.1–0.6)
MONOCYTES NFR BLD AUTO: 8.4 % — SIGNIFICANT CHANGE UP (ref 1.7–9.3)
NEUTROPHILS # BLD AUTO: 10.58 K/UL — HIGH (ref 1.4–6.5)
NEUTROPHILS NFR BLD AUTO: 83.7 % — HIGH (ref 42.2–75.2)
NRBC # BLD: 0 /100 WBCS — SIGNIFICANT CHANGE UP (ref 0–0)
NRBC # BLD: 0 /100 WBCS — SIGNIFICANT CHANGE UP (ref 0–0)
PLATELET # BLD AUTO: 151 K/UL — SIGNIFICANT CHANGE UP (ref 130–400)
PLATELET # BLD AUTO: 165 K/UL — SIGNIFICANT CHANGE UP (ref 130–400)
PMV BLD: 11.3 FL — HIGH (ref 7.4–10.4)
PMV BLD: 11.6 FL — HIGH (ref 7.4–10.4)
POTASSIUM SERPL-MCNC: 4.6 MMOL/L — SIGNIFICANT CHANGE UP (ref 3.5–5)
POTASSIUM SERPL-SCNC: 4.6 MMOL/L — SIGNIFICANT CHANGE UP (ref 3.5–5)
PROT SERPL-MCNC: 5.7 G/DL — LOW (ref 6–8)
RAPID RVP RESULT: SIGNIFICANT CHANGE UP
RBC # BLD: 3.78 M/UL — LOW (ref 4.2–5.4)
RBC # BLD: 4.23 M/UL — SIGNIFICANT CHANGE UP (ref 4.2–5.4)
RBC # FLD: 12.8 % — SIGNIFICANT CHANGE UP (ref 11.5–14.5)
RBC # FLD: 12.8 % — SIGNIFICANT CHANGE UP (ref 11.5–14.5)
SARS-COV-2 RNA SPEC QL NAA+PROBE: SIGNIFICANT CHANGE UP
SODIUM SERPL-SCNC: 133 MMOL/L — LOW (ref 135–146)
SPECIMEN SOURCE: SIGNIFICANT CHANGE UP
SYNOVIAL CRYSTALS CLARITY: ABNORMAL
SYNOVIAL CRYSTALS COLOR: ABNORMAL
SYNOVIAL CRYSTALS ID: ABNORMAL
SYNOVIAL CRYSTALS TUBE: SIGNIFICANT CHANGE UP
WBC # BLD: 12.63 K/UL — HIGH (ref 4.8–10.8)
WBC # BLD: 18.18 K/UL — HIGH (ref 4.8–10.8)
WBC # FLD AUTO: 12.63 K/UL — HIGH (ref 4.8–10.8)
WBC # FLD AUTO: 18.18 K/UL — HIGH (ref 4.8–10.8)

## 2023-05-27 PROCEDURE — 99232 SBSQ HOSP IP/OBS MODERATE 35: CPT

## 2023-05-27 PROCEDURE — 71045 X-RAY EXAM CHEST 1 VIEW: CPT | Mod: 26

## 2023-05-27 RX ORDER — IBUPROFEN 200 MG
400 TABLET ORAL EVERY 8 HOURS
Refills: 0 | Status: DISCONTINUED | OUTPATIENT
Start: 2023-05-27 | End: 2023-05-27

## 2023-05-27 RX ORDER — PANTOPRAZOLE SODIUM 20 MG/1
40 TABLET, DELAYED RELEASE ORAL
Refills: 0 | Status: DISCONTINUED | OUTPATIENT
Start: 2023-05-27 | End: 2023-06-07

## 2023-05-27 RX ADMIN — SODIUM CHLORIDE 75 MILLILITER(S): 9 INJECTION, SOLUTION INTRAVENOUS at 12:51

## 2023-05-27 RX ADMIN — Medication 81 MILLIGRAM(S): at 12:52

## 2023-05-27 RX ADMIN — Medication 100 MILLIGRAM(S): at 21:39

## 2023-05-27 RX ADMIN — ENOXAPARIN SODIUM 40 MILLIGRAM(S): 100 INJECTION SUBCUTANEOUS at 00:21

## 2023-05-27 RX ADMIN — SODIUM CHLORIDE 75 MILLILITER(S): 9 INJECTION, SOLUTION INTRAVENOUS at 00:31

## 2023-05-27 RX ADMIN — Medication 100 MILLIGRAM(S): at 15:23

## 2023-05-27 RX ADMIN — PANTOPRAZOLE SODIUM 40 MILLIGRAM(S): 20 TABLET, DELAYED RELEASE ORAL at 10:52

## 2023-05-27 RX ADMIN — Medication 100 MILLIGRAM(S): at 06:04

## 2023-05-27 RX ADMIN — SIMVASTATIN 20 MILLIGRAM(S): 20 TABLET, FILM COATED ORAL at 21:38

## 2023-05-27 RX ADMIN — Medication 650 MILLIGRAM(S): at 22:35

## 2023-05-27 RX ADMIN — Medication 25 MILLIGRAM(S): at 06:04

## 2023-05-27 RX ADMIN — Medication 10 MILLIGRAM(S): at 06:04

## 2023-05-27 NOTE — OCCUPATIONAL THERAPY INITIAL EVALUATION ADULT - GENERAL OBSERVATIONS, REHAB EVAL
Pt encountered reclined in bed, + IV, + prima fit, + bilateral sequentials. Pt agreeable to bedside OT assessment, may be seen as confirmed with RN. Pt returned to bedside recliner, + IV, + chair alarm

## 2023-05-27 NOTE — PROGRESS NOTE ADULT - SUBJECTIVE AND OBJECTIVE BOX
Patient is a 78y old  Female who presents with a chief complaint of Left femoral neck fracture (24 May 2023 10:36)    Patient was seen and examined.  S/p L hip hemiarthroplasty on 5/26  low grade temp yesterday  Pain controlled with pain meds  All systems reviewed.     PAST MEDICAL & SURGICAL HISTORY:  HTN (hypertension)  Hypercholesteremia  TIA (transient ischemic attack), 2016  Murmur  Cancer, BREAST   MASTECTOMY RIGHT  CHEMO [id number]  Breast CA Left -1/2021  Pre-diabetes  H/O right mastectomy 1989  S/P excision of lipoma  H/O left mastectomy 2021  History of lung biopsy 3/2021    Allergies  penicillins (Swelling)    MEDICATIONS  (STANDING):  aspirin  chewable 81 milliGRAM(s) Oral daily  ceFAZolin   IVPB 2000 milliGRAM(s) IV Intermittent every 8 hours  enalapril 10 milliGRAM(s) Oral daily  enoxaparin Injectable 40 milliGRAM(s) SubCutaneous every 24 hours  lactated ringers. 1000 milliLiter(s) (75 mL/Hr) IV Continuous <Continuous>  metoprolol tartrate 25 milliGRAM(s) Oral daily  pantoprazole    Tablet 40 milliGRAM(s) Oral before breakfast  simvastatin 20 milliGRAM(s) Oral at bedtime    MEDICATIONS  (PRN):  acetaminophen     Tablet .. 650 milliGRAM(s) Oral every 6 hours PRN Temp greater or equal to 38C (100.4F), Mild Pain (1 - 3)  aluminum hydroxide/magnesium hydroxide/simethicone Suspension 30 milliLiter(s) Oral every 4 hours PRN Dyspepsia  HYDROmorphone  Injectable 0.5 milliGRAM(s) IV Push every 10 minutes PRN Severe Pain (7 - 10)  melatonin 3 milliGRAM(s) Oral at bedtime PRN Insomnia  morphine  - Injectable 2 milliGRAM(s) IV Push every 4 hours PRN Severe Pain (7 - 10)  ondansetron Injectable 4 milliGRAM(s) IV Push once PRN Nausea and/or Vomiting  ondansetron Injectable 4 milliGRAM(s) IV Push every 8 hours PRN Nausea and/or Vomiting  oxyCODONE    IR 5 milliGRAM(s) Oral once PRN Moderate Pain (4 - 6)    T(C): 36.9 (05-27-23 @ 08:00), Max: 37.8 (05-26-23 @ 15:20)  HR: 59 (05-27-23 @ 08:00) (59 - 84)  BP: 102/54 (05-27-23 @ 08:00) (99/51 - 167/76)  RR: 18 (05-27-23 @ 08:00) (12 - 18)  SpO2: 98% (05-27-23 @ 08:00) (94% - 98%)    O/E:  Awake, alert, not in distress.  HEENT: atraumatic, EOMI.  Chest: clear.  CVS: SIS2 +, no murmur.  P/A: Soft, BS+  CNS: awake, alert  Ext: no edema feet. left hip dressing+  All systems reviewed positive findings as above.                                11.8<L>  12.63<H> )-----------( 151      ( 27 May 2023 11:36 )             34.1<L>                        13.2   18.18<H> )-----------( 165      ( 27 May 2023 00:10 )             38.4   05-27    133<L>  |  98  |  24<H>  ----------------------------<  147<H>  4.6   |  22  |  0.7  05-26    133<L>  |  98  |  12  ----------------------------<  111<H>  4.1   |  21  |  0.7    Ca    7.9<L>      27 May 2023 11:36  Ca    8.6      26 May 2023 07:01    TPro  5.7<L>  /  Alb  3.1<L>  /  TBili  0.4  /  DBili  x   /  AST  32  /  ALT  13  /  AlkPhos  66  05-27  TPro  6.2  /  Alb  3.6  /  TBili  1.0  /  DBili  x   /  AST  23  /  ALT  12  /  AlkPhos  67  05-26

## 2023-05-27 NOTE — OCCUPATIONAL THERAPY INITIAL EVALUATION ADULT - ADDITIONAL COMMENTS
Pt reports living in private ranch style home with 5 ALLISON and 1 FOS to basement laundry. + bathtub, spouse is the

## 2023-05-27 NOTE — PROGRESS NOTE ADULT - SUBJECTIVE AND OBJECTIVE BOX
RUDY STINSON 78y Female  MRN#: [id number]   Hospital Day: 4d    SUBJECTIVE  Patient is a 78y old Female who presents with a chief complaint of Left femoral neck fracture (27 May 2023 09:23)  Currently admitted to medicine with the primary diagnosis of Hip fracture      INTERVAL HPI AND OVERNIGHT EVENTS:  Patient was examined and seen at bedside. This morning she is resting comfortably in bed and reports no issues or overnight events.    OBJECTIVE  PAST MEDICAL & SURGICAL HISTORY  HTN (hypertension)    Hypercholesteremia    TIA (transient ischemic attack)  2016    Murmur    Cancer  BREAST   MASTECTOMY RIGHT  CHEMO [id number]    Breast CA  Left -2021    Pre-diabetes    H/O right mastectomy      S/P excision of lipoma    H/O left mastectomy      History of lung biopsy  3/2021      ALLERGIES:  penicillins (Swelling)    MEDICATIONS:  STANDING MEDICATIONS  aspirin  chewable 81 milliGRAM(s) Oral daily  ceFAZolin   IVPB 2000 milliGRAM(s) IV Intermittent every 8 hours  enalapril 10 milliGRAM(s) Oral daily  enoxaparin Injectable 40 milliGRAM(s) SubCutaneous every 24 hours  lactated ringers. 1000 milliLiter(s) IV Continuous <Continuous>  metoprolol tartrate 25 milliGRAM(s) Oral daily  simvastatin 20 milliGRAM(s) Oral at bedtime    PRN MEDICATIONS  acetaminophen     Tablet .. 650 milliGRAM(s) Oral every 6 hours PRN  aluminum hydroxide/magnesium hydroxide/simethicone Suspension 30 milliLiter(s) Oral every 4 hours PRN  HYDROmorphone  Injectable 0.5 milliGRAM(s) IV Push every 10 minutes PRN  ibuprofen  Suspension. 400 milliGRAM(s) Oral every 8 hours PRN  melatonin 3 milliGRAM(s) Oral at bedtime PRN  morphine  - Injectable 2 milliGRAM(s) IV Push every 4 hours PRN  ondansetron Injectable 4 milliGRAM(s) IV Push once PRN  ondansetron Injectable 4 milliGRAM(s) IV Push every 8 hours PRN  oxyCODONE    IR 5 milliGRAM(s) Oral once PRN      VITAL SIGNS: Last 24 Hours  T(C): 36.9 (27 May 2023 08:00), Max: 37.8 (26 May 2023 15:20)  T(F): 98.5 (27 May 2023 08:00), Max: 100.1 (26 May 2023 15:20)  HR: 59 (27 May 2023 08:00) (59 - 84)  BP: 102/54 (27 May 2023 08:00) (99/51 - 167/76)  BP(mean): 73 (27 May 2023 01:50) (73 - 73)  RR: 18 (27 May 2023 08:00) (12 - 18)  SpO2: 98% (27 May 2023 08:00) (94% - 98%)    LABS:                        13.2   18.18 )-----------( 165      ( 27 May 2023 00:10 )             38.4         133<L>  |  98  |  12  ----------------------------<  111<H>  4.1   |  21  |  0.7    Ca    8.6      26 May 2023 07:01    TPro  6.2  /  Alb  3.6  /  TBili  1.0  /  DBili  x   /  AST  23  /  ALT  12  /  AlkPhos  67  -      Urinalysis Basic - ( 25 May 2023 23:45 )    Color: Yellow / Appearance: Clear / S.018 / pH: x  Gluc: x / Ketone: Small  / Bili: Negative / Urobili: <2 mg/dL   Blood: x / Protein: Trace / Nitrite: Negative   Leuk Esterase: Negative / RBC: x / WBC x   Sq Epi: x / Non Sq Epi: x / Bacteria: x                        PHYSICAL EXAM:  CONSTITUTIONAL: No acute distress, well-developed, well-groomed, AAOx3  PULMONARY: Clear to auscultation bilaterally; no wheezes, rales, or rhonchi  CARDIOVASCULAR: Regular rate and rhythm; no murmurs, rubs, or gallops  GASTROINTESTINAL: Soft, non-tender, non-distended; bowel sounds present  MUSCULOSKELETAL: surgery site noted

## 2023-05-27 NOTE — PHYSICAL THERAPY INITIAL EVALUATION ADULT - SPECIFY REASON(S)
Hold PT at this time. Patient's family arrived at bedside during session. Pt. education provided on f/u visit for PT eval. Will f/u with PT as appropriate.

## 2023-05-27 NOTE — PROGRESS NOTE ADULT - SUBJECTIVE AND OBJECTIVE BOX
ORTHOPEDIC POST-OP CHECK    Subjective: POD0 s/p L hip hemiarthroplasty. Seen and examined at bedside. Doing well, pain controlled. Denies fevers, numbness/tingling. No other complaints.    MEDICATIONS  (STANDING):  aspirin  chewable 81 milliGRAM(s) Oral daily  ceFAZolin   IVPB 2000 milliGRAM(s) IV Intermittent every 8 hours  enalapril 10 milliGRAM(s) Oral daily  enoxaparin Injectable 40 milliGRAM(s) SubCutaneous every 24 hours  lactated ringers. 1000 milliLiter(s) (75 mL/Hr) IV Continuous <Continuous>  metoprolol tartrate 25 milliGRAM(s) Oral daily  simvastatin 20 milliGRAM(s) Oral at bedtime    MEDICATIONS  (PRN):  acetaminophen     Tablet .. 650 milliGRAM(s) Oral every 6 hours PRN Temp greater or equal to 38C (100.4F), Mild Pain (1 - 3)  aluminum hydroxide/magnesium hydroxide/simethicone Suspension 30 milliLiter(s) Oral every 4 hours PRN Dyspepsia  HYDROmorphone  Injectable 0.5 milliGRAM(s) IV Push every 10 minutes PRN Severe Pain (7 - 10)  melatonin 3 milliGRAM(s) Oral at bedtime PRN Insomnia  morphine  - Injectable 2 milliGRAM(s) IV Push every 4 hours PRN Severe Pain (7 - 10)  ondansetron Injectable 4 milliGRAM(s) IV Push every 8 hours PRN Nausea and/or Vomiting  ondansetron Injectable 4 milliGRAM(s) IV Push once PRN Nausea and/or Vomiting  oxyCODONE    IR 5 milliGRAM(s) Oral once PRN Moderate Pain (4 - 6)      Objective:  T(C): 37.5 (05-27-23 @ 01:50), Max: 37.8 (05-26-23 @ 15:20)  HR: 83 (05-27-23 @ 01:50) (74 - 92)  BP: 105/57 (05-27-23 @ 01:50) (99/51 - 167/76)  RR: 15 (05-27-23 @ 01:50) (12 - 18)  SpO2: 95% (05-27-23 @ 01:50) (94% - 96%)    Physical Exam:    LLE:  Dressing c/d/i  Thigh soft and compressible  SILT s/s/sp/dp/t  Motor intact TA/EHL/GS  Digits [facility]    Labs:                        13.2   18.18 )-----------( 165      ( 27 May 2023 00:10 )             38.4     05-26    133<L>  |  98  |  12  ----------------------------<  111<H>  4.1   |  21  |  0.7    Ca    8.6      26 May 2023 07:01    TPro  6.2  /  Alb  3.6  /  TBili  1.0  /  DBili  x   /  AST  23  /  ALT  12  /  AlkPhos  67  05-26      A/P: 78yFemale POD0 s/p L hip hemiarthroplasty on 5/26/23, doing well.    - Activity: WBAT LLE  - Abx: Ancef 2g q8hr x3 doses for a total of 24hrs post-operatively, ordered (received ancef preop with no reaction)  - DVT PPx: LVX/SQH per primary team, include SCDs  - Pain control  - IS encouraged  - AM labs  - PT/Rehab  - D/C planning    - If discharged, patient should follow up with Dr. James at [address] [facility] phone [phone] in 2 weeks  - Patient should be discharged on 6 weeks (from surgery date) of Aspirin 325mg daily for DVT prophylaxis as their mobility/function/ambulation will be decreased due to lower extremity orthopaedic surgery.

## 2023-05-27 NOTE — PROGRESS NOTE ADULT - SUBJECTIVE AND OBJECTIVE BOX
ORTHOPEDIC PROGRESS NOTE    Subjective: POD1 s/p L hip hemiarthroplasty. Seen and examined at bedside. Doing well, pain controlled. Denies fevers, numbness/tingling. No other complaints.      MEDICATIONS  (STANDING):  aspirin  chewable 81 milliGRAM(s) Oral daily  ceFAZolin   IVPB 2000 milliGRAM(s) IV Intermittent every 8 hours  enalapril 10 milliGRAM(s) Oral daily  enoxaparin Injectable 40 milliGRAM(s) SubCutaneous every 24 hours  lactated ringers. 1000 milliLiter(s) (75 mL/Hr) IV Continuous <Continuous>  metoprolol tartrate 25 milliGRAM(s) Oral daily  simvastatin 20 milliGRAM(s) Oral at bedtime    MEDICATIONS  (PRN):  acetaminophen     Tablet .. 650 milliGRAM(s) Oral every 6 hours PRN Temp greater or equal to 38C (100.4F), Mild Pain (1 - 3)  aluminum hydroxide/magnesium hydroxide/simethicone Suspension 30 milliLiter(s) Oral every 4 hours PRN Dyspepsia  HYDROmorphone  Injectable 0.5 milliGRAM(s) IV Push every 10 minutes PRN Severe Pain (7 - 10)  ibuprofen  Suspension. 400 milliGRAM(s) Oral every 8 hours PRN Moderate Pain (4 - 6)  melatonin 3 milliGRAM(s) Oral at bedtime PRN Insomnia  morphine  - Injectable 2 milliGRAM(s) IV Push every 4 hours PRN Severe Pain (7 - 10)  ondansetron Injectable 4 milliGRAM(s) IV Push every 8 hours PRN Nausea and/or Vomiting  ondansetron Injectable 4 milliGRAM(s) IV Push once PRN Nausea and/or Vomiting  oxyCODONE    IR 5 milliGRAM(s) Oral once PRN Moderate Pain (4 - 6)      Objective:  Vital Signs Last 24 Hrs  T(C): 36.9 (27 May 2023 08:00), Max: 37.8 (26 May 2023 15:20)  T(F): 98.5 (27 May 2023 08:00), Max: 100.1 (26 May 2023 15:20)  HR: 59 (27 May 2023 08:00) (59 - 84)  BP: 102/54 (27 May 2023 08:00) (99/51 - 167/76)  BP(mean): 73 (27 May 2023 01:50) (73 - 73)  RR: 18 (27 May 2023 08:00) (12 - 18)  SpO2: 98% (27 May 2023 08:00) (94% - 98%)    Parameters below as of 27 May 2023 08:00  Patient On (Oxygen Delivery Method): nasal cannula  O2 Flow (L/min): 2      Physical Exam:    LLE:  Dressing c/d/i  Thigh soft and compressible  SILT s/s/sp/dp/t  Motor intact TA/EHL/GS  Digits [facility]    Labs:                        13.2   18.18 )-----------( 165      ( 27 May 2023 00:10 )             38.4     05-26    133<L>  |  98  |  12  ----------------------------<  111<H>  4.1   |  21  |  0.7    Ca    8.6      26 May 2023 07:01    TPro  6.2  /  Alb  3.6  /  TBili  1.0  /  DBili  x   /  AST  23  /  ALT  12  /  AlkPhos  67  05-26      A/P: 78yFemale POD1 s/p L hip hemiarthroplasty on 5/26/23, doing well.    - Activity: WBAT LLE  - Abx: Ancef 2g q8hr x3 doses for a total of 24hrs post-operatively, ordered (received ancef preop with no reaction) - to be completed today  - DVT PPx: LVX/SQH per primary team, include SCDs  - Pain control  - IS encouraged  - AM labs  - PT/Rehab  - D/C planning    - If discharged, patient should follow up with Dr. James at [address] [facility] phone [phone] in 2 weeks  - Patient should be discharged on 6 weeks (from surgery date) of Aspirin 325mg daily for DVT prophylaxis as their mobility/function/ambulation will be decreased due to lower extremity orthopaedic surgery. ORTHOPEDIC PROGRESS NOTE    Subjective: POD1 s/p L hip hemiarthroplasty and R knee arthrocentesis w/ CSI. Seen and examined at bedside. Doing well, pain controlled. Denies fevers, numbness/tingling. No other complaints. Improved R knee pain.      MEDICATIONS  (STANDING):  aspirin  chewable 81 milliGRAM(s) Oral daily  ceFAZolin   IVPB 2000 milliGRAM(s) IV Intermittent every 8 hours  enalapril 10 milliGRAM(s) Oral daily  enoxaparin Injectable 40 milliGRAM(s) SubCutaneous every 24 hours  lactated ringers. 1000 milliLiter(s) (75 mL/Hr) IV Continuous <Continuous>  metoprolol tartrate 25 milliGRAM(s) Oral daily  simvastatin 20 milliGRAM(s) Oral at bedtime    MEDICATIONS  (PRN):  acetaminophen     Tablet .. 650 milliGRAM(s) Oral every 6 hours PRN Temp greater or equal to 38C (100.4F), Mild Pain (1 - 3)  aluminum hydroxide/magnesium hydroxide/simethicone Suspension 30 milliLiter(s) Oral every 4 hours PRN Dyspepsia  HYDROmorphone  Injectable 0.5 milliGRAM(s) IV Push every 10 minutes PRN Severe Pain (7 - 10)  ibuprofen  Suspension. 400 milliGRAM(s) Oral every 8 hours PRN Moderate Pain (4 - 6)  melatonin 3 milliGRAM(s) Oral at bedtime PRN Insomnia  morphine  - Injectable 2 milliGRAM(s) IV Push every 4 hours PRN Severe Pain (7 - 10)  ondansetron Injectable 4 milliGRAM(s) IV Push every 8 hours PRN Nausea and/or Vomiting  ondansetron Injectable 4 milliGRAM(s) IV Push once PRN Nausea and/or Vomiting  oxyCODONE    IR 5 milliGRAM(s) Oral once PRN Moderate Pain (4 - 6)      Objective:  Vital Signs Last 24 Hrs  T(C): 36.9 (27 May 2023 08:00), Max: 37.8 (26 May 2023 15:20)  T(F): 98.5 (27 May 2023 08:00), Max: 100.1 (26 May 2023 15:20)  HR: 59 (27 May 2023 08:00) (59 - 84)  BP: 102/54 (27 May 2023 08:00) (99/51 - 167/76)  BP(mean): 73 (27 May 2023 01:50) (73 - 73)  RR: 18 (27 May 2023 08:00) (12 - 18)  SpO2: 98% (27 May 2023 08:00) (94% - 98%)    Parameters below as of 27 May 2023 08:00  Patient On (Oxygen Delivery Method): nasal cannula  O2 Flow (L/min): 2      Physical Exam:    LLE:  Dressing c/d/i  Thigh soft and compressible  SILT s/s/sp/dp/t  Motor intact TA/EHL/GS  Digits wwp    Labs:                        13.2   18.18 )-----------( 165      ( 27 May 2023 00:10 )             38.4     05-26    133<L>  |  98  |  12  ----------------------------<  111<H>  4.1   |  21  |  0.7    Ca    8.6      26 May 2023 07:01    TPro  6.2  /  Alb  3.6  /  TBili  1.0  /  DBili  x   /  AST  23  /  ALT  12  /  AlkPhos  67  05-26      A/P: 78yFemale POD1 s/p L hip hemiarthroplasty and R knee arthrocentesis + CSI, on 5/26/23, doing well.    - Activity: WBAT LLE  - Abx: Ancef 2g q8hr x3 doses for a total of 24hrs post-operatively, ordered (received ancef preop with no reaction) - to be completed today  - DVT PPx: LVX/SQH per primary team, include SCDs  - F/U arthrocentesis aspiration results, cell count, crystals, gram stain, and culture  - Pain control  - IS encouraged  - AM labs  - PT/Rehab  - D/C planning    - If discharged, patient should follow up with Dr. James at [facility] phone [phone] in 2 weeks  - Patient should be discharged on 6 weeks (from surgery date) of Aspirin 325mg daily for DVT prophylaxis as their mobility/function/ambulation will be decreased due to lower extremity orthopaedic surgery.

## 2023-05-27 NOTE — OCCUPATIONAL THERAPY INITIAL EVALUATION ADULT - PERTINENT HX OF CURRENT PROBLEM, REHAB EVAL
77 y/o F with PMHx of HTN, HLD, TIA, breast cancer (s/p bilateral mastectomy, in remission) presents with pain in left hip s/p mechanical ground level fall this afternoon. Patient reports she tripped and fell while at home in her bedroom while trying to put on her pants. Reports head trauma but denies LOC. Reports left hip pain, but denies pain elsewhere. Does not use an assistive device at baseline. Denies hip pain prior to fall. Reports she can walk multiple blocks with no pain. Patient denies any fever, chills, headache, dizziness. Patient denies chest pain, palpitation, SOB. Patient denies abdominal pain, n/v/d/c  POD1 s/p L hip hemiarthroplasty and R knee arthrocentesis w/ CSI. Seen and examined at bedside. Doing well, pain controlled. Denies fevers, numbness/tingling. No other complaints. Improved R knee pain

## 2023-05-28 LAB
ALBUMIN SERPL ELPH-MCNC: 3 G/DL — LOW (ref 3.5–5.2)
ALP SERPL-CCNC: 90 U/L — SIGNIFICANT CHANGE UP (ref 30–115)
ALT FLD-CCNC: 30 U/L — SIGNIFICANT CHANGE UP (ref 0–41)
ANION GAP SERPL CALC-SCNC: 8 MMOL/L — SIGNIFICANT CHANGE UP (ref 7–14)
AST SERPL-CCNC: 55 U/L — HIGH (ref 0–41)
BASOPHILS # BLD AUTO: 0.03 K/UL — SIGNIFICANT CHANGE UP (ref 0–0.2)
BASOPHILS NFR BLD AUTO: 0.3 % — SIGNIFICANT CHANGE UP (ref 0–1)
BILIRUB SERPL-MCNC: 0.5 MG/DL — SIGNIFICANT CHANGE UP (ref 0.2–1.2)
BUN SERPL-MCNC: 22 MG/DL — HIGH (ref 10–20)
CALCIUM SERPL-MCNC: 8 MG/DL — LOW (ref 8.4–10.5)
CHLORIDE SERPL-SCNC: 101 MMOL/L — SIGNIFICANT CHANGE UP (ref 98–110)
CO2 SERPL-SCNC: 23 MMOL/L — SIGNIFICANT CHANGE UP (ref 17–32)
CREAT SERPL-MCNC: 0.7 MG/DL — SIGNIFICANT CHANGE UP (ref 0.7–1.5)
EGFR: 89 ML/MIN/1.73M2 — SIGNIFICANT CHANGE UP
EOSINOPHIL # BLD AUTO: 0.07 K/UL — SIGNIFICANT CHANGE UP (ref 0–0.7)
EOSINOPHIL NFR BLD AUTO: 0.6 % — SIGNIFICANT CHANGE UP (ref 0–8)
GLUCOSE SERPL-MCNC: 121 MG/DL — HIGH (ref 70–99)
HCT VFR BLD CALC: 31.4 % — LOW (ref 37–47)
HGB BLD-MCNC: 10.9 G/DL — LOW (ref 12–16)
IMM GRANULOCYTES NFR BLD AUTO: 0.6 % — HIGH (ref 0.1–0.3)
LYMPHOCYTES # BLD AUTO: 1.72 K/UL — SIGNIFICANT CHANGE UP (ref 1.2–3.4)
LYMPHOCYTES # BLD AUTO: 15.9 % — LOW (ref 20.5–51.1)
MCHC RBC-ENTMCNC: 31.2 PG — HIGH (ref 27–31)
MCHC RBC-ENTMCNC: 34.7 G/DL — SIGNIFICANT CHANGE UP (ref 32–37)
MCV RBC AUTO: 90 FL — SIGNIFICANT CHANGE UP (ref 81–99)
MONOCYTES # BLD AUTO: 1.32 K/UL — HIGH (ref 0.1–0.6)
MONOCYTES NFR BLD AUTO: 12.2 % — HIGH (ref 1.7–9.3)
NEUTROPHILS # BLD AUTO: 7.63 K/UL — HIGH (ref 1.4–6.5)
NEUTROPHILS NFR BLD AUTO: 70.4 % — SIGNIFICANT CHANGE UP (ref 42.2–75.2)
NRBC # BLD: 0 /100 WBCS — SIGNIFICANT CHANGE UP (ref 0–0)
PLATELET # BLD AUTO: 146 K/UL — SIGNIFICANT CHANGE UP (ref 130–400)
PMV BLD: 11 FL — HIGH (ref 7.4–10.4)
POTASSIUM SERPL-MCNC: 4.4 MMOL/L — SIGNIFICANT CHANGE UP (ref 3.5–5)
POTASSIUM SERPL-SCNC: 4.4 MMOL/L — SIGNIFICANT CHANGE UP (ref 3.5–5)
PROT SERPL-MCNC: 5.4 G/DL — LOW (ref 6–8)
RBC # BLD: 3.49 M/UL — LOW (ref 4.2–5.4)
RBC # FLD: 13.2 % — SIGNIFICANT CHANGE UP (ref 11.5–14.5)
SODIUM SERPL-SCNC: 132 MMOL/L — LOW (ref 135–146)
WBC # BLD: 10.84 K/UL — HIGH (ref 4.8–10.8)
WBC # FLD AUTO: 10.84 K/UL — HIGH (ref 4.8–10.8)

## 2023-05-28 PROCEDURE — 99232 SBSQ HOSP IP/OBS MODERATE 35: CPT

## 2023-05-28 RX ORDER — SENNA PLUS 8.6 MG/1
2 TABLET ORAL AT BEDTIME
Refills: 0 | Status: DISCONTINUED | OUTPATIENT
Start: 2023-05-28 | End: 2023-05-30

## 2023-05-28 RX ADMIN — Medication 81 MILLIGRAM(S): at 11:58

## 2023-05-28 RX ADMIN — SENNA PLUS 2 TABLET(S): 8.6 TABLET ORAL at 21:35

## 2023-05-28 RX ADMIN — ENOXAPARIN SODIUM 40 MILLIGRAM(S): 100 INJECTION SUBCUTANEOUS at 23:25

## 2023-05-28 RX ADMIN — Medication 10 MILLIGRAM(S): at 06:16

## 2023-05-28 RX ADMIN — PANTOPRAZOLE SODIUM 40 MILLIGRAM(S): 20 TABLET, DELAYED RELEASE ORAL at 06:17

## 2023-05-28 RX ADMIN — ENOXAPARIN SODIUM 40 MILLIGRAM(S): 100 INJECTION SUBCUTANEOUS at 00:20

## 2023-05-28 RX ADMIN — Medication 5 MILLIGRAM(S): at 11:58

## 2023-05-28 RX ADMIN — Medication 25 MILLIGRAM(S): at 06:17

## 2023-05-28 RX ADMIN — SIMVASTATIN 20 MILLIGRAM(S): 20 TABLET, FILM COATED ORAL at 21:36

## 2023-05-28 NOTE — CONSULT NOTE ADULT - ASSESSMENT
IMPRESSION: Rehabilitation for left femoral neck fracture/hemiarthroplasty    PRECAUTIONS: [  ] Cardiac  [  ] Respiratory  [  ] Seizures [  ] Contact Precautions  [  ] Droplet Isolation  [  ] Other:      Weight Bearing Status:  WBAT LLE    RECOMMENDATION:    Out of bed to chair     DVT/decubitus ulcer prophylaxis    REHABILITATION PLAN:     [ X  ] Bedside PT 3-5 times a week   [  X ]   Bedside OT  2-3 times a week             [   ] No Rehab Therapy Indicated                   [   ]  Speech Therapy   Conditioning/ROM                                    ADL  Bed Mobility                                               Conditioning/ROM  Transfers                                                     Bed Mobility  Sitting /Standing Balance                         Transfers                                        Gait Training                                               Sitting/Standing Balance  Stair Training  [ X  ] Applicable                    Home Equipment Evaluation                                                                        Splinting  [   ] Only      GOALS:   ADL   [ X  ]   Independent                    Transfers  [ X  ] Independent                          Ambulation  [ X  ] Independent     [  X  ] With device                            [   ]  CG                                                         [   ]  CG                                                                  [   ] CG                            [    ] Min A                                                   [   ] Min A                                                              [   ] Min  A          DISCHARGE PLAN:  [    ]  Good candidate for Intensive Rehabilitation/Hospital-based [facility]A [facility]                                             Will tolerate 3 hours of Intensive Rehab Daily                                       [    ]  Short Term Rehabilitation in Skilled Nursing Facility                                       [    ]  Home with Outpatient or  services                                         [  X  ]  Possible Candidate for Intensive Hospital-Based Rehabilitation      Thank you. IMPRESSION: Rehabilitation for left femoral neck fracture/hemiarthroplasty, s/p right knee arthrocentesis, pseudogout    PRECAUTIONS: [  ] Cardiac  [  ] Respiratory  [  ] Seizures [  ] Contact Precautions  [  ] Droplet Isolation  [  ] Other:      Weight Bearing Status:  WBAT LLE    RECOMMENDATION:    Out of bed to chair     DVT/decubitus ulcer prophylaxis    REHABILITATION PLAN:     [ X  ] Bedside PT 3-5 times a week   [  X ]   Bedside OT  2-3 times a week             [   ] No Rehab Therapy Indicated                   [   ]  Speech Therapy   Conditioning/ROM                                    ADL  Bed Mobility                                               Conditioning/ROM  Transfers                                                     Bed Mobility  Sitting /Standing Balance                         Transfers                                        Gait Training                                               Sitting/Standing Balance  Stair Training  [ X  ] Applicable                    Home Equipment Evaluation                                                                        Splinting  [   ] Only      GOALS:   ADL   [ X  ]   Independent                    Transfers  [ X  ] Independent                          Ambulation  [ X  ] Independent     [  X  ] With device                            [   ]  CG                                                         [   ]  CG                                                                  [   ] CG                            [    ] Min A                                                   [   ] Min A                                                              [   ] Min  A          DISCHARGE PLAN:  [    ]  Good candidate for Intensive Rehabilitation/Hospital-based [facility]A [facility]                                             Will tolerate 3 hours of Intensive Rehab Daily                                       [    ]  Short Term Rehabilitation in Skilled Nursing Facility                                       [    ]  Home with Outpatient or  services                                         [  X  ]  Possible Candidate for Intensive Hospital-Based Rehabilitation      Thank you.

## 2023-05-28 NOTE — PROGRESS NOTE ADULT - SUBJECTIVE AND OBJECTIVE BOX
SUBJECTIVE: Patient seen and examined. Pain controlled.  Pt did well o/n  No f/c/n/v/cp/sob.     OBJECTIVE:  NAD  Vital Signs Last 24 Hrs  T(C): 37.4 (28 May 2023 00:12), Max: 37.4 (28 May 2023 00:12)  T(F): 99.4 (28 May 2023 00:12), Max: 99.4 (28 May 2023 00:12)  HR: 82 (28 May 2023 05:00) (59 - 90)  BP: 132/61 (28 May 2023 05:00) (102/54 - 135/63)  BP(mean): --  RR: 18 (28 May 2023 00:12) (18 - 18)  SpO2: 95% (28 May 2023 00:12) (95% - 98%)    Parameters below as of 27 May 2023 15:20  Patient On (Oxygen Delivery Method): nasal cannula        Affected extremity:   LLE         Aquacel Dressing: clean/dry/intact            Sensation: SILT         Motor exam: 5/5 TA/GS/EHL         warm well perfused; capillary refill <3 seconds                         11.8   12.63 )-----------( 151      ( 27 May 2023 11:36 )             34.1     05-27    133<L>  |  98  |  24<H>  ----------------------------<  147<H>  4.6   |  22  |  0.7    Ca    7.9<L>      27 May 2023 11:36    TPro  5.7<L>  /  Alb  3.1<L>  /  TBili  0.4  /  DBili  x   /  AST  32  /  ALT  13  /  AlkPhos  66  05-27    A/P : 78yFemale POD2 s/p L hip hemiarthroplasty and R knee arthrocentesis + CSI, on 5/26/23, doing well.  Cleared for discharge from an orthopedic standpoint    - Rheumatology consult for treatment of Pseudogout   - Activity: WBAT LLE  - DVT PPx: LVX/SQH per primary team, include SCDs  - Arthrocentesis positive for CPPD crystals   - Pain control  - IS encouraged  - AM labs  - PT/Rehab  - D/C planning    - If discharged, patient should follow up with Dr. James at [facility] phone [phone] in 2 weeks  - Patient should be discharged on 6 weeks (from surgery date) of Aspirin 325mg daily for DVT prophylaxis as their mobility/function/ambulation will be decreased due to lower extremity orthopaedic surgery.

## 2023-05-28 NOTE — PROGRESS NOTE ADULT - SUBJECTIVE AND OBJECTIVE BOX
Patient is a 78y old  Female who presents with a chief complaint of Left femoral neck fracture (24 May 2023 10:36)    Patient was seen and examined.  S/p L hip hemiarthroplasty on 5/26  Pain controlled with pain meds  c/o constipation  All systems reviewed.     PAST MEDICAL & SURGICAL HISTORY:  HTN (hypertension)  Hypercholesteremia  TIA (transient ischemic attack), 2016  Murmur  Cancer, BREAST   MASTECTOMY RIGHT  CHEMO [id number]  Breast CA Left -1/2021  Pre-diabetes  H/O right mastectomy 1989  S/P excision of lipoma  H/O left mastectomy 2021  History of lung biopsy 3/2021    Allergies  penicillins (Swelling)    MEDICATIONS  (STANDING):  aspirin  chewable 81 milliGRAM(s) Oral daily  bisacodyl 5 milliGRAM(s) Oral daily  enalapril 10 milliGRAM(s) Oral daily  enoxaparin Injectable 40 milliGRAM(s) SubCutaneous every 24 hours  metoprolol tartrate 25 milliGRAM(s) Oral daily  pantoprazole    Tablet 40 milliGRAM(s) Oral before breakfast  simvastatin 20 milliGRAM(s) Oral at bedtime    MEDICATIONS  (PRN):  acetaminophen     Tablet .. 650 milliGRAM(s) Oral every 6 hours PRN Temp greater or equal to 38C (100.4F), Mild Pain (1 - 3)  aluminum hydroxide/magnesium hydroxide/simethicone Suspension 30 milliLiter(s) Oral every 4 hours PRN Dyspepsia  HYDROmorphone  Injectable 0.5 milliGRAM(s) IV Push every 10 minutes PRN Severe Pain (7 - 10)  melatonin 3 milliGRAM(s) Oral at bedtime PRN Insomnia  morphine  - Injectable 2 milliGRAM(s) IV Push every 4 hours PRN Severe Pain (7 - 10)  ondansetron Injectable 4 milliGRAM(s) IV Push every 8 hours PRN Nausea and/or Vomiting  ondansetron Injectable 4 milliGRAM(s) IV Push once PRN Nausea and/or Vomiting  oxyCODONE    IR 5 milliGRAM(s) Oral once PRN Moderate Pain (4 - 6)    T(C): 37.7 (05-28-23 @ 15:20), Max: 37.7 (05-28-23 @ 15:20)  HR: 92 (05-28-23 @ 15:20) (76 - 92)  BP: 120/58 (05-28-23 @ 15:20) (120/58 - 135/63)  RR: 18 (05-28-23 @ 15:20) (18 - 18)  SpO2: 93% (05-28-23 @ 15:20) (93% - 98%)    O/E:  Awake, alert, not in distress.  HEENT: atraumatic, EOMI.  Chest: clear.  CVS: SIS2 +, no murmur.  P/A: Soft, BS+  CNS: awake, alert  Ext: no edema feet. left hip dressing+  All systems reviewed positive findings as above.                          10.9<L>  10.84<H> )-----------( 146      ( 28 May 2023 04:30 )             31.4<L>                        11.8<L>  12.63<H> )-----------( 151      ( 27 May 2023 11:36 )             34.1<L>  05-28    132<L>  |  101  |  22<H>  ----------------------------<  121<H>  4.4   |  23  |  0.7  05-27    133<L>  |  98  |  24<H>  ----------------------------<  147<H>  4.6   |  22  |  0.7    Ca    8.0<L>      28 May 2023 04:30  Ca    7.9<L>      27 May 2023 11:36    TPro  5.4<L>  /  Alb  3.0<L>  /  TBili  0.5  /  DBili  x   /  AST  55<H>  /  ALT  30  /  AlkPhos  90  05-28  TPro  5.7<L>  /  Alb  3.1<L>  /  TBili  0.4  /  DBili  x   /  AST  32  /  ALT  13  /  AlkPhos  66  05-27

## 2023-05-28 NOTE — CONSULT NOTE ADULT - SUBJECTIVE AND OBJECTIVE BOX
HPI:  This is a 79 y/o right-handed F with PMHx of HTN, HLD, TIA, breast cancer (s/p bilateral mastectomy, in remission) presents with pain in left hip s/p mechanical ground level fall this afternoon. Patient reports she tripped and fell while at home in her bedroom while trying to put on her pants. Reports head trauma but denies LOC. Reports left hip pain, but denies pain elsewhere. Does not use an assistive device at baseline. Denies hip pain prior to fall. Reports she can walk multiple blocks with no pain. Patient denies any fever, chills, headache, dizziness. Patient denies chest pain, palpitation, SOB. Patient denies abdominal pain, n/v/d/c.    ED Vitals   HR: 87 (05-23-23 @ 18:03) (80 - 93)  BP: 139/84 (05-23-23 @ 18:03) (139/84 - 204/93)  RR: 18 (05-23-23 @ 18:03) (17 - 18)  SpO2: 99% (05-23-23 @ 18:03) (96% - 99%)    Labs significant for Hb 13.9, HCT 40.5, WBC 9.27, , TPro  6.9  /  Alb  4.4  /  TBili  0.4  /  DBili  x   /  AST  17  /  ALT  13  /  AlkPhos  71  05-23    137  |  101  |  17  ----------------------------<  113<H>  4.0   |  22  |  0.8    X-ray of pelvis:  left femoral neck fracture with superior placement of the femoral shaft. The bones are diffusely demineralized.                       (23 May 2023 18:50)      PAST MEDICAL & SURGICAL HISTORY:  HTN (hypertension)      Hypercholesteremia      TIA (transient ischemic attack)  2016      Murmur      Cancer  BREAST   MASTECTOMY RIGHT  CHEMO [id number]      Breast CA  Left -1/2021      Pre-diabetes      H/O right mastectomy  1989      S/P excision of lipoma      H/O left mastectomy  2021      History of lung biopsy  3/2021          HOSPITAL COURSE:  ·  PRE-OP DIAGNOSIS:  Left displaced femoral neck fracture 26-May-2023 22:45:03  Michael James  ·  POST-OP DIAGNOSIS:  Left displaced femoral neck fracture 26-May-2023 22:45:08  Michael James  ·  PROCEDURES:  ORIF, fracture, femoral neck 26-May-2023 22:44:31 with cemented hemiarthroplasty, CPT code 28850 Michael James      Operative Findings:  · Operative Findings	Displaced femoral neck fracture, left  [employer] [place], cemented  Size 4 femur, Size 44/28 bipolar head, +5 standard offset  2 packets simplex cement, cement restrictor      TODAY'S SUBJECTIVE & REVIEW OF SYMPTOMS:    Constitutional WNL  Cardiac WNL   Respiratory WNL  GI WNL   WNL  Endocrine WNL  Skin WNL  Musculoskeletal WNL  Neuro WNL  Cognitive WNL  Psych WNL      MEDICATIONS  (STANDING):  aspirin  chewable 81 milliGRAM(s) Oral daily  bisacodyl 5 milliGRAM(s) Oral daily  enalapril 10 milliGRAM(s) Oral daily  enoxaparin Injectable 40 milliGRAM(s) SubCutaneous every 24 hours  metoprolol tartrate 25 milliGRAM(s) Oral daily  pantoprazole    Tablet 40 milliGRAM(s) Oral before breakfast  simvastatin 20 milliGRAM(s) Oral at bedtime    MEDICATIONS  (PRN):  acetaminophen     Tablet .. 650 milliGRAM(s) Oral every 6 hours PRN Temp greater or equal to 38C (100.4F), Mild Pain (1 - 3)  aluminum hydroxide/magnesium hydroxide/simethicone Suspension 30 milliLiter(s) Oral every 4 hours PRN Dyspepsia  HYDROmorphone  Injectable 0.5 milliGRAM(s) IV Push every 10 minutes PRN Severe Pain (7 - 10)  melatonin 3 milliGRAM(s) Oral at bedtime PRN Insomnia  morphine  - Injectable 2 milliGRAM(s) IV Push every 4 hours PRN Severe Pain (7 - 10)  ondansetron Injectable 4 milliGRAM(s) IV Push once PRN Nausea and/or Vomiting  ondansetron Injectable 4 milliGRAM(s) IV Push every 8 hours PRN Nausea and/or Vomiting  oxyCODONE    IR 5 milliGRAM(s) Oral once PRN Moderate Pain (4 - 6)      FAMILY HISTORY:  Family history of breast cancer (Mother)    Family history of rectal cancer (Father)  AND CHF        Allergies    penicillins (Swelling)    Intolerances        SOCIAL HISTORY:    [  ] Smoking  [  ] EtOH  [  ] Substance abuse     Home Environment:  [  ] Alone  [ X ] Lives with Family--  [  ] Home Health Aide    Dwelling:  [ X ] Private House  [  ] Apartment  [  ] Adult Home/Assisted Living Facility  [  ] Skilled Nursing Facility      [  ] Short Term  [  ] Long Term  [ X ] Stairs  5 step entry and basement steps to laundry     Elevator [  ]    FUNCTIONAL STATUS PTA: (Check all that apply)  Ambulation: [  X ]Independent    [  ] Dependent     [  ] Non-Ambulatory  Assistive Device: [  ] Straight Cane  [  ]  Quad Cane  [  ] Walker  [  ]  Wheelchair  ADL: [ X ] Independent  [  ]  Dependent       Vital Signs Last 24 Hrs  T(C): 37.3 (28 May 2023 08:00), Max: 37.4 (28 May 2023 00:12)  T(F): 99.2 (28 May 2023 08:00), Max: 99.4 (28 May 2023 00:12)  HR: 76 (28 May 2023 08:00) (76 - 90)  BP: 132/63 (28 May 2023 08:00) (121/56 - 135/63)  BP(mean): --  RR: 18 (28 May 2023 08:00) (18 - 18)  SpO2: 95% (28 May 2023 00:12) (95% - 96%)    Parameters below as of 27 May 2023 15:20  Patient On (Oxygen Delivery Method): nasal cannula          PHYSICAL EXAM: Alert and oriented X 4  GENERAL: Well-developed, well-nourished, in NAD  HEAD:  Normocephalic, atraumatic  EYES: EOMI, PERRLA, sclerae anicteric, conjunctivae not injected  NECK: Supple, No JVD, Normal thyroid  CHEST/LUNG: Clear to auscultation bilaterally; No rales, rhonchi, wheezing  HEART: Regular rate and rhythm; No murmurs, gallops, or rubs  ABDOMEN: Soft, nontender, nondistended; Bowel sounds present  EXTREMITIES:  + left hip dressing, 2+ Peripheral pulses; No clubbing, cyanosis, or edema    NERVOUS SYSTEM:  Cranial Nerves II-XII intact [X  ] Abnormal  [  ]  ROM: WFL all extremities [  ]  Abnormal [X  ]  Motor Strength: WFL all extremities  [  ]  Abnormal [X  ]  Sensation: intact to light touch [  ] Abnormal [  ]  Reflexes: Symmetric [  ]  Abnormal [  ]    FUNCTIONAL STATUS:  Bed Mobility: Independent [  ]  Supervision [  ]  Needs Assistance [ X ]  N/A [  ]  Transfers: Independent [  ]  Supervision [  ]  Needs Assistance [ X ]  N/A [  ]   Ambulation: Independent [  ]  Supervision [  ]  Needs Assistance [ X ]  N/A [  ]  ADLs: Independent [  ] Requires Assistance [ X ] N/A [  ]      LABS:                        10.9   10.84 )-----------( 146      ( 28 May 2023 04:30 )             31.4     05-28    132<L>  |  101  |  22<H>  ----------------------------<  121<H>  4.4   |  23  |  0.7    Ca    8.0<L>      28 May 2023 04:30    TPro  5.4<L>  /  Alb  3.0<L>  /  TBili  0.5  /  DBili  x   /  AST  55<H>  /  ALT  30  /  AlkPhos  90  05-28          RADIOLOGY & ADDITIONAL STUDIES:  < from: Xray Chest 1 View-PORTABLE IMMEDIATE (Xray Chest 1 View-PORTABLE IMMEDIATE .) (05.27.23 @ 00:18) >    ACC: [id number] EXAM:  XR CHEST PORTABLE IMMED 1V   ORDERED BY: GLENDY SQUIRES     PROCEDURE DATE:  05/27/2023          INTERPRETATION:  Clinical History / Reason for exam: Pneumonia    Comparison : Chest radiograph May 26, 2023. Time 5:50 AM    Technique/Positioning: Frontal.    Findings:    Support devices: Stable positioning.    Cardiac/mediastinum/hilum: Cardiomegaly, thoracic aortic calcification.    Lung parenchyma/Pleura: Within normal limits.    Skeleton/soft tissues: Stable. Right axillary surgical clips.    Impression:    No radiographic evidence of acute cardiopulmonary disease. Stable   cardiomegaly        --- End of Report ---    < end of copied text >  < from: Xray Pelvis AP only (05.26.23 @ 23:06) >    ACC: [id number] EXAM:  XR PELVIS AP ONLY 1-2 VIEWS   ORDERED BY: MICHAEL JAMES     PROCEDURE DATE:  05/26/2023          INTERPRETATION:  Clinical History / Reason for exam: Postop.    Frontal view.    COMPARISON: May 23, 2023.    Findings/  impression: Interim left hip arthroplasty in satisfactory position. Soft   tissue air, left lateral skin staples.    --- End of Report ---      < end of copied text >  < from: [facility] Duplex Lower Ext Vein Scan, Tony (05.26.23 @ 10:59) >    ACC: [id number] EXAM:  DUPLEX SCAN EXT VEINS LOWER BI   ORDERED BY: RIDDHI PAINTING     PROCEDURE DATE:  05/26/2023          INTERPRETATION:  CLINICAL INFORMATION: 78-year-old female with lower   extremity swelling    COMPARISON: None available.    TECHNIQUE: Duplex sonography of the BILATERAL LOWER extremity veins with   color and spectral Doppler, with and without compression.    FINDINGS:    RIGHT:  Normal compressibility of the RIGHT common femoral, femoral and popliteal   veins.  Doppler examination shows normal spontaneous and phasic flow.  No RIGHT calf vein thrombosis is detected.    LEFT:  Normal compressibility of the LEFT common femoral, femoral and popliteal   veins.  Doppler examination shows normal spontaneous and phasic flow.  NoLEFT calf vein thrombosis is detected.    IMPRESSION:  No evidence of deep venous thrombosis in either lower extremity.              < end of copied text >

## 2023-05-29 LAB
ALBUMIN SERPL ELPH-MCNC: 2.9 G/DL — LOW (ref 3.5–5.2)
ALP SERPL-CCNC: 112 U/L — SIGNIFICANT CHANGE UP (ref 30–115)
ALT FLD-CCNC: 32 U/L — SIGNIFICANT CHANGE UP (ref 0–41)
ANION GAP SERPL CALC-SCNC: 12 MMOL/L — SIGNIFICANT CHANGE UP (ref 7–14)
AST SERPL-CCNC: 37 U/L — SIGNIFICANT CHANGE UP (ref 0–41)
BASOPHILS # BLD AUTO: 0.04 K/UL — SIGNIFICANT CHANGE UP (ref 0–0.2)
BASOPHILS NFR BLD AUTO: 0.4 % — SIGNIFICANT CHANGE UP (ref 0–1)
BILIRUB SERPL-MCNC: 0.6 MG/DL — SIGNIFICANT CHANGE UP (ref 0.2–1.2)
BUN SERPL-MCNC: 18 MG/DL — SIGNIFICANT CHANGE UP (ref 10–20)
CALCIUM SERPL-MCNC: 8 MG/DL — LOW (ref 8.4–10.5)
CHLORIDE SERPL-SCNC: 102 MMOL/L — SIGNIFICANT CHANGE UP (ref 98–110)
CO2 SERPL-SCNC: 21 MMOL/L — SIGNIFICANT CHANGE UP (ref 17–32)
CREAT SERPL-MCNC: 0.5 MG/DL — LOW (ref 0.7–1.5)
EGFR: 97 ML/MIN/1.73M2 — SIGNIFICANT CHANGE UP
EOSINOPHIL # BLD AUTO: 0.19 K/UL — SIGNIFICANT CHANGE UP (ref 0–0.7)
EOSINOPHIL NFR BLD AUTO: 1.9 % — SIGNIFICANT CHANGE UP (ref 0–8)
GLUCOSE SERPL-MCNC: 102 MG/DL — HIGH (ref 70–99)
HCT VFR BLD CALC: 29.8 % — LOW (ref 37–47)
HGB BLD-MCNC: 10.1 G/DL — LOW (ref 12–16)
IMM GRANULOCYTES NFR BLD AUTO: 0.5 % — HIGH (ref 0.1–0.3)
LYMPHOCYTES # BLD AUTO: 2.16 K/UL — SIGNIFICANT CHANGE UP (ref 1.2–3.4)
LYMPHOCYTES # BLD AUTO: 21.1 % — SIGNIFICANT CHANGE UP (ref 20.5–51.1)
MCHC RBC-ENTMCNC: 30.6 PG — SIGNIFICANT CHANGE UP (ref 27–31)
MCHC RBC-ENTMCNC: 33.9 G/DL — SIGNIFICANT CHANGE UP (ref 32–37)
MCV RBC AUTO: 90.3 FL — SIGNIFICANT CHANGE UP (ref 81–99)
MONOCYTES # BLD AUTO: 1.16 K/UL — HIGH (ref 0.1–0.6)
MONOCYTES NFR BLD AUTO: 11.4 % — HIGH (ref 1.7–9.3)
NEUTROPHILS # BLD AUTO: 6.62 K/UL — HIGH (ref 1.4–6.5)
NEUTROPHILS NFR BLD AUTO: 64.7 % — SIGNIFICANT CHANGE UP (ref 42.2–75.2)
NRBC # BLD: 0 /100 WBCS — SIGNIFICANT CHANGE UP (ref 0–0)
PLATELET # BLD AUTO: 161 K/UL — SIGNIFICANT CHANGE UP (ref 130–400)
PMV BLD: 11.7 FL — HIGH (ref 7.4–10.4)
POTASSIUM SERPL-MCNC: 4.4 MMOL/L — SIGNIFICANT CHANGE UP (ref 3.5–5)
POTASSIUM SERPL-SCNC: 4.4 MMOL/L — SIGNIFICANT CHANGE UP (ref 3.5–5)
PROT SERPL-MCNC: 5.4 G/DL — LOW (ref 6–8)
RBC # BLD: 3.3 M/UL — LOW (ref 4.2–5.4)
RBC # FLD: 13.3 % — SIGNIFICANT CHANGE UP (ref 11.5–14.5)
SODIUM SERPL-SCNC: 135 MMOL/L — SIGNIFICANT CHANGE UP (ref 135–146)
WBC # BLD: 10.22 K/UL — SIGNIFICANT CHANGE UP (ref 4.8–10.8)
WBC # FLD AUTO: 10.22 K/UL — SIGNIFICANT CHANGE UP (ref 4.8–10.8)

## 2023-05-29 PROCEDURE — 99232 SBSQ HOSP IP/OBS MODERATE 35: CPT

## 2023-05-29 RX ORDER — LACTULOSE 10 G/15ML
15 SOLUTION ORAL
Refills: 0 | Status: DISCONTINUED | OUTPATIENT
Start: 2023-05-29 | End: 2023-05-29

## 2023-05-29 RX ORDER — LACTULOSE 10 G/15ML
15 SOLUTION ORAL
Refills: 0 | Status: COMPLETED | OUTPATIENT
Start: 2023-05-29 | End: 2023-05-29

## 2023-05-29 RX ADMIN — Medication 81 MILLIGRAM(S): at 12:36

## 2023-05-29 RX ADMIN — Medication 25 MILLIGRAM(S): at 06:17

## 2023-05-29 RX ADMIN — ENOXAPARIN SODIUM 40 MILLIGRAM(S): 100 INJECTION SUBCUTANEOUS at 21:46

## 2023-05-29 RX ADMIN — PANTOPRAZOLE SODIUM 40 MILLIGRAM(S): 20 TABLET, DELAYED RELEASE ORAL at 06:17

## 2023-05-29 RX ADMIN — LACTULOSE 15 GRAM(S): 10 SOLUTION ORAL at 07:01

## 2023-05-29 RX ADMIN — Medication 10 MILLIGRAM(S): at 06:16

## 2023-05-29 RX ADMIN — SIMVASTATIN 20 MILLIGRAM(S): 20 TABLET, FILM COATED ORAL at 21:47

## 2023-05-29 NOTE — PHYSICAL THERAPY INITIAL EVALUATION ADULT - GENERAL OBSERVATIONS, REHAB EVAL
9:20-10:00 Pt encountered semi de souza in bed in NAD with +call bell, +bed rails, +bed alarm, +primafit, agreeable to therapy.

## 2023-05-29 NOTE — PROGRESS NOTE ADULT - SUBJECTIVE AND OBJECTIVE BOX
RUDY STINSON 78y Female  MRN#: [id number]   Hospital Day: 6d    SUBJECTIVE  Patient is a 78y old Female who presents with a chief complaint of Left femoral neck fracture (29 May 2023 08:01)  Currently admitted to medicine with the primary diagnosis of Hip fracture      INTERVAL HPI AND OVERNIGHT EVENTS:  Patient was examined and seen at bedside. This morning she is resting comfortably in bed and reports no issues or overnight events.    OBJECTIVE  PAST MEDICAL & SURGICAL HISTORY  HTN (hypertension)    Hypercholesteremia    TIA (transient ischemic attack)  2016    Murmur    Cancer  BREAST   MASTECTOMY RIGHT  CHEMO [id number]    Breast CA  Left -1/2021    Pre-diabetes    H/O right mastectomy  1989    S/P excision of lipoma    H/O left mastectomy  2021    History of lung biopsy  3/2021      ALLERGIES:  penicillins (Swelling)    MEDICATIONS:  STANDING MEDICATIONS  aspirin  chewable 81 milliGRAM(s) Oral daily  bisacodyl 5 milliGRAM(s) Oral daily  enalapril 10 milliGRAM(s) Oral daily  enoxaparin Injectable 40 milliGRAM(s) SubCutaneous every 24 hours  lactulose Syrup 15 Gram(s) Oral every 3 hours  metoprolol tartrate 25 milliGRAM(s) Oral daily  pantoprazole    Tablet 40 milliGRAM(s) Oral before breakfast  senna 2 Tablet(s) Oral at bedtime  simvastatin 20 milliGRAM(s) Oral at bedtime    PRN MEDICATIONS  acetaminophen     Tablet .. 650 milliGRAM(s) Oral every 6 hours PRN  aluminum hydroxide/magnesium hydroxide/simethicone Suspension 30 milliLiter(s) Oral every 4 hours PRN  HYDROmorphone  Injectable 0.5 milliGRAM(s) IV Push every 10 minutes PRN  melatonin 3 milliGRAM(s) Oral at bedtime PRN  morphine  - Injectable 2 milliGRAM(s) IV Push every 4 hours PRN  ondansetron Injectable 4 milliGRAM(s) IV Push once PRN  ondansetron Injectable 4 milliGRAM(s) IV Push every 8 hours PRN  oxyCODONE    IR 5 milliGRAM(s) Oral once PRN      VITAL SIGNS: Last 24 Hours  T(C): 37.6 (29 May 2023 08:43), Max: 37.7 (28 May 2023 15:20)  T(F): 99.6 (29 May 2023 08:43), Max: 99.9 (28 May 2023 15:20)  HR: 80 (29 May 2023 08:43) (80 - 92)  BP: 124/58 (29 May 2023 08:43) (120/58 - 144/63)  BP(mean): --  RR: 18 (28 May 2023 23:51) (18 - 18)  SpO2: 95% (28 May 2023 23:51) (93% - 98%)    LABS:                        10.1   10.22 )-----------( 161      ( 29 May 2023 05:55 )             29.8     05-29    135  |  102  |  18  ----------------------------<  102<H>  4.4   |  21  |  0.5<L>    Ca    8.0<L>      29 May 2023 05:55    TPro  5.4<L>  /  Alb  2.9<L>  /  TBili  0.6  /  DBili  x   /  AST  37  /  ALT  32  /  AlkPhos  112  05-29              Culture - Joint Fluid (collected 26 May 2023 20:24)  Source: Knee None  Gram Stain (27 May 2023 12:10):    Rare polymorphonuclear leukocytes per low power field    No organisms seen per oil power field  Preliminary Report (28 May 2023 09:13):    No growth          RADIOLOGY:          PHYSICAL EXAM:  CONSTITUTIONAL: No acute distress, well-developed, well-groomed, AAOx3  PULMONARY: Clear to auscultation bilaterally; no wheezes, rales, or rhonchi  CARDIOVASCULAR: Regular rate and rhythm; no murmurs, rubs, or gallops  GASTROINTESTINAL: Soft, non-tender, non-distended; bowel sounds present  MUSCULOSKELETAL: surgery site noted

## 2023-05-29 NOTE — PROGRESS NOTE ADULT - SUBJECTIVE AND OBJECTIVE BOX
SUBJECTIVE: Patient seen and examined. Pain controlled.  Pt did well o/n  No f/c/n/v/cp/sob. Right knee pain improving.    OBJECTIVE:  NAD  Vital Signs Last 24 Hrs  T(C): 37.4 (28 May 2023 00:12), Max: 37.4 (28 May 2023 00:12)  T(F): 99.4 (28 May 2023 00:12), Max: 99.4 (28 May 2023 00:12)  HR: 82 (28 May 2023 05:00) (59 - 90)  BP: 132/61 (28 May 2023 05:00) (102/54 - 135/63)  BP(mean): --  RR: 18 (28 May 2023 00:12) (18 - 18)  SpO2: 95% (28 May 2023 00:12) (95% - 98%)    Parameters below as of 27 May 2023 15:20  Patient On (Oxygen Delivery Method): nasal cannula        Affected extremity:   LLE         Aquacel Dressing: clean/dry/intact            Sensation: SILT         Motor exam: 5/5 TA/GS/EHL         warm well perfused; capillary refill <3 seconds                         11.8   12.63 )-----------( 151      ( 27 May 2023 11:36 )             34.1     05-27    133<L>  |  98  |  24<H>  ----------------------------<  147<H>  4.6   |  22  |  0.7    Ca    7.9<L>      27 May 2023 11:36    TPro  5.7<L>  /  Alb  3.1<L>  /  TBili  0.4  /  DBili  x   /  AST  32  /  ALT  13  /  AlkPhos  66  05-27    A/P : 78yFemale POD3 s/p L hip hemiarthroplasty and R knee arthrocentesis + CSI, on 5/26/23, doing well.  Cleared for discharge from an orthopedic standpoint    - Rheumatology consult for treatment of Pseudogout   - Activity: WBAT LLE  - DVT PPx: LVX/SQH per primary team, include SCDs  - Arthrocentesis positive for CPPD crystals   - Pain control  - IS encouraged  - AM labs  - PT/Rehab  - D/C planning    - If discharged, patient should follow up with Dr. James at [facility] phone [phone] in 2 weeks  - Patient should be discharged on 6 weeks (from surgery date) of Aspirin 325mg daily for DVT prophylaxis as their mobility/function/ambulation will be decreased due to lower extremity orthopaedic surgery.

## 2023-05-29 NOTE — PHYSICAL THERAPY INITIAL EVALUATION ADULT - ADDITIONAL COMMENTS
Pt lives in private house with 5 steps to enter, 0 steps inside. Pt lives with  who uses 2 canes to ambulate, unable to assist pt during the day. Previously independent in all aspects of ADLs without AD.

## 2023-05-29 NOTE — PHYSICAL THERAPY INITIAL EVALUATION ADULT - PERTINENT HX OF CURRENT PROBLEM, REHAB EVAL
Pt is a 77 y/o right-handed F with PMHx of HTN, HLD, TIA, breast cancer (s/p bilateral mastectomy, in remission) presents with pain in left hip s/p mechanical ground level fall. S/p 5/26 ORIF, fracture, femoral neck, with cemented hemiarthroplasty,

## 2023-05-29 NOTE — PHYSICAL THERAPY INITIAL EVALUATION ADULT - NSACTIVITYREC_GEN_A_PT
Patient requires assistance with functional mobility. PT recommends D/C to rehabilitation facility when medically appropriate. PT recommends RW for D/C to home. Refer to evaluation for details.

## 2023-05-29 NOTE — PROGRESS NOTE ADULT - SUBJECTIVE AND OBJECTIVE BOX
Patient is a 78y old  Female who presents with a chief complaint of Left femoral neck fracture (24 May 2023 10:36)    Patient was seen and examined.  S/p L hip hemiarthroplasty on 5/26  Pain controlled with pain meds  c/o constipation  All systems reviewed.     PAST MEDICAL & SURGICAL HISTORY:  HTN (hypertension)  Hypercholesteremia  TIA (transient ischemic attack), 2016  Murmur  Cancer, BREAST   MASTECTOMY RIGHT  CHEMO [id number]  Breast CA Left -1/2021  Pre-diabetes  H/O right mastectomy 1989  S/P excision of lipoma  H/O left mastectomy 2021  History of lung biopsy 3/2021    Allergies  penicillins (Swelling)    MEDICATIONS  (STANDING):  aspirin  chewable 81 milliGRAM(s) Oral daily  bisacodyl 5 milliGRAM(s) Oral daily  enalapril 10 milliGRAM(s) Oral daily  enoxaparin Injectable 40 milliGRAM(s) SubCutaneous every 24 hours  lactulose Syrup 15 Gram(s) Oral every 3 hours  metoprolol tartrate 25 milliGRAM(s) Oral daily  pantoprazole    Tablet 40 milliGRAM(s) Oral before breakfast  senna 2 Tablet(s) Oral at bedtime  simvastatin 20 milliGRAM(s) Oral at bedtime    MEDICATIONS  (PRN):  acetaminophen     Tablet .. 650 milliGRAM(s) Oral every 6 hours PRN Temp greater or equal to 38C (100.4F), Mild Pain (1 - 3)  aluminum hydroxide/magnesium hydroxide/simethicone Suspension 30 milliLiter(s) Oral every 4 hours PRN Dyspepsia  HYDROmorphone  Injectable 0.5 milliGRAM(s) IV Push every 10 minutes PRN Severe Pain (7 - 10)  melatonin 3 milliGRAM(s) Oral at bedtime PRN Insomnia  morphine  - Injectable 2 milliGRAM(s) IV Push every 4 hours PRN Severe Pain (7 - 10)  ondansetron Injectable 4 milliGRAM(s) IV Push once PRN Nausea and/or Vomiting  ondansetron Injectable 4 milliGRAM(s) IV Push every 8 hours PRN Nausea and/or Vomiting  oxyCODONE    IR 5 milliGRAM(s) Oral once PRN Moderate Pain (4 - 6)    T(C): 37.6 (05-29-23 @ 08:43), Max: 37.7 (05-28-23 @ 15:20)  HR: 80 (05-29-23 @ 08:43) (80 - 92)  BP: 124/58 (05-29-23 @ 08:43) (120/58 - 144/63)  RR: 18 (05-28-23 @ 23:51) (18 - 18)  SpO2: 95% (05-28-23 @ 23:51) (93% - 95%)    O/E:  Awake, alert, not in distress.  HEENT: atraumatic, EOMI.  Chest: clear.  CVS: SIS2 +, no murmur.  P/A: Soft, BS+  CNS: awake, alert  Ext: no edema feet. left hip dressing+  All systems reviewed positive findings as above.                                 10.1<L>  10.22 )-----------( 161      ( 29 May 2023 05:55 )             29.8<L>                        10.9<L>  10.84<H> )-----------( 146      ( 28 May 2023 04:30 )             31.4<L>    05-29    135  |  102  |  18  ----------------------------<  102<H>  4.4   |  21  |  0.5<L>  05-28    132<L>  |  101  |  22<H>  ----------------------------<  121<H>  4.4   |  23  |  0.7    Ca    8.0<L>      29 May 2023 05:55  Ca    8.0<L>      28 May 2023 04:30    TPro  5.4<L>  /  Alb  2.9<L>  /  TBili  0.6  /  DBili  x   /  AST  37  /  ALT  32  /  AlkPhos  112  05-29  TPro  5.4<L>  /  Alb  3.0<L>  /  TBili  0.5  /  DBili  x   /  AST  55<H>  /  ALT  30  /  AlkPhos  90  05-28

## 2023-05-29 NOTE — PROGRESS NOTE ADULT - ATTENDING COMMENTS
POD2  Doing well  mobilizing with PT
POD3 s/p L hip anaid  WBAT w walker   Lovenox  Follow up with Dr. James outpt 2 weeks [address] [facility] .
Pt seen bedside  Agree with resident plan  WBAT  f/u Dr. James as outpt

## 2023-05-30 ENCOUNTER — TRANSCRIPTION ENCOUNTER (OUTPATIENT)
Age: 78
End: 2023-05-30

## 2023-05-30 LAB
BASOPHILS # BLD AUTO: 0.03 K/UL — SIGNIFICANT CHANGE UP (ref 0–0.2)
BASOPHILS NFR BLD AUTO: 0.3 % — SIGNIFICANT CHANGE UP (ref 0–1)
EOSINOPHIL # BLD AUTO: 0.19 K/UL — SIGNIFICANT CHANGE UP (ref 0–0.7)
EOSINOPHIL NFR BLD AUTO: 2.1 % — SIGNIFICANT CHANGE UP (ref 0–8)
HCT VFR BLD CALC: 29.4 % — LOW (ref 37–47)
HGB BLD-MCNC: 9.7 G/DL — LOW (ref 12–16)
IMM GRANULOCYTES NFR BLD AUTO: 1.3 % — HIGH (ref 0.1–0.3)
LYMPHOCYTES # BLD AUTO: 1.83 K/UL — SIGNIFICANT CHANGE UP (ref 1.2–3.4)
LYMPHOCYTES # BLD AUTO: 20 % — LOW (ref 20.5–51.1)
MCHC RBC-ENTMCNC: 30.2 PG — SIGNIFICANT CHANGE UP (ref 27–31)
MCHC RBC-ENTMCNC: 33 G/DL — SIGNIFICANT CHANGE UP (ref 32–37)
MCV RBC AUTO: 91.6 FL — SIGNIFICANT CHANGE UP (ref 81–99)
MONOCYTES # BLD AUTO: 0.87 K/UL — HIGH (ref 0.1–0.6)
MONOCYTES NFR BLD AUTO: 9.5 % — HIGH (ref 1.7–9.3)
NEUTROPHILS # BLD AUTO: 6.11 K/UL — SIGNIFICANT CHANGE UP (ref 1.4–6.5)
NEUTROPHILS NFR BLD AUTO: 66.8 % — SIGNIFICANT CHANGE UP (ref 42.2–75.2)
NRBC # BLD: 0 /100 WBCS — SIGNIFICANT CHANGE UP (ref 0–0)
PLATELET # BLD AUTO: 170 K/UL — SIGNIFICANT CHANGE UP (ref 130–400)
PMV BLD: 11.1 FL — HIGH (ref 7.4–10.4)
RBC # BLD: 3.21 M/UL — LOW (ref 4.2–5.4)
RBC # FLD: 13.3 % — SIGNIFICANT CHANGE UP (ref 11.5–14.5)
SARS-COV-2 RNA SPEC QL NAA+PROBE: SIGNIFICANT CHANGE UP
WBC # BLD: 9.15 K/UL — SIGNIFICANT CHANGE UP (ref 4.8–10.8)
WBC # FLD AUTO: 9.15 K/UL — SIGNIFICANT CHANGE UP (ref 4.8–10.8)

## 2023-05-30 PROCEDURE — 99232 SBSQ HOSP IP/OBS MODERATE 35: CPT

## 2023-05-30 RX ORDER — POLYETHYLENE GLYCOL 3350 17 G/17G
17 POWDER, FOR SOLUTION ORAL
Refills: 0 | Status: DISCONTINUED | OUTPATIENT
Start: 2023-05-30 | End: 2023-06-07

## 2023-05-30 RX ORDER — FERROUS SULFATE 325(65) MG
325 TABLET ORAL DAILY
Refills: 0 | Status: DISCONTINUED | OUTPATIENT
Start: 2023-05-30 | End: 2023-05-31

## 2023-05-30 RX ORDER — GABAPENTIN 400 MG/1
100 CAPSULE ORAL THREE TIMES A DAY
Refills: 0 | Status: DISCONTINUED | OUTPATIENT
Start: 2023-05-30 | End: 2023-05-30

## 2023-05-30 RX ORDER — GABAPENTIN 400 MG/1
100 CAPSULE ORAL THREE TIMES A DAY
Refills: 0 | Status: DISCONTINUED | OUTPATIENT
Start: 2023-05-30 | End: 2023-06-07

## 2023-05-30 RX ADMIN — POLYETHYLENE GLYCOL 3350 17 GRAM(S): 17 POWDER, FOR SOLUTION ORAL at 22:04

## 2023-05-30 RX ADMIN — SIMVASTATIN 20 MILLIGRAM(S): 20 TABLET, FILM COATED ORAL at 21:20

## 2023-05-30 RX ADMIN — Medication 10 MILLIGRAM(S): at 05:14

## 2023-05-30 RX ADMIN — Medication 81 MILLIGRAM(S): at 11:06

## 2023-05-30 RX ADMIN — GABAPENTIN 100 MILLIGRAM(S): 400 CAPSULE ORAL at 21:20

## 2023-05-30 RX ADMIN — Medication 25 MILLIGRAM(S): at 05:14

## 2023-05-30 RX ADMIN — ENOXAPARIN SODIUM 40 MILLIGRAM(S): 100 INJECTION SUBCUTANEOUS at 22:04

## 2023-05-30 RX ADMIN — PANTOPRAZOLE SODIUM 40 MILLIGRAM(S): 20 TABLET, DELAYED RELEASE ORAL at 05:14

## 2023-05-30 NOTE — DISCHARGE NOTE PROVIDER - NSDCFUADDAPPT_GEN_ALL_CORE_FT
follow up with Dr. James at [address] [facility] phone [phone] in 2 weeks   Follow up with Dr. James at [address] [facility] phone [phone] in 2 weeks

## 2023-05-30 NOTE — DISCHARGE NOTE PROVIDER - NSDCCPCAREPLAN_GEN_ALL_CORE_FT
PRINCIPAL DISCHARGE DIAGNOSIS  Diagnosis: Hip fracture  Assessment and Plan of Treatment: You were found to have a hip fracture and underwent an arthroplasty on 5/26  You had knee pain and knee was tapped => was found to have pseudogout, given steroids in the joint  You also mentioned chronic neuropathic pain, started low dose gabapentin  Follow up with PCP and orthopedics as indicated       PRINCIPAL DISCHARGE DIAGNOSIS  Diagnosis: Hip fracture  Assessment and Plan of Treatment: You were found to have a hip fracture and underwent an arthroplasty on 5/26  You had knee pain and knee was tapped => was found to have pseudogout, given steroids in the joint  You also mentioned chronic neuropathic pain, started low dose gabapentin  Follow up with PCP and orthopedics as indicated  Take the high dose aspirin instead of your regular aspirin until July 7 as indicated (it will help prevent blood clots after surgery)       PRINCIPAL DISCHARGE DIAGNOSIS  Diagnosis: Hip fracture  Assessment and Plan of Treatment: You were found to have a hip fracture and underwent an arthroplasty on 5/26  You had knee pain and knee was tapped => was found to have pseudogout, given steroids in the joint  You also mentioned chronic neuropathic pain, started low dose gabapentin  You were also found to have blood clot on your left leg. You are to take blood thinner as directed and follow up with your PCP.   Follow up with PCP and orthopedics as indicated.   If you experience any chest pain, bleeding from anywhere, fatigue, shortness of breath please seek immediate medical help or call 911.

## 2023-05-30 NOTE — DISCHARGE NOTE PROVIDER - NSDCACTIVITY_GEN_ALL_CORE
Do not drive or operate machinery/Do not make important decisions/No heavy lifting/straining Do not drive or operate machinery/No heavy lifting/straining/Follow Instructions Provided by your Surgical Team

## 2023-05-30 NOTE — PROGRESS NOTE ADULT - SUBJECTIVE AND OBJECTIVE BOX
SUBJECTIVE: Patient seen and examined. Pain controlled.  Pt did well o/n  No f/c/n/v/cp/sob.     OBJECTIVE:  NAD  Vital Signs Last 24 Hrs  T(C): 36.7 (29 May 2023 23:42), Max: 37.6 (29 May 2023 08:43)  T(F): 98.1 (29 May 2023 23:42), Max: 99.6 (29 May 2023 08:43)  HR: 82 (30 May 2023 06:00) (80 - 88)  BP: 134/63 (30 May 2023 06:00) (121/58 - 134/63)  BP(mean): --  RR: 18 (30 May 2023 06:00) (18 - 18)  SpO2: 97% (30 May 2023 06:00) (96% - 97%)    Parameters below as of 30 May 2023 06:00  Patient On (Oxygen Delivery Method): room air        Affected extremity:   LLE         Aquacel Dressing: clean/dry/intact            Sensation: SILT         Motor exam: 5/5 TA/GS/EHL         warm well perfused; capillary refill <3 seconds                10.1   10.22 )-----------( 161      ( 29 May 2023 05:55 )             29.8     05-29    135  |  102  |  18  ----------------------------<  102<H>  4.4   |  21  |  0.5<L>    Ca    8.0<L>      29 May 2023 05:55    TPro  5.4<L>  /  Alb  2.9<L>  /  TBili  0.6  /  DBili  x   /  AST  37  /  ALT  32  /  AlkPhos  112  05-29    A/P : 78yFemale POD4 s/p L hip hemiarthroplasty and R knee arthrocentesis + CSI, on 5/26/23, doing well.  Cleared for discharge from an orthopedic standpoint    - Rheumatology consult for treatment of Pseudogout   - Activity: WBAT LLE  - DVT PPx: LVX/SQH per primary team, include SCDs  - Arthrocentesis positive for CPPD crystals   - Pain control  - IS encouraged  - AM labs  - PT/Rehab  - D/C planning    - If discharged, patient should follow up with Dr. James at [address] [facility] phone [phone] in 2 weeks  - Patient should be discharged on 6 weeks (from surgery date) of Aspirin 325mg daily for DVT prophylaxis as their mobility/function/ambulation will be decreased due to lower extremity orthopaedic surgery.

## 2023-05-30 NOTE — PROGRESS NOTE ADULT - SUBJECTIVE AND OBJECTIVE BOX
Patient is a 78y old  Female who presents with a chief complaint of Left femoral neck fracture (24 May 2023 10:36)    Patient was seen and examined.  S/p L hip hemiarthroplasty on 5/26  Pain controlled with pain meds  All systems reviewed.     PAST MEDICAL & SURGICAL HISTORY:  HTN (hypertension)  Hypercholesteremia  TIA (transient ischemic attack), 2016  Murmur  Cancer, BREAST   MASTECTOMY RIGHT  CHEMO [id number]  Breast CA Left -1/2021  Pre-diabetes  H/O right mastectomy 1989  S/P excision of lipoma  H/O left mastectomy 2021  History of lung biopsy 3/2021    Allergies  penicillins (Swelling)    MEDICATIONS  (STANDING):  aspirin  chewable 81 milliGRAM(s) Oral daily  bisacodyl 5 milliGRAM(s) Oral daily  enalapril 10 milliGRAM(s) Oral daily  enoxaparin Injectable 40 milliGRAM(s) SubCutaneous every 24 hours  ferrous    sulfate 325 milliGRAM(s) Oral daily  gabapentin 100 milliGRAM(s) Oral three times a day  metoprolol tartrate 25 milliGRAM(s) Oral daily  pantoprazole    Tablet 40 milliGRAM(s) Oral before breakfast  senna 2 Tablet(s) Oral at bedtime  simvastatin 20 milliGRAM(s) Oral at bedtime    MEDICATIONS  (PRN):  acetaminophen     Tablet .. 650 milliGRAM(s) Oral every 6 hours PRN Temp greater or equal to 38C (100.4F), Mild Pain (1 - 3)  aluminum hydroxide/magnesium hydroxide/simethicone Suspension 30 milliLiter(s) Oral every 4 hours PRN Dyspepsia  HYDROmorphone  Injectable 0.5 milliGRAM(s) IV Push every 10 minutes PRN Severe Pain (7 - 10)  melatonin 3 milliGRAM(s) Oral at bedtime PRN Insomnia  morphine  - Injectable 2 milliGRAM(s) IV Push every 4 hours PRN Severe Pain (7 - 10)  ondansetron Injectable 4 milliGRAM(s) IV Push every 8 hours PRN Nausea and/or Vomiting    T(C): 37.7 (05-30-23 @ 07:15), Max: 37.7 (05-30-23 @ 07:15)  HR: 73 (05-30-23 @ 07:15) (73 - 83)  BP: 123/59 (05-30-23 @ 07:15) (123/59 - 134/63)  RR: 18 (05-30-23 @ 07:15) (18 - 18)  SpO2: 96% (05-30-23 @ 07:15) (96% - 97%)    O/E:  Awake, alert, not in distress.  HEENT: atraumatic, EOMI.  Chest: clear.  CVS: SIS2 +, no murmur.  P/A: Soft, BS+  CNS: awake, alert  Ext: no edema feet. left hip dressing+  All systems reviewed positive findings as above.                                      9.7<L>  9.15  )-----------( 170      ( 30 May 2023 07:00 )             29.4<L>                        10.1<L>  10.22 )-----------( 161      ( 29 May 2023 05:55 )             29.8<L>  05-29    135  |  102  |  18  ----------------------------<  102<H>  4.4   |  21  |  0.5<L>    Ca    8.0<L>      29 May 2023 05:55    TPro  5.4<L>  /  Alb  2.9<L>  /  TBili  0.6  /  DBili  x   /  AST  37  /  ALT  32  /  AlkPhos  112  05-29

## 2023-05-30 NOTE — DISCHARGE NOTE PROVIDER - NSDCFUSCHEDAPPT_GEN_ALL_CORE_FT
Brit Ramos  [facility] PreAdmits  Scheduled Appointment: 06/05/2023    [facility] Physician [facility]  AMBCHEMO SI [address]  Scheduled Appointment: 06/05/2023    [facility] Physician [facility]  AMBCHEMO SI [address]  Scheduled Appointment: 07/03/2023    Brit Ramos  [facility] PreAdmits  Scheduled Appointment: 07/03/2023    Brit Ramos  [facility] PreAdmits  Scheduled Appointment: 07/31/2023    [facility] Physician [facility]  AMBCHEMO SI [address]  Scheduled Appointment: 07/31/2023    [facility]  AMBCHEMO SI [address]  Scheduled Appointment: 08/28/2023    Brit Ramos  [facility] PreAdmits  Scheduled Appointment: 08/28/2023     Brit Ramos  [facility] PreAdmits  Scheduled Appointment: 07/03/2023    [facility] Physician Count includes the [facility]  AMBCHEMO SI [address]  Scheduled Appointment: 07/03/2023    [facility]  AMBCHEMO SI [address]  Scheduled Appointment: 07/31/2023    Brit Ramos  [facility] PreAdmits  Scheduled Appointment: 07/31/2023    Brit Ramos  [facility] PreAdmits  Scheduled Appointment: 08/28/2023    [facility] Physician Count includes the [facility]  AMBCHEMO SI [address]  Scheduled Appointment: 08/28/2023

## 2023-05-30 NOTE — DISCHARGE NOTE PROVIDER - CARE PROVIDER_API CALL
Radha Mayberry[address] [address]  Phone: [phone]  Fax: [phone]  Follow Up Time: 1 week    Eliel James  Orthopaedic Surgery  [address] [place] [address]  Phone: [phone]  Fax: [phone]  Follow Up Time: 2 weeks

## 2023-05-30 NOTE — DISCHARGE NOTE PROVIDER - PROVIDER TOKENS
PROVIDER:[TOKEN:[[id number]],FOLLOWUP:[1 week]],PROVIDER:[TOKEN:[[id number]],FOLLOWUP:[2 weeks]]

## 2023-05-30 NOTE — DISCHARGE NOTE PROVIDER - ATTENDING DISCHARGE PHYSICAL EXAMINATION:
T(C): 36.6 (06-07-23 @ 08:32), Max: 36.6 (06-07-23 @ 08:32)  HR: 75 (06-07-23 @ 08:32) (75 - 81)  BP: 120/56 (06-07-23 @ 08:32) (106/53 - 120/56)  RR: 18 (06-07-23 @ 08:32) (18 - 18)  SpO2: 99% (06-07-23 @ 08:32) (96% - 99%)    O/E:  Awake, alert, not in distress.  HEENT: atraumatic, EOMI.  Chest: clear.  CVS: SIS2 +, no murmur.  P/A: Soft, BS+  CNS: awake, alert  Ext: no edema feet. left hip dressing+  All systems reviewed positive findings as above.

## 2023-05-30 NOTE — DISCHARGE NOTE PROVIDER - NSDCMRMEDTOKEN_GEN_ALL_CORE_FT
aspirin 81 mg oral tablet, chewable: 1 tab(s) orally once a day  Calcium 500+D oral tablet, chewable: 1 tab(s) orally 2 times a day  enalapril 10 mg oral tablet: 1 tab(s) orally once a day  Fish Oil oral capsule:   metoprolol tartrate 25 mg oral tablet: 1 tab(s) orally once a day  simvastatin 20 mg oral tablet: 1 tab(s) orally once a day (at bedtime)   aspirin 325 mg oral tablet: 1 tab(s) orally once a day Take from now till July 7 2023 then start taking the 81mg aspirin (your home dose)  aspirin 81 mg oral tablet, chewable: 1 tab(s) orally once a day Start taking this on July 8 2023  (take the high dose aspirin in the meantime)  Calcium 500+D oral tablet, chewable: 1 tab(s) orally 2 times a day  enalapril 10 mg oral tablet: 1 tab(s) orally once a day  Fish Oil oral capsule:   gabapentin 100 mg oral capsule: 1 cap(s) orally 3 times a day  metoprolol tartrate 25 mg oral tablet: 1 tab(s) orally once a day  pantoprazole 40 mg oral delayed release tablet: 1 tab(s) orally once a day (before a meal)  polyethylene glycol 3350 oral powder for reconstitution: 17 gram(s) orally 2 times a day  senna leaf extract oral tablet: 2 tab(s) orally once a day (at bedtime)  simvastatin 20 mg oral tablet: 1 tab(s) orally once a day (at bedtime)   aspirin 325 mg oral tablet: 1 tab(s) orally once a day Take from now till July 7 2023 then start taking the 81mg aspirin (your home dose)  aspirin 81 mg oral tablet, chewable: 1 tab(s) orally once a day Start taking this on July 8 2023  (take the high dose aspirin in the meantime)  Calcium 500+D oral tablet, chewable: 1 tab(s) orally 2 times a day  Eliquis 5 mg oral tablet: 2 tab(s) orally 2 times a day Take 2 tabs twice daily by mouth for the first 7 days starting 6/7/23   Take 1 tabs twice daily by mouth starting 6/14/23  enalapril 10 mg oral tablet: 1 tab(s) orally once a day  Fish Oil oral capsule:   gabapentin 100 mg oral capsule: 1 cap(s) orally 3 times a day  metoprolol tartrate 25 mg oral tablet: 1 tab(s) orally once a day  pantoprazole 40 mg oral delayed release tablet: 1 tab(s) orally once a day (before a meal)  polyethylene glycol 3350 oral powder for reconstitution: 17 gram(s) orally 2 times a day  senna leaf extract oral tablet: 2 tab(s) orally once a day (at bedtime)  simvastatin 20 mg oral tablet: 1 tab(s) orally once a day (at bedtime)   acetaminophen 325 mg oral tablet: 2 tab(s) orally every 6 hours As needed Temp greater or equal to 38C (100.4F), Mild Pain (1 - 3)  aspirin 81 mg oral tablet, chewable: 1 tab(s) orally once a day  Calcium 500+D oral tablet, chewable: 1 tab(s) orally 2 times a day  Eliquis 5 mg oral tablet: 2 tab(s) orally 2 times a day Take 2 tabs twice daily by mouth for the first 7 days starting 6/7/23   Take 1 tabs twice daily by mouth starting 6/14/23  enalapril 10 mg oral tablet: 1 tab(s) orally once a day  ferrous sulfate 325 mg (65 mg elemental iron) oral tablet: 1 tab(s) orally once a day  Fish Oil oral capsule:   gabapentin 100 mg oral capsule: 1 cap(s) orally 3 times a day  metoprolol tartrate 25 mg oral tablet: 1 tab(s) orally once a day  pantoprazole 40 mg oral delayed release tablet: 1 tab(s) orally once a day (before a meal)  polyethylene glycol 3350 oral powder for reconstitution: 17 gram(s) orally 2 times a day as needed for  constipation  senna leaf extract oral tablet: 2 tab(s) orally once a day (at bedtime)  simvastatin 20 mg oral tablet: 1 tab(s) orally once a day (at bedtime)

## 2023-05-30 NOTE — PROGRESS NOTE ADULT - SUBJECTIVE AND OBJECTIVE BOX
RUDY STINSON 78y Female  MRN#: [id number]   Hospital Day: 7d    SUBJECTIVE  Patient is a 78y old Female who presents with a chief complaint of Left femoral neck fracture (30 May 2023 06:09)  Currently admitted to medicine with the primary diagnosis of Hip fracture      INTERVAL HPI AND OVERNIGHT EVENTS:  Patient was examined and seen at bedside. This morning she is resting comfortably in bed and reports no issues or overnight events.    OBJECTIVE  PAST MEDICAL & SURGICAL HISTORY  HTN (hypertension)    Hypercholesteremia    TIA (transient ischemic attack)  2016    Murmur    Cancer  BREAST   MASTECTOMY RIGHT  CHEMO [id number]    Breast CA  Left -1/2021    Pre-diabetes    H/O right mastectomy  1989    S/P excision of lipoma    H/O left mastectomy  2021    History of lung biopsy  3/2021      ALLERGIES:  penicillins (Swelling)    MEDICATIONS:  STANDING MEDICATIONS  aspirin  chewable 81 milliGRAM(s) Oral daily  bisacodyl 5 milliGRAM(s) Oral daily  enalapril 10 milliGRAM(s) Oral daily  enoxaparin Injectable 40 milliGRAM(s) SubCutaneous every 24 hours  gabapentin Solution 100 milliGRAM(s) Oral three times a day  metoprolol tartrate 25 milliGRAM(s) Oral daily  pantoprazole    Tablet 40 milliGRAM(s) Oral before breakfast  senna 2 Tablet(s) Oral at bedtime  simvastatin 20 milliGRAM(s) Oral at bedtime    PRN MEDICATIONS  acetaminophen     Tablet .. 650 milliGRAM(s) Oral every 6 hours PRN  aluminum hydroxide/magnesium hydroxide/simethicone Suspension 30 milliLiter(s) Oral every 4 hours PRN  HYDROmorphone  Injectable 0.5 milliGRAM(s) IV Push every 10 minutes PRN  melatonin 3 milliGRAM(s) Oral at bedtime PRN  morphine  - Injectable 2 milliGRAM(s) IV Push every 4 hours PRN  ondansetron Injectable 4 milliGRAM(s) IV Push every 8 hours PRN  ondansetron Injectable 4 milliGRAM(s) IV Push once PRN  oxyCODONE    IR 5 milliGRAM(s) Oral once PRN      VITAL SIGNS: Last 24 Hours  T(C): 36.7 (29 May 2023 23:42), Max: 37.5 (29 May 2023 15:20)  T(F): 98.1 (29 May 2023 23:42), Max: 99.5 (29 May 2023 15:20)  HR: 82 (30 May 2023 06:00) (82 - 88)  BP: 134/63 (30 May 2023 06:00) (121/58 - 134/63)  BP(mean): --  RR: 18 (30 May 2023 06:00) (18 - 18)  SpO2: 97% (30 May 2023 06:00) (96% - 97%)    LABS:                        9.7    9.15  )-----------( 170      ( 30 May 2023 07:00 )             29.4     05-29    135  |  102  |  18  ----------------------------<  102<H>  4.4   |  21  |  0.5<L>    Ca    8.0<L>      29 May 2023 05:55    TPro  5.4<L>  /  Alb  2.9<L>  /  TBili  0.6  /  DBili  x   /  AST  37  /  ALT  32  /  AlkPhos  112  05-29                      RADIOLOGY:          PHYSICAL EXAM:  CONSTITUTIONAL: No acute distress, well-developed, well-groomed, AAOx3  PULMONARY: Clear to auscultation bilaterally; no wheezes, rales, or rhonchi  CARDIOVASCULAR: Regular rate and rhythm; no murmurs, rubs, or gallops  GASTROINTESTINAL: Soft, non-tender, non-distended; bowel sounds present  MUSCULOSKELETAL: surgery site noted

## 2023-05-30 NOTE — DISCHARGE NOTE PROVIDER - HOSPITAL COURSE
79 y/o F with PMHx of HTN, HLD, TIA, breast cancer (s/p bilateral mastectomy, in remission) presents with pain in left hip s/p mechanical fall, Found to have Left acute femoral neck fracture.    Patient underwent arthroplasty on 5/26  Hb has been trending down but no active source of bleed, will dc on oral iron  She had knee pain and knee was tapped => pseudogout, given intra-articular steroids, resolved  She has chronic neuropathic pain, started low dose gabapentin    #Left acute femoral neck fracture  #Post op arthroplasty (OR on 5/26 PM)  #Anemia post-op  - Sp mechanical fall  - Xray pelvis 5/23: Examination reveals a left femoral neck fracture with superior placement of the femoral shaft. The bones are diffusely demineralized.  - Planned for OR 5/26  - CT H unremarkable  - Pain control   - Activity: WBAT LLE  - Abx: Ancef 2g q8hr x3 doses for a total of 24hrs post-operatively, ordered (received ancef preop with no reaction) -completed   - DVT PPx: Lovenox & SCDs  - Pain control  - Incentive spirometry   - PT    #New fever on 5/26- resolved  - Could be reactive due to the fracture  - No noted phlebitis  - Duplex US negative  - CXR no infiltrates or atelactesis  - RVP negative, Blood Cx NGTD  - Incentive spirometry    #Chronic neuropathic pain  - Started gabapentin on 5/30    #Knee pain- resolved  #Pseudogout  - Knee tapped in OR on 5/26  - Given dexamethasone 4mg intra-articular       77 y/o F with PMHx of HTN, HLD, TIA, breast cancer (s/p bilateral mastectomy, in remission) presents with pain in left hip s/p mechanical fall, Found to have Left acute femoral neck fracture.    Patient underwent arthroplasty on 5/26, dc on high dose aspirin for 6 weeks  Hb has trended down to 9 but no active source of bleed, monitor for now  She had knee pain and knee was tapped => pseudogout, given intra-articular steroids, resolved  She has chronic neuropathic pain, started low dose gabapentin    #Left acute femoral neck fracture  #Post op arthroplasty (OR on 5/26 PM)  #Anemia post-op  - Sp mechanical fall  - Xray pelvis 5/23: Examination reveals a left femoral neck fracture with superior placement of the femoral shaft. The bones are diffusely demineralized.  - Planned for OR 5/26  - CT H unremarkable  - Pain control   - Activity: WBAT LLE  - Abx: Ancef 2g q8hr x3 doses for a total of 24hrs post-operatively, ordered (received ancef preop with no reaction) -completed   - DVT PPx: Lovenox & SCDs  - Pain control  - Incentive spirometry   - PT    #New fever on 5/26- resolved  - Could be reactive due to the fracture  - No noted phlebitis  - Duplex US negative  - CXR no infiltrates or atelactesis  - RVP negative, Blood Cx NGTD  - Incentive spirometry    #Chronic neuropathic pain  - Started gabapentin on 5/30    #Knee pain- resolved  #Pseudogout  - Knee tapped in OR on 5/26  - Given dexamethasone 4mg intra-articular       79 y/o F with PMHx of HTN, HLD, TIA, breast cancer (s/p bilateral mastectomy, in remission) presents with pain in left hip s/p mechanical fall, Found to have Left acute femoral neck fracture.    Patient underwent arthroplasty on 5/26, dc on high dose aspirin for 6 weeks  Hb has trended down to 9 but no active source of bleed, monitor for now  She had knee pain and knee was tapped => pseudogout, given intra-articular steroids, resolved  She has chronic neuropathic pain, started low dose gabapentin    #Left acute femoral neck fracture  #Post op arthroplasty (OR on 5/26 PM)  #Anemia post-op  #Acute DVT in the LLE   - Sp mechanical fall  - Xray pelvis 5/23: Examination reveals a left femoral neck fracture with superior placement of the femoral shaft. The bones are diffusely demineralized.  - Planned for OR 5/26  - CT H unremarkable  - Pain control   - Activity: WBAT LLE  - Abx: Ancef 2g q8hr x3 doses for a total of 24hrs post-operatively, ordered (received ancef preop with no reaction) -completed   - c/w Eliquis 10 mg BID for first 7 days followed by 5 mg BID onwards   - follow up with PCP in 2 weeks       #New fever on 5/26- resolved  - Could be reactive due to the fracture  - No noted phlebitis  - Duplex US negative  - CXR no infiltrates or atelactesis  - RVP negative, Blood Cx NGTD  - Incentive spirometry    #Chronic neuropathic pain  - Started gabapentin on 5/30    #Knee pain- resolved  #Pseudogout  - Knee tapped in OR on 5/26  - Given dexamethasone 4mg intra-articular    Patient is clinically stable to be discharged at this time.            79 y/o F with PMHx of HTN, HLD, TIA, breast cancer (s/p bilateral mastectomy, in remission) presents with pain in left hip s/p mechanical fall, Found to have Left acute femoral neck fracture.    Patient underwent arthroplasty on 5/26, dc on high dose aspirin for 6 weeks  Hb has trended down to 9 but no active source of bleed, monitor for now  She had knee pain and knee was tapped => pseudogout, given intra-articular steroids, resolved  She has chronic neuropathic pain, started low dose gabapentin    #Left acute femoral neck fracture sec  to mechanical fall  #Post op arthroplasty (OR on 5/26 PM)  #Anemia post-op  #Acute DVT in the LLE   - Xray Hip 2-3 Views, Left (05.23.23 @ 18:50) >Stable position of previously reported left femoral neck fracture.  - s/p L hip hemiarthroplasty on 5/26  - c/w pain meds  - WBAT LLE  - c/w eliquis  - c/w ferrous sulphate  - evaluated by physiatry     # Right knee pseudogout  - S/p  R knee arthrocentesis w/ CSI on 5/26    # H/o  TIA  - c/w ASA, statin      # Hypertension  - c/w metoprolol, enalapril    # Fever-resolved  - Xray Chest 1 View AP/PA (05.26.23 @ 06:40) >Cardiomegaly. Otherwise unremarkable study.  - UA: neg  - Rapid RVP Result: NotDetec (05.26.23 @ 13:30)  - blood Culture Results: No growth to date. (05.26.23 @ 07:02)  - [facility] Duplex Lower Ext Vein Scan, Bilat (05.26.23 @ 10:59) >No evidence of deep venous thrombosis in either lower extremity  - Incentive spirometry    # Neuropathy   - c/w  gabapentin      Patient is clinically stable to be discharged at this time.

## 2023-05-30 NOTE — PROGRESS NOTE ADULT - TIME BILLING
Direct patient care. Discussed on rounds with Housestaff
Direct patient care. discussed with Housestaff

## 2023-05-31 LAB
BASOPHILS # BLD AUTO: 0.02 K/UL — SIGNIFICANT CHANGE UP (ref 0–0.2)
BASOPHILS NFR BLD AUTO: 0.3 % — SIGNIFICANT CHANGE UP (ref 0–1)
CULTURE RESULTS: SIGNIFICANT CHANGE UP
CULTURE RESULTS: SIGNIFICANT CHANGE UP
EOSINOPHIL # BLD AUTO: 0.18 K/UL — SIGNIFICANT CHANGE UP (ref 0–0.7)
EOSINOPHIL NFR BLD AUTO: 2.5 % — SIGNIFICANT CHANGE UP (ref 0–8)
HCT VFR BLD CALC: 28.4 % — LOW (ref 37–47)
HGB BLD-MCNC: 9.7 G/DL — LOW (ref 12–16)
IMM GRANULOCYTES NFR BLD AUTO: 1 % — HIGH (ref 0.1–0.3)
LYMPHOCYTES # BLD AUTO: 1.78 K/UL — SIGNIFICANT CHANGE UP (ref 1.2–3.4)
LYMPHOCYTES # BLD AUTO: 25 % — SIGNIFICANT CHANGE UP (ref 20.5–51.1)
MCHC RBC-ENTMCNC: 31.4 PG — HIGH (ref 27–31)
MCHC RBC-ENTMCNC: 34.2 G/DL — SIGNIFICANT CHANGE UP (ref 32–37)
MCV RBC AUTO: 91.9 FL — SIGNIFICANT CHANGE UP (ref 81–99)
MONOCYTES # BLD AUTO: 0.78 K/UL — HIGH (ref 0.1–0.6)
MONOCYTES NFR BLD AUTO: 10.9 % — HIGH (ref 1.7–9.3)
NEUTROPHILS # BLD AUTO: 4.3 K/UL — SIGNIFICANT CHANGE UP (ref 1.4–6.5)
NEUTROPHILS NFR BLD AUTO: 60.3 % — SIGNIFICANT CHANGE UP (ref 42.2–75.2)
NRBC # BLD: 0 /100 WBCS — SIGNIFICANT CHANGE UP (ref 0–0)
PLATELET # BLD AUTO: 175 K/UL — SIGNIFICANT CHANGE UP (ref 130–400)
PMV BLD: 11 FL — HIGH (ref 7.4–10.4)
RBC # BLD: 3.09 M/UL — LOW (ref 4.2–5.4)
RBC # FLD: 13.5 % — SIGNIFICANT CHANGE UP (ref 11.5–14.5)
SPECIMEN SOURCE: SIGNIFICANT CHANGE UP
SPECIMEN SOURCE: SIGNIFICANT CHANGE UP
WBC # BLD: 7.13 K/UL — SIGNIFICANT CHANGE UP (ref 4.8–10.8)
WBC # FLD AUTO: 7.13 K/UL — SIGNIFICANT CHANGE UP (ref 4.8–10.8)

## 2023-05-31 PROCEDURE — 99232 SBSQ HOSP IP/OBS MODERATE 35: CPT

## 2023-05-31 RX ORDER — SENNA PLUS 8.6 MG/1
2 TABLET ORAL AT BEDTIME
Refills: 0 | Status: DISCONTINUED | OUTPATIENT
Start: 2023-05-31 | End: 2023-06-07

## 2023-05-31 RX ORDER — ASPIRIN/CALCIUM CARB/MAGNESIUM 324 MG
1 TABLET ORAL
Qty: 37 | Refills: 0
Start: 2023-05-31 | End: 2023-07-06

## 2023-05-31 RX ORDER — GABAPENTIN 400 MG/1
1 CAPSULE ORAL
Qty: 0 | Refills: 0 | DISCHARGE
Start: 2023-05-31

## 2023-05-31 RX ORDER — PANTOPRAZOLE SODIUM 20 MG/1
1 TABLET, DELAYED RELEASE ORAL
Qty: 0 | Refills: 0 | DISCHARGE
Start: 2023-05-31

## 2023-05-31 RX ORDER — SENNA PLUS 8.6 MG/1
2 TABLET ORAL
Qty: 0 | Refills: 0 | DISCHARGE
Start: 2023-05-31

## 2023-05-31 RX ORDER — POLYETHYLENE GLYCOL 3350 17 G/17G
17 POWDER, FOR SOLUTION ORAL
Qty: 0 | Refills: 0 | DISCHARGE
Start: 2023-05-31

## 2023-05-31 RX ADMIN — GABAPENTIN 100 MILLIGRAM(S): 400 CAPSULE ORAL at 05:25

## 2023-05-31 RX ADMIN — SENNA PLUS 2 TABLET(S): 8.6 TABLET ORAL at 21:16

## 2023-05-31 RX ADMIN — GABAPENTIN 100 MILLIGRAM(S): 400 CAPSULE ORAL at 21:16

## 2023-05-31 RX ADMIN — POLYETHYLENE GLYCOL 3350 17 GRAM(S): 17 POWDER, FOR SOLUTION ORAL at 17:08

## 2023-05-31 RX ADMIN — Medication 10 MILLIGRAM(S): at 05:25

## 2023-05-31 RX ADMIN — GABAPENTIN 100 MILLIGRAM(S): 400 CAPSULE ORAL at 14:36

## 2023-05-31 RX ADMIN — ENOXAPARIN SODIUM 40 MILLIGRAM(S): 100 INJECTION SUBCUTANEOUS at 22:55

## 2023-05-31 RX ADMIN — Medication 81 MILLIGRAM(S): at 11:29

## 2023-05-31 RX ADMIN — Medication 25 MILLIGRAM(S): at 05:25

## 2023-05-31 RX ADMIN — PANTOPRAZOLE SODIUM 40 MILLIGRAM(S): 20 TABLET, DELAYED RELEASE ORAL at 05:25

## 2023-05-31 RX ADMIN — SIMVASTATIN 20 MILLIGRAM(S): 20 TABLET, FILM COATED ORAL at 21:16

## 2023-05-31 RX ADMIN — POLYETHYLENE GLYCOL 3350 17 GRAM(S): 17 POWDER, FOR SOLUTION ORAL at 08:22

## 2023-05-31 NOTE — PROGRESS NOTE ADULT - SUBJECTIVE AND OBJECTIVE BOX
RUDY STINSON  78y  [facility]      Patient is a 78y old  Female who presents with a chief complaint of Left femoral neck fracture (30 May 2023 16:13)      INTERVAL HPI/OVERNIGHT EVENTS:    no acute events overnight     REVIEW OF SYSTEMS:  CONSTITUTIONAL: No fever, weight loss, or fatigue  EYES: No eye pain, visual disturbances, or discharge  ENMT:  No difficulty hearing, tinnitus, vertigo; No sinus or throat pain  NECK: No pain or stiffness  BREASTS: No pain, masses, or nipple discharge  RESPIRATORY: No cough, wheezing, chills or hemoptysis; No shortness of breath  CARDIOVASCULAR: No chest pain, palpitations, dizziness, or leg swelling  GASTROINTESTINAL: No abdominal or epigastric pain. No nausea, vomiting, or hematemesis; No diarrhea or constipation. No melena or hematochezia.  GENITOURINARY: No dysuria, frequency, hematuria, or incontinence  NEUROLOGICAL: No headaches, memory loss, loss of strength, numbness, or tremors  SKIN: No itching, burning, rashes, or lesions   LYMPH NODES: No enlarged glands  ENDOCRINE: No heat or cold intolerance; No hair loss  MUSCULOSKELETAL: No joint pain or swelling; No muscle, back, or extremity pain  PSYCHIATRIC: No depression, anxiety, mood swings, or difficulty sleeping  HEME/LYMPH: No easy bruising, or bleeding gums  ALLERY AND IMMUNOLOGIC: No hives or eczema  FAMILY HISTORY:  Family history of breast cancer (Mother)    Family history of rectal cancer (Father)  AND CHF      T(C): 36.7 (05-31-23 @ 08:20), Max: 37.1 (05-30-23 @ 15:20)  HR: 69 (05-31-23 @ 08:20) (69 - 87)  BP: 113/56 (05-31-23 @ 08:20) (113/56 - 131/58)  RR: 18 (05-31-23 @ 08:20) (18 - 18)  SpO2: 97% (05-31-23 @ 08:20) (96% - 97%)  Wt(kg): --Vital Signs Last 24 Hrs  T(C): 36.7 (31 May 2023 08:20), Max: 37.1 (30 May 2023 15:20)  T(F): 98 (31 May 2023 08:20), Max: 98.8 (30 May 2023 15:20)  HR: 69 (31 May 2023 08:20) (69 - 87)  BP: 113/56 (31 May 2023 08:20) (113/56 - 131/58)  BP(mean): --  RR: 18 (31 May 2023 08:20) (18 - 18)  SpO2: 97% (31 May 2023 08:20) (96% - 97%)    Parameters below as of 31 May 2023 08:20  Patient On (Oxygen Delivery Method): room air        PHYSICAL EXAM:  GENERAL: NAD, well-groomed, well-developed  HEAD:  Atraumatic, Normocephalic  EYES: EOMI, PERRLA, conjunctiva and sclera clear  ENMT: No tonsillar erythema, exudates, or enlargement; Moist mucous membranes, Good dentition, No lesions  NECK: Supple, No JVD, Normal thyroid  NERVOUS SYSTEM:  Alert & Oriented X3   PULM: Clear to auscultation bilaterally  CARDIAC: Regular rate and rhythm; No murmurs, rubs, or gallops  GI: Soft, Nontender, Nondistended; Bowel sounds present  EXTREMITIES:  2+ Peripheral Pulses, No clubbing, cyanosis, or edema ,  L hip site clean post operative   LYMPH: No lymphadenopathy noted  SKIN: No rashes or lesions    Consultant(s) Notes Reviewed:  [x ] YES  [ ] NO  Care Discussed with Consultants/Other Providers [ x] YES  [ ] NO    LABS:                            9.7    7.13  )-----------( 175      ( 31 May 2023 06:21 )             28.4         acetaminophen     Tablet .. 650 milliGRAM(s) Oral every 6 hours PRN  aluminum hydroxide/magnesium hydroxide/simethicone Suspension 30 milliLiter(s) Oral every 4 hours PRN  aspirin  chewable 81 milliGRAM(s) Oral daily  bisacodyl 5 milliGRAM(s) Oral daily  enalapril 10 milliGRAM(s) Oral daily  enoxaparin Injectable 40 milliGRAM(s) SubCutaneous every 24 hours  ferrous    sulfate 325 milliGRAM(s) Oral daily  gabapentin 100 milliGRAM(s) Oral three times a day  HYDROmorphone  Injectable 0.5 milliGRAM(s) IV Push every 10 minutes PRN  melatonin 3 milliGRAM(s) Oral at bedtime PRN  metoprolol tartrate 25 milliGRAM(s) Oral daily  morphine  - Injectable 2 milliGRAM(s) IV Push every 4 hours PRN  ondansetron Injectable 4 milliGRAM(s) IV Push every 8 hours PRN  pantoprazole    Tablet 40 milliGRAM(s) Oral before breakfast  polyethylene glycol 3350 17 Gram(s) Oral two times a day  senna 2 Tablet(s) Oral at bedtime  simvastatin 20 milliGRAM(s) Oral at bedtime      HEALTH ISSUES - PROBLEM Dx:          Case Discussed with House Staff   Spectra [phone]

## 2023-06-01 LAB
BASOPHILS # BLD AUTO: 0.04 K/UL — SIGNIFICANT CHANGE UP (ref 0–0.2)
BASOPHILS NFR BLD AUTO: 0.5 % — SIGNIFICANT CHANGE UP (ref 0–1)
EOSINOPHIL # BLD AUTO: 0.19 K/UL — SIGNIFICANT CHANGE UP (ref 0–0.7)
EOSINOPHIL NFR BLD AUTO: 2.6 % — SIGNIFICANT CHANGE UP (ref 0–8)
HCT VFR BLD CALC: 30.2 % — LOW (ref 37–47)
HGB BLD-MCNC: 10.1 G/DL — LOW (ref 12–16)
IMM GRANULOCYTES NFR BLD AUTO: 0.9 % — HIGH (ref 0.1–0.3)
LYMPHOCYTES # BLD AUTO: 1.9 K/UL — SIGNIFICANT CHANGE UP (ref 1.2–3.4)
LYMPHOCYTES # BLD AUTO: 25.6 % — SIGNIFICANT CHANGE UP (ref 20.5–51.1)
MCHC RBC-ENTMCNC: 30.3 PG — SIGNIFICANT CHANGE UP (ref 27–31)
MCHC RBC-ENTMCNC: 33.4 G/DL — SIGNIFICANT CHANGE UP (ref 32–37)
MCV RBC AUTO: 90.7 FL — SIGNIFICANT CHANGE UP (ref 81–99)
MONOCYTES # BLD AUTO: 0.78 K/UL — HIGH (ref 0.1–0.6)
MONOCYTES NFR BLD AUTO: 10.5 % — HIGH (ref 1.7–9.3)
NEUTROPHILS # BLD AUTO: 4.43 K/UL — SIGNIFICANT CHANGE UP (ref 1.4–6.5)
NEUTROPHILS NFR BLD AUTO: 59.9 % — SIGNIFICANT CHANGE UP (ref 42.2–75.2)
NRBC # BLD: 0 /100 WBCS — SIGNIFICANT CHANGE UP (ref 0–0)
PLATELET # BLD AUTO: 192 K/UL — SIGNIFICANT CHANGE UP (ref 130–400)
PMV BLD: 11.1 FL — HIGH (ref 7.4–10.4)
RBC # BLD: 3.33 M/UL — LOW (ref 4.2–5.4)
RBC # FLD: 13.2 % — SIGNIFICANT CHANGE UP (ref 11.5–14.5)
WBC # BLD: 7.41 K/UL — SIGNIFICANT CHANGE UP (ref 4.8–10.8)
WBC # FLD AUTO: 7.41 K/UL — SIGNIFICANT CHANGE UP (ref 4.8–10.8)

## 2023-06-01 PROCEDURE — 99232 SBSQ HOSP IP/OBS MODERATE 35: CPT

## 2023-06-01 RX ADMIN — GABAPENTIN 100 MILLIGRAM(S): 400 CAPSULE ORAL at 05:54

## 2023-06-01 RX ADMIN — Medication 10 MILLIGRAM(S): at 05:54

## 2023-06-01 RX ADMIN — SENNA PLUS 2 TABLET(S): 8.6 TABLET ORAL at 21:21

## 2023-06-01 RX ADMIN — Medication 25 MILLIGRAM(S): at 05:53

## 2023-06-01 RX ADMIN — Medication 5 MILLIGRAM(S): at 11:33

## 2023-06-01 RX ADMIN — POLYETHYLENE GLYCOL 3350 17 GRAM(S): 17 POWDER, FOR SOLUTION ORAL at 05:54

## 2023-06-01 RX ADMIN — GABAPENTIN 100 MILLIGRAM(S): 400 CAPSULE ORAL at 21:22

## 2023-06-01 RX ADMIN — SIMVASTATIN 20 MILLIGRAM(S): 20 TABLET, FILM COATED ORAL at 21:22

## 2023-06-01 RX ADMIN — GABAPENTIN 100 MILLIGRAM(S): 400 CAPSULE ORAL at 14:01

## 2023-06-01 RX ADMIN — PANTOPRAZOLE SODIUM 40 MILLIGRAM(S): 20 TABLET, DELAYED RELEASE ORAL at 05:54

## 2023-06-01 RX ADMIN — Medication 81 MILLIGRAM(S): at 11:33

## 2023-06-01 NOTE — PROGRESS NOTE ADULT - SUBJECTIVE AND OBJECTIVE BOX
RUDY STINSON  78y  [facility]      Patient is a 78y old  Female who presents with a chief complaint of Left femoral neck fracture (01 Jun 2023 09:19)      INTERVAL HPI/OVERNIGHT EVENTS:  no acute events overnight       REVIEW OF SYSTEMS:  CONSTITUTIONAL: No fever, weight loss, or fatigue  EYES: No eye pain, visual disturbances, or discharge  ENMT:  No difficulty hearing, tinnitus, vertigo; No sinus or throat pain  NECK: No pain or stiffness  BREASTS: No pain, masses, or nipple discharge  RESPIRATORY: No cough, wheezing, chills or hemoptysis; No shortness of breath  CARDIOVASCULAR: No chest pain, palpitations, dizziness, or leg swelling  GASTROINTESTINAL: No abdominal or epigastric pain. No nausea, vomiting, or hematemesis; No diarrhea or constipation. No melena or hematochezia.  GENITOURINARY: No dysuria, frequency, hematuria, or incontinence  NEUROLOGICAL: No headaches, memory loss, loss of strength, numbness, or tremors  SKIN: No itching, burning, rashes, or lesions   LYMPH NODES: No enlarged glands  ENDOCRINE: No heat or cold intolerance; No hair loss  MUSCULOSKELETAL: No joint pain or swelling; No muscle, back, or extremity pain  PSYCHIATRIC: No depression, anxiety, mood swings, or difficulty sleeping  HEME/LYMPH: No easy bruising, or bleeding gums  ALLERY AND IMMUNOLOGIC: No hives or eczema  FAMILY HISTORY:  Family history of breast cancer (Mother)    Family history of rectal cancer (Father)  AND CHF      T(C): 36.6 (05-31-23 @ 23:22), Max: 36.6 (05-31-23 @ 23:22)  HR: 76 (06-01-23 @ 05:10) (76 - 89)  BP: 127/60 (06-01-23 @ 05:10) (115/53 - 127/60)  RR: 18 (05-31-23 @ 23:22) (18 - 18)  SpO2: 97% (05-31-23 @ 23:22) (97% - 97%)  Wt(kg): --Vital Signs Last 24 Hrs  T(C): 36.6 (31 May 2023 23:22), Max: 36.6 (31 May 2023 23:22)  T(F): 97.8 (31 May 2023 23:22), Max: 97.8 (31 May 2023 23:22)  HR: 76 (01 Jun 2023 05:10) (76 - 89)  BP: 127/60 (01 Jun 2023 05:10) (115/53 - 127/60)  BP(mean): --  RR: 18 (31 May 2023 23:22) (18 - 18)  SpO2: 97% (31 May 2023 23:22) (97% - 97%)    Parameters below as of 31 May 2023 23:22  Patient On (Oxygen Delivery Method): room air        PHYSICAL EXAM:  GENERAL: NAD, well-groomed, well-developed  HEAD:  Atraumatic, Normocephalic  EYES: EOMI, PERRLA, conjunctiva and sclera clear  ENMT: No tonsillar erythema, exudates, or enlargement; Moist mucous membranes, Good dentition, No lesions  NECK: Supple, No JVD, Normal thyroid  NERVOUS SYSTEM:  Alert & Oriented X3, Good concentration; Motor Strength 5/5 B/L upper and lower extremities; DTRs 2+ intact and symmetric  PULM: Clear to auscultation bilaterally  CARDIAC: Regular rate and rhythm; No murmurs, rubs, or gallops  GI: Soft, Nontender, Nondistended; Bowel sounds present  EXTREMITIES:  2+ Peripheral Pulses, No clubbing, cyanosis, or edema  LYMPH: No lymphadenopathy noted  SKIN: No rashes or lesions    Consultant(s) Notes Reviewed:  [x ] YES  [ ] NO  Care Discussed with Consultants/Other Providers [ x] YES  [ ] NO    LABS:                            10.1   7.41  )-----------( 192      ( 01 Jun 2023 06:19 )             30.2           acetaminophen     Tablet .. 650 milliGRAM(s) Oral every 6 hours PRN  aluminum hydroxide/magnesium hydroxide/simethicone Suspension 30 milliLiter(s) Oral every 4 hours PRN  aspirin  chewable 81 milliGRAM(s) Oral daily  bisacodyl 5 milliGRAM(s) Oral daily  enalapril 10 milliGRAM(s) Oral daily  enoxaparin Injectable 40 milliGRAM(s) SubCutaneous every 24 hours  gabapentin 100 milliGRAM(s) Oral three times a day  HYDROmorphone  Injectable 0.5 milliGRAM(s) IV Push every 10 minutes PRN  melatonin 3 milliGRAM(s) Oral at bedtime PRN  metoprolol tartrate 25 milliGRAM(s) Oral daily  morphine  - Injectable 2 milliGRAM(s) IV Push every 4 hours PRN  ondansetron Injectable 4 milliGRAM(s) IV Push every 8 hours PRN  pantoprazole    Tablet 40 milliGRAM(s) Oral before breakfast  polyethylene glycol 3350 17 Gram(s) Oral two times a day  senna 2 Tablet(s) Oral at bedtime  simvastatin 20 milliGRAM(s) Oral at bedtime      HEALTH ISSUES - PROBLEM Dx:          Case Discussed with House Staff   Spectra [phone]

## 2023-06-01 NOTE — PROGRESS NOTE ADULT - SUBJECTIVE AND OBJECTIVE BOX
RUDY STINSON 78y Female  MRN#: [id number]   Hospital Day: 9d    SUBJECTIVE  Patient is a 78y old Female who presents with a chief complaint of Left femoral neck fracture (31 May 2023 10:40)  Currently admitted to medicine with the primary diagnosis of Hip fracture      INTERVAL HPI AND OVERNIGHT EVENTS:  Patient was examined and seen at bedside. This morning she is resting comfortably in bed and reports no issues or overnight events.    OBJECTIVE  PAST MEDICAL & SURGICAL HISTORY  HTN (hypertension)    Hypercholesteremia    TIA (transient ischemic attack)  2016    Murmur    Cancer  BREAST   MASTECTOMY RIGHT  CHEMO [id number]    Breast CA  Left -1/2021    Pre-diabetes    H/O right mastectomy  1989    S/P excision of lipoma    H/O left mastectomy  2021    History of lung biopsy  3/2021      ALLERGIES:  penicillins (Swelling)    MEDICATIONS:  STANDING MEDICATIONS  aspirin  chewable 81 milliGRAM(s) Oral daily  bisacodyl 5 milliGRAM(s) Oral daily  enalapril 10 milliGRAM(s) Oral daily  enoxaparin Injectable 40 milliGRAM(s) SubCutaneous every 24 hours  gabapentin 100 milliGRAM(s) Oral three times a day  metoprolol tartrate 25 milliGRAM(s) Oral daily  pantoprazole    Tablet 40 milliGRAM(s) Oral before breakfast  polyethylene glycol 3350 17 Gram(s) Oral two times a day  senna 2 Tablet(s) Oral at bedtime  simvastatin 20 milliGRAM(s) Oral at bedtime    PRN MEDICATIONS  acetaminophen     Tablet .. 650 milliGRAM(s) Oral every 6 hours PRN  aluminum hydroxide/magnesium hydroxide/simethicone Suspension 30 milliLiter(s) Oral every 4 hours PRN  HYDROmorphone  Injectable 0.5 milliGRAM(s) IV Push every 10 minutes PRN  melatonin 3 milliGRAM(s) Oral at bedtime PRN  morphine  - Injectable 2 milliGRAM(s) IV Push every 4 hours PRN  ondansetron Injectable 4 milliGRAM(s) IV Push every 8 hours PRN      VITAL SIGNS: Last 24 Hours  T(C): 36.6 (31 May 2023 23:22), Max: 36.6 (31 May 2023 23:22)  T(F): 97.8 (31 May 2023 23:22), Max: 97.8 (31 May 2023 23:22)  HR: 76 (01 Jun 2023 05:10) (76 - 89)  BP: 127/60 (01 Jun 2023 05:10) (115/53 - 127/60)  BP(mean): --  RR: 18 (31 May 2023 23:22) (18 - 18)  SpO2: 97% (31 May 2023 23:22) (97% - 97%)    LABS:                        10.1   7.41  )-----------( 192      ( 01 Jun 2023 06:19 )             30.2         RADIOLOGY:          PHYSICAL EXAM:  CONSTITUTIONAL: No acute distress, well-developed, well-groomed, AAOx3  PULMONARY: Clear to auscultation bilaterally; no wheezes, rales, or rhonchi  CARDIOVASCULAR: Regular rate and rhythm; no murmurs, rubs, or gallops  GASTROINTESTINAL: Soft, non-tender, non-distended; bowel sounds present  MUSCULOSKELETAL: surgery site noted

## 2023-06-02 PROCEDURE — 99232 SBSQ HOSP IP/OBS MODERATE 35: CPT

## 2023-06-02 RX ADMIN — GABAPENTIN 100 MILLIGRAM(S): 400 CAPSULE ORAL at 22:16

## 2023-06-02 RX ADMIN — PANTOPRAZOLE SODIUM 40 MILLIGRAM(S): 20 TABLET, DELAYED RELEASE ORAL at 05:42

## 2023-06-02 RX ADMIN — Medication 5 MILLIGRAM(S): at 14:20

## 2023-06-02 RX ADMIN — SENNA PLUS 2 TABLET(S): 8.6 TABLET ORAL at 22:16

## 2023-06-02 RX ADMIN — Medication 81 MILLIGRAM(S): at 14:19

## 2023-06-02 RX ADMIN — ENOXAPARIN SODIUM 40 MILLIGRAM(S): 100 INJECTION SUBCUTANEOUS at 23:51

## 2023-06-02 RX ADMIN — GABAPENTIN 100 MILLIGRAM(S): 400 CAPSULE ORAL at 14:19

## 2023-06-02 RX ADMIN — ENOXAPARIN SODIUM 40 MILLIGRAM(S): 100 INJECTION SUBCUTANEOUS at 00:31

## 2023-06-02 RX ADMIN — Medication 10 MILLIGRAM(S): at 05:41

## 2023-06-02 RX ADMIN — Medication 25 MILLIGRAM(S): at 05:42

## 2023-06-02 RX ADMIN — SIMVASTATIN 20 MILLIGRAM(S): 20 TABLET, FILM COATED ORAL at 22:16

## 2023-06-02 RX ADMIN — GABAPENTIN 100 MILLIGRAM(S): 400 CAPSULE ORAL at 05:41

## 2023-06-02 NOTE — PROGRESS NOTE ADULT - SUBJECTIVE AND OBJECTIVE BOX
RUDY STINSON  78y  [facility]-[address]      Patient is a 78y old  Female who presents with a chief complaint of Left femoral neck fracture (02 Jun 2023 09:08)      INTERVAL HPI/OVERNIGHT EVENTS:    no events overnight     REVIEW OF SYSTEMS:  CONSTITUTIONAL: No fever, weight loss, or fatigue  EYES: No eye pain, visual disturbances, or discharge  ENMT:  No difficulty hearing, tinnitus, vertigo; No sinus or throat pain  NECK: No pain or stiffness  BREASTS: No pain, masses, or nipple discharge  RESPIRATORY: No cough, wheezing, chills or hemoptysis; No shortness of breath  CARDIOVASCULAR: No chest pain, palpitations, dizziness, or leg swelling  GASTROINTESTINAL: No abdominal or epigastric pain. No nausea, vomiting, or hematemesis; No diarrhea or constipation. No melena or hematochezia.  GENITOURINARY: No dysuria, frequency, hematuria, or incontinence  NEUROLOGICAL: No headaches, memory loss, loss of strength, numbness, or tremors  SKIN: No itching, burning, rashes, or lesions   LYMPH NODES: No enlarged glands  ENDOCRINE: No heat or cold intolerance; No hair loss  MUSCULOSKELETAL: No joint pain or swelling; No muscle, back, or extremity pain  PSYCHIATRIC: No depression, anxiety, mood swings, or difficulty sleeping  HEME/LYMPH: No easy bruising, or bleeding gums  ALLERY AND IMMUNOLOGIC: No hives or eczema  FAMILY HISTORY:  Family history of breast cancer (Mother)    Family history of rectal cancer (Father)  AND CHF      T(C): 36.8 (06-02-23 @ 08:00), Max: 37 (06-01-23 @ 15:45)  HR: 66 (06-02-23 @ 08:00) (66 - 87)  BP: 119/56 (06-02-23 @ 08:00) (94/51 - 121/58)  RR: 18 (06-02-23 @ 08:00) (18 - 18)  SpO2: 95% (06-01-23 @ 15:45) (95% - 95%)  Wt(kg): --Vital Signs Last 24 Hrs  T(C): 36.8 (02 Jun 2023 08:00), Max: 37 (01 Jun 2023 15:45)  T(F): 98.3 (02 Jun 2023 08:00), Max: 98.6 (01 Jun 2023 15:45)  HR: 66 (02 Jun 2023 08:00) (66 - 87)  BP: 119/56 (02 Jun 2023 08:00) (94/51 - 121/58)  BP(mean): --  RR: 18 (02 Jun 2023 08:00) (18 - 18)  SpO2: 95% (01 Jun 2023 15:45) (95% - 95%)    Parameters below as of 01 Jun 2023 15:45  Patient On (Oxygen Delivery Method): room air        PHYSICAL EXAM:  GENERAL: NAD, well-groomed, well-developed  HEAD:  Atraumatic, Normocephalic  EYES: EOMI, PERRLA, conjunctiva and sclera clear  ENMT: No tonsillar erythema, exudates, or enlargement; Moist mucous membranes, Good dentition, No lesions  NECK: Supple, No JVD, Normal thyroid  NERVOUS SYSTEM:  Alert & Oriented X3, Good concentration; Motor Strength 5/5 B/L upper and lower extremities; DTRs 2+ intact and symmetric  PULM: Clear to auscultation bilaterally  CARDIAC: Regular rate and rhythm; No murmurs, rubs, or gallops  GI: Soft, Nontender, Nondistended; Bowel sounds present  EXTREMITIES:  2+ Peripheral Pulses, No clubbing, cyanosis, or edema  LYMPH: No lymphadenopathy noted  SKIN: No rashes or lesions    Consultant(s) Notes Reviewed:  [x ] YES  [ ] NO  Care Discussed with Consultants/Other Providers [ x] YES  [ ] NO    LABS:                            10.1   7.41  )-----------( 192      ( 01 Jun 2023 06:19 )             30.2                 acetaminophen     Tablet .. 650 milliGRAM(s) Oral every 6 hours PRN  aluminum hydroxide/magnesium hydroxide/simethicone Suspension 30 milliLiter(s) Oral every 4 hours PRN  aspirin  chewable 81 milliGRAM(s) Oral daily  bisacodyl 5 milliGRAM(s) Oral daily  enalapril 10 milliGRAM(s) Oral daily  enoxaparin Injectable 40 milliGRAM(s) SubCutaneous every 24 hours  gabapentin 100 milliGRAM(s) Oral three times a day  HYDROmorphone  Injectable 0.5 milliGRAM(s) IV Push every 10 minutes PRN  melatonin 3 milliGRAM(s) Oral at bedtime PRN  metoprolol tartrate 25 milliGRAM(s) Oral daily  morphine  - Injectable 2 milliGRAM(s) IV Push every 4 hours PRN  ondansetron Injectable 4 milliGRAM(s) IV Push every 8 hours PRN  pantoprazole    Tablet 40 milliGRAM(s) Oral before breakfast  polyethylene glycol 3350 17 Gram(s) Oral two times a day  senna 2 Tablet(s) Oral at bedtime  simvastatin 20 milliGRAM(s) Oral at bedtime      HEALTH ISSUES - PROBLEM Dx:          Case Discussed with House Staff   Spectra [phone]

## 2023-06-03 LAB
APPEARANCE UR: CLEAR — SIGNIFICANT CHANGE UP
BACTERIA # UR AUTO: NEGATIVE — SIGNIFICANT CHANGE UP
BILIRUB UR-MCNC: NEGATIVE — SIGNIFICANT CHANGE UP
COLOR SPEC: YELLOW — SIGNIFICANT CHANGE UP
DIFF PNL FLD: NEGATIVE — SIGNIFICANT CHANGE UP
EPI CELLS # UR: 5 /HPF — SIGNIFICANT CHANGE UP (ref 0–5)
GLUCOSE UR QL: NEGATIVE — SIGNIFICANT CHANGE UP
HYALINE CASTS # UR AUTO: 4 /LPF — SIGNIFICANT CHANGE UP (ref 0–7)
KETONES UR-MCNC: NEGATIVE — SIGNIFICANT CHANGE UP
LEUKOCYTE ESTERASE UR-ACNC: ABNORMAL
NITRITE UR-MCNC: NEGATIVE — SIGNIFICANT CHANGE UP
PH UR: 5.5 — SIGNIFICANT CHANGE UP (ref 5–8)
PROT UR-MCNC: NEGATIVE — SIGNIFICANT CHANGE UP
RBC CASTS # UR COMP ASSIST: 0 /HPF — SIGNIFICANT CHANGE UP (ref 0–4)
SP GR SPEC: 1.02 — SIGNIFICANT CHANGE UP (ref 1.01–1.03)
UROBILINOGEN FLD QL: SIGNIFICANT CHANGE UP
WBC UR QL: 8 /HPF — HIGH (ref 0–5)

## 2023-06-03 PROCEDURE — 99232 SBSQ HOSP IP/OBS MODERATE 35: CPT

## 2023-06-03 RX ORDER — CEFPODOXIME PROXETIL 100 MG
100 TABLET ORAL EVERY 12 HOURS
Refills: 0 | Status: DISCONTINUED | OUTPATIENT
Start: 2023-06-03 | End: 2023-06-07

## 2023-06-03 RX ADMIN — SENNA PLUS 2 TABLET(S): 8.6 TABLET ORAL at 22:20

## 2023-06-03 RX ADMIN — PANTOPRAZOLE SODIUM 40 MILLIGRAM(S): 20 TABLET, DELAYED RELEASE ORAL at 06:19

## 2023-06-03 RX ADMIN — Medication 100 MILLIGRAM(S): at 13:03

## 2023-06-03 RX ADMIN — Medication 25 MILLIGRAM(S): at 06:20

## 2023-06-03 RX ADMIN — GABAPENTIN 100 MILLIGRAM(S): 400 CAPSULE ORAL at 22:20

## 2023-06-03 RX ADMIN — Medication 81 MILLIGRAM(S): at 13:02

## 2023-06-03 RX ADMIN — GABAPENTIN 100 MILLIGRAM(S): 400 CAPSULE ORAL at 13:02

## 2023-06-03 RX ADMIN — Medication 100 MILLIGRAM(S): at 17:25

## 2023-06-03 RX ADMIN — SIMVASTATIN 20 MILLIGRAM(S): 20 TABLET, FILM COATED ORAL at 22:20

## 2023-06-03 RX ADMIN — ENOXAPARIN SODIUM 40 MILLIGRAM(S): 100 INJECTION SUBCUTANEOUS at 23:50

## 2023-06-03 RX ADMIN — GABAPENTIN 100 MILLIGRAM(S): 400 CAPSULE ORAL at 06:20

## 2023-06-03 RX ADMIN — Medication 10 MILLIGRAM(S): at 06:19

## 2023-06-03 NOTE — PROGRESS NOTE ADULT - SUBJECTIVE AND OBJECTIVE BOX
RUDY STINSON  78y  [facility] [address] [address]      Patient is a 78y old  Female who presents with a chief complaint of Left femoral neck fracture (03 Jun 2023 09:23)      INTERVAL HPI/OVERNIGHT EVENTS:    no acute events overnight     REVIEW OF SYSTEMS:  CONSTITUTIONAL: No fever, weight loss, or fatigue  EYES: No eye pain, visual disturbances, or discharge  ENMT:  No difficulty hearing, tinnitus, vertigo; No sinus or throat pain  NECK: No pain or stiffness  BREASTS: No pain, masses, or nipple discharge  RESPIRATORY: No cough, wheezing, chills or hemoptysis; No shortness of breath  CARDIOVASCULAR: No chest pain, palpitations, dizziness, or leg swelling  GASTROINTESTINAL: No abdominal or epigastric pain. No nausea, vomiting, or hematemesis; No diarrhea or constipation. No melena or hematochezia.  GENITOURINARY: + Dysuria , No blood noted   NEUROLOGICAL: No headaches, memory loss, loss of strength, numbness, or tremors  SKIN: No itching, burning, rashes, or lesions   LYMPH NODES: No enlarged glands  ENDOCRINE: No heat or cold intolerance; No hair loss  MUSCULOSKELETAL: No joint pain or swelling; No muscle, back, or extremity pain  PSYCHIATRIC: No depression, anxiety, mood swings, or difficulty sleeping  HEME/LYMPH: No easy bruising, or bleeding gums  ALLERY AND IMMUNOLOGIC: No hives or eczema  FAMILY HISTORY:  Family history of breast cancer (Mother)    Family history of rectal cancer (Father)  AND CHF      T(C): 36.6 (06-03-23 @ 08:16), Max: 36.6 (06-02-23 @ 23:38)  HR: 72 (06-03-23 @ 08:16) (72 - 89)  BP: 132/60 (06-03-23 @ 08:16) (107/51 - 132/60)  RR: 18 (06-03-23 @ 08:16) (16 - 18)  SpO2: 96% (06-03-23 @ 06:17) (96% - 97%)  Wt(kg): --Vital Signs Last 24 Hrs  T(C): 36.6 (03 Jun 2023 08:16), Max: 36.6 (02 Jun 2023 23:38)  T(F): 97.8 (03 Jun 2023 08:16), Max: 97.9 (02 Jun 2023 23:38)  HR: 72 (03 Jun 2023 08:16) (72 - 89)  BP: 132/60 (03 Jun 2023 08:16) (107/51 - 132/60)  BP(mean): 73 (03 Jun 2023 06:17) (73 - 73)  RR: 18 (03 Jun 2023 08:16) (16 - 18)  SpO2: 96% (03 Jun 2023 06:17) (96% - 97%)    Parameters below as of 03 Jun 2023 06:17  Patient On (Oxygen Delivery Method): room air        PHYSICAL EXAM:  GENERAL: NAD,   HEAD:  Atraumatic, Normocephalic  EYES: EOMI, PERRLA, conjunctiva and sclera clear  ENMT: No tonsillar erythema, exudates, or enlargement; Moist mucous membranes, Good dentition, No lesions  NECK: Supple, No JVD, Normal thyroid  NERVOUS SYSTEM:  Alert & Oriented X3,   PULM: Clear to auscultation bilaterally  CARDIAC: Regular rate and rhythm; No murmurs, rubs, or gallops  GI: Soft, Nontender, Nondistended; Bowel sounds present  EXTREMITIES:  2+ Peripheral Pulses, No clubbing, cyanosis, or edema  LYMPH: No lymphadenopathy noted  SKIN: No rashes or lesions    Consultant(s) Notes Reviewed:  [x ] YES  [ ] NO  Care Discussed with Consultants/Other Providers [ x] YES  [ ] NO    LABS:            acetaminophen     Tablet .. 650 milliGRAM(s) Oral every 6 hours PRN  aluminum hydroxide/magnesium hydroxide/simethicone Suspension 30 milliLiter(s) Oral every 4 hours PRN  aspirin  chewable 81 milliGRAM(s) Oral daily  bisacodyl 5 milliGRAM(s) Oral daily  cefpodoxime 100 milliGRAM(s) Oral every 12 hours  enalapril 10 milliGRAM(s) Oral daily  enoxaparin Injectable 40 milliGRAM(s) SubCutaneous every 24 hours  gabapentin 100 milliGRAM(s) Oral three times a day  melatonin 3 milliGRAM(s) Oral at bedtime PRN  metoprolol tartrate 25 milliGRAM(s) Oral daily  ondansetron Injectable 4 milliGRAM(s) IV Push every 8 hours PRN  pantoprazole    Tablet 40 milliGRAM(s) Oral before breakfast  polyethylene glycol 3350 17 Gram(s) Oral two times a day  senna 2 Tablet(s) Oral at bedtime  simvastatin 20 milliGRAM(s) Oral at bedtime      HEALTH ISSUES - PROBLEM Dx:          Case Discussed with House Staff   Spectra [phone]

## 2023-06-03 NOTE — PROGRESS NOTE ADULT - SUBJECTIVE AND OBJECTIVE BOX
RUDY STINSON 78y Female  MRN#: [id number]   Hospital Day: 11d    SUBJECTIVE  Patient is a 78y old Female who presents with a chief complaint of Left femoral neck fracture (02 Jun 2023 09:58)  Currently admitted to medicine with the primary diagnosis of Hip fracture      INTERVAL HPI AND OVERNIGHT EVENTS:  Patient was examined and seen at bedside. This morning she is resting comfortably in bed and reports no issues or overnight events.    OBJECTIVE  PAST MEDICAL & SURGICAL HISTORY  HTN (hypertension)    Hypercholesteremia    TIA (transient ischemic attack)  2016    Murmur    Cancer  BREAST   MASTECTOMY RIGHT  CHEMO [id number]    Breast CA  Left -1/2021    Pre-diabetes    H/O right mastectomy  1989    S/P excision of lipoma    H/O left mastectomy  2021    History of lung biopsy  3/2021      ALLERGIES:  penicillins (Swelling)    MEDICATIONS:  STANDING MEDICATIONS  aspirin  chewable 81 milliGRAM(s) Oral daily  bisacodyl 5 milliGRAM(s) Oral daily  enalapril 10 milliGRAM(s) Oral daily  enoxaparin Injectable 40 milliGRAM(s) SubCutaneous every 24 hours  gabapentin 100 milliGRAM(s) Oral three times a day  metoprolol tartrate 25 milliGRAM(s) Oral daily  pantoprazole    Tablet 40 milliGRAM(s) Oral before breakfast  polyethylene glycol 3350 17 Gram(s) Oral two times a day  senna 2 Tablet(s) Oral at bedtime  simvastatin 20 milliGRAM(s) Oral at bedtime    PRN MEDICATIONS  acetaminophen     Tablet .. 650 milliGRAM(s) Oral every 6 hours PRN  aluminum hydroxide/magnesium hydroxide/simethicone Suspension 30 milliLiter(s) Oral every 4 hours PRN  melatonin 3 milliGRAM(s) Oral at bedtime PRN  ondansetron Injectable 4 milliGRAM(s) IV Push every 8 hours PRN      VITAL SIGNS: Last 24 Hours  T(C): 36.6 (03 Jun 2023 08:16), Max: 36.6 (02 Jun 2023 23:38)  T(F): 97.8 (03 Jun 2023 08:16), Max: 97.9 (02 Jun 2023 23:38)  HR: 72 (03 Jun 2023 08:16) (72 - 89)  BP: 132/60 (03 Jun 2023 08:16) (107/51 - 132/60)  BP(mean): 73 (03 Jun 2023 06:17) (73 - 73)  RR: 18 (03 Jun 2023 08:16) (16 - 18)  SpO2: 96% (03 Jun 2023 06:17) (96% - 97%)    LABS:                          RADIOLOGY:          PHYSICAL EXAM:  CONSTITUTIONAL: No acute distress, well-developed, well-groomed, AAOx3  PULMONARY: Clear to auscultation bilaterally; no wheezes, rales, or rhonchi  CARDIOVASCULAR: Regular rate and rhythm; no murmurs, rubs, or gallops  GASTROINTESTINAL: Soft, non-distended; bowel sounds present, lower abdominal tenderness  MUSCULOSKELETAL: surgery site noted

## 2023-06-04 LAB
ANION GAP SERPL CALC-SCNC: 14 MMOL/L — SIGNIFICANT CHANGE UP (ref 7–14)
BUN SERPL-MCNC: 14 MG/DL — SIGNIFICANT CHANGE UP (ref 10–20)
CALCIUM SERPL-MCNC: 8.6 MG/DL — SIGNIFICANT CHANGE UP (ref 8.4–10.5)
CHLORIDE SERPL-SCNC: 101 MMOL/L — SIGNIFICANT CHANGE UP (ref 98–110)
CO2 SERPL-SCNC: 20 MMOL/L — SIGNIFICANT CHANGE UP (ref 17–32)
CREAT SERPL-MCNC: 0.6 MG/DL — LOW (ref 0.7–1.5)
EGFR: 92 ML/MIN/1.73M2 — SIGNIFICANT CHANGE UP
GLUCOSE SERPL-MCNC: 116 MG/DL — HIGH (ref 70–99)
HCT VFR BLD CALC: 32.1 % — LOW (ref 37–47)
HGB BLD-MCNC: 10.6 G/DL — LOW (ref 12–16)
MCHC RBC-ENTMCNC: 30.3 PG — SIGNIFICANT CHANGE UP (ref 27–31)
MCHC RBC-ENTMCNC: 33 G/DL — SIGNIFICANT CHANGE UP (ref 32–37)
MCV RBC AUTO: 91.7 FL — SIGNIFICANT CHANGE UP (ref 81–99)
NRBC # BLD: 0 /100 WBCS — SIGNIFICANT CHANGE UP (ref 0–0)
PLATELET # BLD AUTO: 233 K/UL — SIGNIFICANT CHANGE UP (ref 130–400)
PMV BLD: 11.1 FL — HIGH (ref 7.4–10.4)
POTASSIUM SERPL-MCNC: 4.6 MMOL/L — SIGNIFICANT CHANGE UP (ref 3.5–5)
POTASSIUM SERPL-SCNC: 4.6 MMOL/L — SIGNIFICANT CHANGE UP (ref 3.5–5)
RBC # BLD: 3.5 M/UL — LOW (ref 4.2–5.4)
RBC # FLD: 13.2 % — SIGNIFICANT CHANGE UP (ref 11.5–14.5)
SODIUM SERPL-SCNC: 135 MMOL/L — SIGNIFICANT CHANGE UP (ref 135–146)
WBC # BLD: 6.7 K/UL — SIGNIFICANT CHANGE UP (ref 4.8–10.8)
WBC # FLD AUTO: 6.7 K/UL — SIGNIFICANT CHANGE UP (ref 4.8–10.8)

## 2023-06-04 PROCEDURE — 99232 SBSQ HOSP IP/OBS MODERATE 35: CPT

## 2023-06-04 RX ADMIN — GABAPENTIN 100 MILLIGRAM(S): 400 CAPSULE ORAL at 22:53

## 2023-06-04 RX ADMIN — SIMVASTATIN 20 MILLIGRAM(S): 20 TABLET, FILM COATED ORAL at 22:53

## 2023-06-04 RX ADMIN — Medication 5 MILLIGRAM(S): at 11:26

## 2023-06-04 RX ADMIN — Medication 10 MILLIGRAM(S): at 06:35

## 2023-06-04 RX ADMIN — Medication 81 MILLIGRAM(S): at 11:26

## 2023-06-04 RX ADMIN — Medication 100 MILLIGRAM(S): at 06:35

## 2023-06-04 RX ADMIN — PANTOPRAZOLE SODIUM 40 MILLIGRAM(S): 20 TABLET, DELAYED RELEASE ORAL at 06:35

## 2023-06-04 RX ADMIN — GABAPENTIN 100 MILLIGRAM(S): 400 CAPSULE ORAL at 06:35

## 2023-06-04 RX ADMIN — GABAPENTIN 100 MILLIGRAM(S): 400 CAPSULE ORAL at 14:21

## 2023-06-04 RX ADMIN — Medication 25 MILLIGRAM(S): at 06:35

## 2023-06-04 RX ADMIN — Medication 100 MILLIGRAM(S): at 18:08

## 2023-06-04 RX ADMIN — POLYETHYLENE GLYCOL 3350 17 GRAM(S): 17 POWDER, FOR SOLUTION ORAL at 06:37

## 2023-06-04 RX ADMIN — ENOXAPARIN SODIUM 40 MILLIGRAM(S): 100 INJECTION SUBCUTANEOUS at 22:53

## 2023-06-04 NOTE — PROGRESS NOTE ADULT - SUBJECTIVE AND OBJECTIVE BOX
RUDY STINSON  78y  [facility] [address]      Patient is a 78y old  Female who presents with a chief complaint of Left femoral neck fracture (03 Jun 2023 09:39)      INTERVAL HPI/OVERNIGHT EVENTS:    no acute events overnight     REVIEW OF SYSTEMS:  CONSTITUTIONAL: No fever, weight loss, or fatigue  EYES: No eye pain, visual disturbances, or discharge  ENMT:  No difficulty hearing, tinnitus, vertigo; No sinus or throat pain  NECK: No pain or stiffness  BREASTS: No pain, masses, or nipple discharge  RESPIRATORY: No cough, wheezing, chills or hemoptysis; No shortness of breath  CARDIOVASCULAR: No chest pain, palpitations, dizziness, or leg swelling  GASTROINTESTINAL: No abdominal or epigastric pain. No nausea, vomiting, or hematemesis; No diarrhea or constipation. No melena or hematochezia.  GENITOURINARY: No dysuria, frequency, hematuria, or incontinence  NEUROLOGICAL: No headaches, memory loss, loss of strength, numbness, or tremors  SKIN: No itching, burning, rashes, or lesions   LYMPH NODES: No enlarged glands  ENDOCRINE: No heat or cold intolerance; No hair loss  MUSCULOSKELETAL: No joint pain or swelling; No muscle, back, or extremity pain  PSYCHIATRIC: No depression, anxiety, mood swings, or difficulty sleeping  HEME/LYMPH: No easy bruising, or bleeding gums  ALLERY AND IMMUNOLOGIC: No hives or eczema  FAMILY HISTORY:  Family history of breast cancer (Mother)    Family history of rectal cancer (Father)  AND CHF      T(C): 36.2 (06-04-23 @ 09:55), Max: 36.6 (06-04-23 @ 00:23)  HR: 80 (06-04-23 @ 09:55) (80 - 91)  BP: 160/68 (06-04-23 @ 09:55) (131/60 - 160/68)  RR: 18 (06-04-23 @ 09:55) (18 - 18)  SpO2: 93% (06-03-23 @ 15:30) (93% - 93%)  Wt(kg): --Vital Signs Last 24 Hrs  T(C): 36.2 (04 Jun 2023 09:55), Max: 36.6 (04 Jun 2023 00:23)  T(F): 97.1 (04 Jun 2023 09:55), Max: 97.9 (04 Jun 2023 00:23)  HR: 80 (04 Jun 2023 09:55) (80 - 91)  BP: 160/68 (04 Jun 2023 09:55) (131/60 - 160/68)  BP(mean): --  RR: 18 (04 Jun 2023 09:55) (18 - 18)  SpO2: 93% (03 Jun 2023 15:30) (93% - 93%)    Parameters below as of 03 Jun 2023 15:30  Patient On (Oxygen Delivery Method): room air        PHYSICAL EXAM:  GENERAL: NAD, well-groomed, well-developed  HEAD:  Atraumatic, Normocephalic  EYES: EOMI, PERRLA, conjunctiva and sclera clear  ENMT: No tonsillar erythema, exudates, or enlargement; Moist mucous membranes, Good dentition, No lesions  NECK: Supple, No JVD, Normal thyroid  NERVOUS SYSTEM:  Alert & Oriented X3, Good concentration; Motor Strength 5/5 B/L upper and lower extremities; DTRs 2+ intact and symmetric  PULM: Clear to auscultation bilaterally  CARDIAC: Regular rate and rhythm; No murmurs, rubs, or gallops  GI: Soft, Nontender, Nondistended; Bowel sounds present  EXTREMITIES: L Hip site , clean   LYMPH: No lymphadenopathy noted  SKIN: No rashes or lesions    Consultant(s) Notes Reviewed:  [x ] YES  [ ] NO  Care Discussed with Consultants/Other Providers [ x] YES  [ ] NO    LABS:                            10.6   6.70  )-----------( 233      ( 04 Jun 2023 07:21 )             32.1   06-04    135  |  101  |  14  ----------------------------<  116<H>  4.6   |  20  |  0.6<L>    Ca    8.6      04 Jun 2023 07:21              acetaminophen     Tablet .. 650 milliGRAM(s) Oral every 6 hours PRN  aluminum hydroxide/magnesium hydroxide/simethicone Suspension 30 milliLiter(s) Oral every 4 hours PRN  aspirin  chewable 81 milliGRAM(s) Oral daily  bisacodyl 5 milliGRAM(s) Oral daily  cefpodoxime 100 milliGRAM(s) Oral every 12 hours  enalapril 10 milliGRAM(s) Oral daily  enoxaparin Injectable 40 milliGRAM(s) SubCutaneous every 24 hours  gabapentin 100 milliGRAM(s) Oral three times a day  melatonin 3 milliGRAM(s) Oral at bedtime PRN  metoprolol tartrate 25 milliGRAM(s) Oral daily  ondansetron Injectable 4 milliGRAM(s) IV Push every 8 hours PRN  pantoprazole    Tablet 40 milliGRAM(s) Oral before breakfast  polyethylene glycol 3350 17 Gram(s) Oral two times a day  senna 2 Tablet(s) Oral at bedtime  simvastatin 20 milliGRAM(s) Oral at bedtime      HEALTH ISSUES - PROBLEM Dx:          Case Discussed with House Staff   Spectra [phone]

## 2023-06-05 ENCOUNTER — APPOINTMENT (OUTPATIENT)
Dept: INFUSION THERAPY | Facility: CLINIC | Age: 78
End: 2023-06-05

## 2023-06-05 LAB
ANION GAP SERPL CALC-SCNC: 12 MMOL/L — SIGNIFICANT CHANGE UP (ref 7–14)
BLD GP AB SCN SERPL QL: SIGNIFICANT CHANGE UP
BUN SERPL-MCNC: 17 MG/DL — SIGNIFICANT CHANGE UP (ref 10–20)
CALCIUM SERPL-MCNC: 8.4 MG/DL — SIGNIFICANT CHANGE UP (ref 8.4–10.5)
CHLORIDE SERPL-SCNC: 103 MMOL/L — SIGNIFICANT CHANGE UP (ref 98–110)
CO2 SERPL-SCNC: 22 MMOL/L — SIGNIFICANT CHANGE UP (ref 17–32)
CREAT SERPL-MCNC: 0.7 MG/DL — SIGNIFICANT CHANGE UP (ref 0.7–1.5)
CULTURE RESULTS: SIGNIFICANT CHANGE UP
EGFR: 89 ML/MIN/1.73M2 — SIGNIFICANT CHANGE UP
GLUCOSE SERPL-MCNC: 106 MG/DL — HIGH (ref 70–99)
HCT VFR BLD CALC: 31.1 % — LOW (ref 37–47)
HGB BLD-MCNC: 10.5 G/DL — LOW (ref 12–16)
MCHC RBC-ENTMCNC: 31.2 PG — HIGH (ref 27–31)
MCHC RBC-ENTMCNC: 33.8 G/DL — SIGNIFICANT CHANGE UP (ref 32–37)
MCV RBC AUTO: 92.3 FL — SIGNIFICANT CHANGE UP (ref 81–99)
NRBC # BLD: 0 /100 WBCS — SIGNIFICANT CHANGE UP (ref 0–0)
PLATELET # BLD AUTO: 221 K/UL — SIGNIFICANT CHANGE UP (ref 130–400)
PMV BLD: 10.9 FL — HIGH (ref 7.4–10.4)
POTASSIUM SERPL-MCNC: 4.9 MMOL/L — SIGNIFICANT CHANGE UP (ref 3.5–5)
POTASSIUM SERPL-SCNC: 4.9 MMOL/L — SIGNIFICANT CHANGE UP (ref 3.5–5)
RBC # BLD: 3.37 M/UL — LOW (ref 4.2–5.4)
RBC # FLD: 13.4 % — SIGNIFICANT CHANGE UP (ref 11.5–14.5)
SODIUM SERPL-SCNC: 137 MMOL/L — SIGNIFICANT CHANGE UP (ref 135–146)
SPECIMEN SOURCE: SIGNIFICANT CHANGE UP
WBC # BLD: 7.4 K/UL — SIGNIFICANT CHANGE UP (ref 4.8–10.8)
WBC # FLD AUTO: 7.4 K/UL — SIGNIFICANT CHANGE UP (ref 4.8–10.8)

## 2023-06-05 PROCEDURE — 93970 EXTREMITY STUDY: CPT | Mod: 26

## 2023-06-05 PROCEDURE — 99232 SBSQ HOSP IP/OBS MODERATE 35: CPT

## 2023-06-05 RX ORDER — ENOXAPARIN SODIUM 100 MG/ML
80 INJECTION SUBCUTANEOUS EVERY 12 HOURS
Refills: 0 | Status: DISCONTINUED | OUTPATIENT
Start: 2023-06-05 | End: 2023-06-07

## 2023-06-05 RX ADMIN — Medication 25 MILLIGRAM(S): at 05:52

## 2023-06-05 RX ADMIN — Medication 100 MILLIGRAM(S): at 05:51

## 2023-06-05 RX ADMIN — SIMVASTATIN 20 MILLIGRAM(S): 20 TABLET, FILM COATED ORAL at 21:38

## 2023-06-05 RX ADMIN — Medication 81 MILLIGRAM(S): at 11:20

## 2023-06-05 RX ADMIN — GABAPENTIN 100 MILLIGRAM(S): 400 CAPSULE ORAL at 21:38

## 2023-06-05 RX ADMIN — Medication 100 MILLIGRAM(S): at 17:35

## 2023-06-05 RX ADMIN — PANTOPRAZOLE SODIUM 40 MILLIGRAM(S): 20 TABLET, DELAYED RELEASE ORAL at 05:52

## 2023-06-05 RX ADMIN — SENNA PLUS 2 TABLET(S): 8.6 TABLET ORAL at 21:38

## 2023-06-05 RX ADMIN — GABAPENTIN 100 MILLIGRAM(S): 400 CAPSULE ORAL at 11:21

## 2023-06-05 RX ADMIN — Medication 10 MILLIGRAM(S): at 05:51

## 2023-06-05 RX ADMIN — ENOXAPARIN SODIUM 80 MILLIGRAM(S): 100 INJECTION SUBCUTANEOUS at 21:51

## 2023-06-05 RX ADMIN — GABAPENTIN 100 MILLIGRAM(S): 400 CAPSULE ORAL at 05:52

## 2023-06-05 NOTE — CHART NOTE - NSCHARTNOTEFT_GEN_A_CORE
PACU ANESTHESIA ADMISSION NOTE      Procedure: ORIF, fracture, femoral neck  with cemented hemiarthroplasty, CPT code 63430      Post op diagnosis:  Left displaced femoral neck fracture        ____  Intubated  TV:______       Rate: ______      FiO2: ______    __x__  Patent Airway    _x___  Full return of protective reflexes    _x___  Full recovery from anesthesia / back to baseline     Vitals:   T:   98        R:   16               BP: 104/56                 Sat: 98%                  P: 74      Mental Status:  ___x_ Awake   _____ Alert   _____ Drowsy   _____ Sedated    Nausea/Vomiting:  _x__ NO  ______Yes,   See Post - Op Orders          Pain Scale (0-10):  _____    Treatment: ____ None    __x__ See Post - Op/PCA Orders    Post - Operative Fluids:   ____ Oral   __x__ See Post - Op Orders    Plan: Discharge:   ____Home       ___x__Floor     _____Critical Care    _____  Other:_________________    Comments:
Patient had LLE swelling > RLE swelling. Venous Duplex of bilateral LE showed a positive DVT in the Left gastrocnemius vein 0.2 cm from popliteal vein. Gastrocnemius vein is part of a deep venous system and should be treated with full anticoagulation if there is no contraindication. Patient in a setting of a recent hip surgery and immobility had an elevated risk for developing DVT.     Plan  - will start with Lovenox 80 mg BID ( 1 mg/kg)  - will send type and screen and monitor H&H
called  opt 2 for peer to peer , peer to peer MD was unavailable , left callback number
perla case will be moved to friday as per the primary surgeon,  as the OR can not at this time provide a reasonable start  time for this femoral neck fx needing a Total Hip Arthroplasty.  the primary team has been notified, the pt has been fed and made NPO for friday 5/26

## 2023-06-05 NOTE — PROGRESS NOTE ADULT - SUBJECTIVE AND OBJECTIVE BOX
RUDY STINSON 78y Female  MRN#: [id number]   CODE STATUS:__Full______    Hospital Day: 13d    Patient is currently admitted with the primary diagnosis of L femoral neck fracture.     SUBJECTIVE:    Patient seen and examined at bedside this am. Patient is stable and has no new issues. Patient's pain is well-controlled.     OBJECTIVE:  PAST MEDICAL & SURGICAL HISTORY:  HTN (hypertension)    Hypercholesteremia    TIA (transient ischemic attack)  2016    Murmur    Cancer  BREAST   MASTECTOMY RIGHT  CHEMO [id number]    Breast CA  Left -1/2021    Pre-diabetes    H/O right mastectomy  1989    S/P excision of lipoma    H/O left mastectomy  2021    History of lung biopsy  3/2021    ALLERGIES:  penicillins (Swelling)                       HOME MEDICATIONS:  Home Medications:  aspirin 81 mg oral tablet, chewable: 1 tab(s) orally once a day Start taking this on July 8 2023  (take the high dose aspirin in the meantime) (31 May 2023 15:40)  Calcium 500+D oral tablet, chewable: 1 tab(s) orally 2 times a day (23 May 2023 19:19)  enalapril 10 mg oral tablet: 1 tab(s) orally once a day (23 May 2023 19:19)  Fish Oil oral capsule:  (23 May 2023 19:19)  gabapentin 100 mg oral capsule: 1 cap(s) orally 3 times a day (31 May 2023 15:40)  metoprolol tartrate 25 mg oral tablet: 1 tab(s) orally once a day (23 May 2023 19:19)  pantoprazole 40 mg oral delayed release tablet: 1 tab(s) orally once a day (before a meal) (31 May 2023 15:40)  polyethylene glycol 3350 oral powder for reconstitution: 17 gram(s) orally 2 times a day (31 May 2023 15:40)  senna leaf extract oral tablet: 2 tab(s) orally once a day (at bedtime) (31 May 2023 15:40)  simvastatin 20 mg oral tablet: 1 tab(s) orally once a day (at bedtime) (23 May 2023 19:19)                           MEDICATIONS:  STANDING MEDICATIONS  aspirin  chewable 81 milliGRAM(s) Oral daily  bisacodyl 5 milliGRAM(s) Oral daily  cefpodoxime 100 milliGRAM(s) Oral every 12 hours  enalapril 10 milliGRAM(s) Oral daily  enoxaparin Injectable 40 milliGRAM(s) SubCutaneous every 24 hours  gabapentin 100 milliGRAM(s) Oral three times a day  metoprolol tartrate 25 milliGRAM(s) Oral daily  pantoprazole    Tablet 40 milliGRAM(s) Oral before breakfast  polyethylene glycol 3350 17 Gram(s) Oral two times a day  senna 2 Tablet(s) Oral at bedtime  simvastatin 20 milliGRAM(s) Oral at bedtime    PRN MEDICATIONS  acetaminophen     Tablet .. 650 milliGRAM(s) Oral every 6 hours PRN  aluminum hydroxide/magnesium hydroxide/simethicone Suspension 30 milliLiter(s) Oral every 4 hours PRN  melatonin 3 milliGRAM(s) Oral at bedtime PRN  ondansetron Injectable 4 milliGRAM(s) IV Push every 8 hours PRN      VITAL SIGNS: Last 24 Hours  T(C): 36.7 (05 Jun 2023 08:23), Max: 37.2 (05 Jun 2023 00:18)  T(F): 98 (05 Jun 2023 08:23), Max: 98.9 (05 Jun 2023 00:18)  HR: 66 (05 Jun 2023 08:23) (66 - 84)  BP: 106/55 (05 Jun 2023 08:23) (97/52 - 132/60)  BP(mean): --  RR: 18 (05 Jun 2023 08:23) (18 - 18)  SpO2: --      06-04-23 @ 07:01  -  06-05-23 @ 07:00  --------------------------------------------------------  IN: 0 mL / OUT: 500 mL / NET: -500 mL        LABS:                        10.5   7.40  )-----------( 221      ( 05 Jun 2023 06:47 )             31.1     06-05    137  |  103  |  17  ----------------------------<  106<H>  4.9   |  22  |  0.7    Ca    8.4      05 Jun 2023 06:47      RADIOLOGY:        PHYSICAL EXAM:  General: Resting comfortably in no acute painful distress  HEENT: Normocephalic, atraumatic  LUNGS: Clear to auscultation bilaterally, no wheezes, rales, rhonchi  HEART: S1S2 present, regular rate and rhythm, no murmurs, rubs, gallops  ABDOMEN: Soft, nontender, nondistended  EXT: LLE more edematous than RLE ; non-pitting, nonerythematous

## 2023-06-05 NOTE — PROGRESS NOTE ADULT - SUBJECTIVE AND OBJECTIVE BOX
RUDY STINSON  78y  [facility] 4[facility] [address]      Patient is a 78y old  Female who presents with a chief complaint of Left femoral neck fracture (04 Jun 2023 10:30)      INTERVAL HPI/OVERNIGHT EVENTS:    no acute events overnight     REVIEW OF SYSTEMS:  CONSTITUTIONAL: No fever, weight loss, or fatigue  EYES: No eye pain, visual disturbances, or discharge  ENMT:  No difficulty hearing, tinnitus, vertigo; No sinus or throat pain  NECK: No pain or stiffness  BREASTS: No pain, masses, or nipple discharge  RESPIRATORY: No cough, wheezing, chills or hemoptysis; No shortness of breath  CARDIOVASCULAR: No chest pain, palpitations, dizziness, or leg swelling  GASTROINTESTINAL: No abdominal or epigastric pain. No nausea, vomiting, or hematemesis; No diarrhea or constipation. No melena or hematochezia.  GENITOURINARY: No dysuria, frequency, hematuria, or incontinence  NEUROLOGICAL: No headaches, memory loss, loss of strength, numbness, or tremors  SKIN: No itching, burning, rashes, or lesions   LYMPH NODES: No enlarged glands  ENDOCRINE: No heat or cold intolerance; No hair loss  MUSCULOSKELETAL: No joint pain or swelling; No muscle, back, or extremity pain  PSYCHIATRIC: No depression, anxiety, mood swings, or difficulty sleeping  HEME/LYMPH: No easy bruising, or bleeding gums  ALLERY AND IMMUNOLOGIC: No hives or eczema  FAMILY HISTORY:  Family history of breast cancer (Mother)    Family history of rectal cancer (Father)  AND CHF      T(C): 36.7 (06-05-23 @ 08:23), Max: 37.2 (06-05-23 @ 00:18)  HR: 66 (06-05-23 @ 08:23) (66 - 84)  BP: 106/55 (06-05-23 @ 08:23) (97/52 - 132/60)  RR: 18 (06-05-23 @ 08:23) (18 - 18)  SpO2: --  Wt(kg): --Vital Signs Last 24 Hrs  T(C): 36.7 (05 Jun 2023 08:23), Max: 37.2 (05 Jun 2023 00:18)  T(F): 98 (05 Jun 2023 08:23), Max: 98.9 (05 Jun 2023 00:18)  HR: 66 (05 Jun 2023 08:23) (66 - 84)  BP: 106/55 (05 Jun 2023 08:23) (97/52 - 132/60)  BP(mean): --  RR: 18 (05 Jun 2023 08:23) (18 - 18)  SpO2: --        PHYSICAL EXAM:  GENERAL: NAD, well-groomed, well-developed  HEAD:  Atraumatic, Normocephalic  EYES: EOMI, PERRLA, conjunctiva and sclera clear  ENMT: No tonsillar erythema, exudates, or enlargement; Moist mucous membranes, Good dentition, No lesions  NECK: Supple, No JVD, Normal thyroid  NERVOUS SYSTEM:  Alert & Oriented X3,    PULM: Clear to auscultation bilaterally  CARDIAC: Regular rate and rhythm; No murmurs, rubs, or gallops  GI: Soft, Nontender, Nondistended; Bowel sounds present  EXTREMITIES:  2+ Peripheral Pulses, No clubbing, cyanosis, or edema  LYMPH: No lymphadenopathy noted  SKIN: No rashes or lesions    Consultant(s) Notes Reviewed:  [x ] YES  [ ] NO  Care Discussed with Consultants/Other Providers [ x] YES  [ ] NO    LABS:                            10.5   7.40  )-----------( 221      ( 05 Jun 2023 06:47 )             31.1   06-05    137  |  103  |  17  ----------------------------<  106<H>  4.9   |  22  |  0.7    Ca    8.4      05 Jun 2023 06:47              acetaminophen     Tablet .. 650 milliGRAM(s) Oral every 6 hours PRN  aluminum hydroxide/magnesium hydroxide/simethicone Suspension 30 milliLiter(s) Oral every 4 hours PRN  aspirin  chewable 81 milliGRAM(s) Oral daily  bisacodyl 5 milliGRAM(s) Oral daily  cefpodoxime 100 milliGRAM(s) Oral every 12 hours  enalapril 10 milliGRAM(s) Oral daily  enoxaparin Injectable 40 milliGRAM(s) SubCutaneous every 24 hours  gabapentin 100 milliGRAM(s) Oral three times a day  melatonin 3 milliGRAM(s) Oral at bedtime PRN  metoprolol tartrate 25 milliGRAM(s) Oral daily  ondansetron Injectable 4 milliGRAM(s) IV Push every 8 hours PRN  pantoprazole    Tablet 40 milliGRAM(s) Oral before breakfast  polyethylene glycol 3350 17 Gram(s) Oral two times a day  senna 2 Tablet(s) Oral at bedtime  simvastatin 20 milliGRAM(s) Oral at bedtime      HEALTH ISSUES - PROBLEM Dx:          Case Discussed with House Staff    Spectra [phone]

## 2023-06-06 LAB
ALBUMIN SERPL ELPH-MCNC: 3.1 G/DL — LOW (ref 3.5–5.2)
ALP SERPL-CCNC: 110 U/L — SIGNIFICANT CHANGE UP (ref 30–115)
ALT FLD-CCNC: 23 U/L — SIGNIFICANT CHANGE UP (ref 0–41)
ANION GAP SERPL CALC-SCNC: 11 MMOL/L — SIGNIFICANT CHANGE UP (ref 7–14)
AST SERPL-CCNC: 21 U/L — SIGNIFICANT CHANGE UP (ref 0–41)
BASOPHILS # BLD AUTO: 0.03 K/UL — SIGNIFICANT CHANGE UP (ref 0–0.2)
BASOPHILS NFR BLD AUTO: 0.4 % — SIGNIFICANT CHANGE UP (ref 0–1)
BILIRUB SERPL-MCNC: 0.3 MG/DL — SIGNIFICANT CHANGE UP (ref 0.2–1.2)
BUN SERPL-MCNC: 15 MG/DL — SIGNIFICANT CHANGE UP (ref 10–20)
CALCIUM SERPL-MCNC: 8.4 MG/DL — SIGNIFICANT CHANGE UP (ref 8.4–10.4)
CHLORIDE SERPL-SCNC: 101 MMOL/L — SIGNIFICANT CHANGE UP (ref 98–110)
CO2 SERPL-SCNC: 22 MMOL/L — SIGNIFICANT CHANGE UP (ref 17–32)
CREAT SERPL-MCNC: 0.6 MG/DL — LOW (ref 0.7–1.5)
EGFR: 92 ML/MIN/1.73M2 — SIGNIFICANT CHANGE UP
EOSINOPHIL # BLD AUTO: 0.18 K/UL — SIGNIFICANT CHANGE UP (ref 0–0.7)
EOSINOPHIL NFR BLD AUTO: 2.5 % — SIGNIFICANT CHANGE UP (ref 0–8)
GLUCOSE SERPL-MCNC: 109 MG/DL — HIGH (ref 70–99)
HCT VFR BLD CALC: 30.8 % — LOW (ref 37–47)
HGB BLD-MCNC: 10 G/DL — LOW (ref 12–16)
IMM GRANULOCYTES NFR BLD AUTO: 1.1 % — HIGH (ref 0.1–0.3)
LYMPHOCYTES # BLD AUTO: 1.84 K/UL — SIGNIFICANT CHANGE UP (ref 1.2–3.4)
LYMPHOCYTES # BLD AUTO: 25.7 % — SIGNIFICANT CHANGE UP (ref 20.5–51.1)
MAGNESIUM SERPL-MCNC: 2.1 MG/DL — SIGNIFICANT CHANGE UP (ref 1.8–2.4)
MCHC RBC-ENTMCNC: 30 PG — SIGNIFICANT CHANGE UP (ref 27–31)
MCHC RBC-ENTMCNC: 32.5 G/DL — SIGNIFICANT CHANGE UP (ref 32–37)
MCV RBC AUTO: 92.5 FL — SIGNIFICANT CHANGE UP (ref 81–99)
MONOCYTES # BLD AUTO: 0.6 K/UL — SIGNIFICANT CHANGE UP (ref 0.1–0.6)
MONOCYTES NFR BLD AUTO: 8.4 % — SIGNIFICANT CHANGE UP (ref 1.7–9.3)
NEUTROPHILS # BLD AUTO: 4.42 K/UL — SIGNIFICANT CHANGE UP (ref 1.4–6.5)
NEUTROPHILS NFR BLD AUTO: 61.9 % — SIGNIFICANT CHANGE UP (ref 42.2–75.2)
NRBC # BLD: 0 /100 WBCS — SIGNIFICANT CHANGE UP (ref 0–0)
PLATELET # BLD AUTO: 223 K/UL — SIGNIFICANT CHANGE UP (ref 130–400)
PMV BLD: 11.1 FL — HIGH (ref 7.4–10.4)
POTASSIUM SERPL-MCNC: 4.8 MMOL/L — SIGNIFICANT CHANGE UP (ref 3.5–5)
POTASSIUM SERPL-SCNC: 4.8 MMOL/L — SIGNIFICANT CHANGE UP (ref 3.5–5)
PROT SERPL-MCNC: 5.5 G/DL — LOW (ref 6–8)
RBC # BLD: 3.33 M/UL — LOW (ref 4.2–5.4)
RBC # FLD: 13.3 % — SIGNIFICANT CHANGE UP (ref 11.5–14.5)
SODIUM SERPL-SCNC: 134 MMOL/L — LOW (ref 135–146)
WBC # BLD: 7.15 K/UL — SIGNIFICANT CHANGE UP (ref 4.8–10.8)
WBC # FLD AUTO: 7.15 K/UL — SIGNIFICANT CHANGE UP (ref 4.8–10.8)

## 2023-06-06 PROCEDURE — 99233 SBSQ HOSP IP/OBS HIGH 50: CPT

## 2023-06-06 RX ORDER — ASPIRIN/CALCIUM CARB/MAGNESIUM 324 MG
1 TABLET ORAL
Qty: 0 | Refills: 0 | DISCHARGE

## 2023-06-06 RX ADMIN — PANTOPRAZOLE SODIUM 40 MILLIGRAM(S): 20 TABLET, DELAYED RELEASE ORAL at 06:03

## 2023-06-06 RX ADMIN — Medication 100 MILLIGRAM(S): at 17:25

## 2023-06-06 RX ADMIN — Medication 81 MILLIGRAM(S): at 11:15

## 2023-06-06 RX ADMIN — SIMVASTATIN 20 MILLIGRAM(S): 20 TABLET, FILM COATED ORAL at 21:48

## 2023-06-06 RX ADMIN — ENOXAPARIN SODIUM 80 MILLIGRAM(S): 100 INJECTION SUBCUTANEOUS at 08:20

## 2023-06-06 RX ADMIN — GABAPENTIN 100 MILLIGRAM(S): 400 CAPSULE ORAL at 06:03

## 2023-06-06 RX ADMIN — Medication 25 MILLIGRAM(S): at 06:04

## 2023-06-06 RX ADMIN — Medication 100 MILLIGRAM(S): at 06:04

## 2023-06-06 RX ADMIN — ENOXAPARIN SODIUM 80 MILLIGRAM(S): 100 INJECTION SUBCUTANEOUS at 21:47

## 2023-06-06 RX ADMIN — Medication 10 MILLIGRAM(S): at 06:03

## 2023-06-06 RX ADMIN — GABAPENTIN 100 MILLIGRAM(S): 400 CAPSULE ORAL at 13:00

## 2023-06-06 RX ADMIN — GABAPENTIN 100 MILLIGRAM(S): 400 CAPSULE ORAL at 21:47

## 2023-06-06 NOTE — PROGRESS NOTE ADULT - SUBJECTIVE AND OBJECTIVE BOX
RUDY STINSON 78y Female  MRN#: [id number]   CODE STATUS:__Full______    Hospital Day: 14d    Patient is currently admitted with the primary diagnosis of L femoral neck fracture.     SUBJECTIVE:    Patient seen and examined at bedside this am. Patient is stable and has no new issues. Patient had DVT on the LLE on [facility] duplex yesterday and was started on Lovenox therapeutic otherwise no overnight events.     OBJECTIVE:  PAST MEDICAL & SURGICAL HISTORY:  HTN (hypertension)    Hypercholesteremia    TIA (transient ischemic attack)  2016    Murmur    Cancer  BREAST   MASTECTOMY RIGHT  CHEMO [id number]    Breast CA  Left -1/2021    Pre-diabetes    H/O right mastectomy  1989    S/P excision of lipoma    H/O left mastectomy  2021    History of lung biopsy  3/2021                     ALLERGIES:  penicillins (Swelling)                        HOME MEDICATIONS:  Home Medications:  aspirin 81 mg oral tablet, chewable: 1 tab(s) orally once a day Start taking this on July 8 2023  (take the high dose aspirin in the meantime) (31 May 2023 15:40)  Calcium 500+D oral tablet, chewable: 1 tab(s) orally 2 times a day (23 May 2023 19:19)  enalapril 10 mg oral tablet: 1 tab(s) orally once a day (23 May 2023 19:19)  Fish Oil oral capsule:  (23 May 2023 19:19)  gabapentin 100 mg oral capsule: 1 cap(s) orally 3 times a day (31 May 2023 15:40)  metoprolol tartrate 25 mg oral tablet: 1 tab(s) orally once a day (23 May 2023 19:19)  pantoprazole 40 mg oral delayed release tablet: 1 tab(s) orally once a day (before a meal) (31 May 2023 15:40)  polyethylene glycol 3350 oral powder for reconstitution: 17 gram(s) orally 2 times a day (31 May 2023 15:40)  senna leaf extract oral tablet: 2 tab(s) orally once a day (at bedtime) (31 May 2023 15:40)  simvastatin 20 mg oral tablet: 1 tab(s) orally once a day (at bedtime) (23 May 2023 19:19)                           MEDICATIONS:  STANDING MEDICATIONS  aspirin  chewable 81 milliGRAM(s) Oral daily  bisacodyl 5 milliGRAM(s) Oral daily  cefpodoxime 100 milliGRAM(s) Oral every 12 hours  enalapril 10 milliGRAM(s) Oral daily  enoxaparin Injectable 80 milliGRAM(s) SubCutaneous every 12 hours  gabapentin 100 milliGRAM(s) Oral three times a day  metoprolol tartrate 25 milliGRAM(s) Oral daily  pantoprazole    Tablet 40 milliGRAM(s) Oral before breakfast  polyethylene glycol 3350 17 Gram(s) Oral two times a day  senna 2 Tablet(s) Oral at bedtime  simvastatin 20 milliGRAM(s) Oral at bedtime    PRN MEDICATIONS  acetaminophen     Tablet .. 650 milliGRAM(s) Oral every 6 hours PRN  aluminum hydroxide/magnesium hydroxide/simethicone Suspension 30 milliLiter(s) Oral every 4 hours PRN  melatonin 3 milliGRAM(s) Oral at bedtime PRN  ondansetron Injectable 4 milliGRAM(s) IV Push every 8 hours PRN                          VITAL SIGNS: Last 24 Hours  T(C): 36.7 (06 Jun 2023 08:15), Max: 36.8 (05 Jun 2023 23:35)  T(F): 98 (06 Jun 2023 08:15), Max: 98.3 (05 Jun 2023 23:35)  HR: 64 (06 Jun 2023 08:15) (64 - 83)  BP: 108/54 (06 Jun 2023 08:15) (107/56 - 114/55)  BP(mean): --  RR: 18 (06 Jun 2023 08:15) (18 - 18)  SpO2: 97% (06 Jun 2023 08:15) (97% - 97%)      06-05-23 @ 07:01  -  06-06-23 @ 07:00  --------------------------------------------------------  IN: 0 mL / OUT: 400 mL / NET: -400 mL    06-06-23 @ 07:01  -  06-06-23 @ 13:15  --------------------------------------------------------  IN: 720 mL / OUT: 0 mL / NET: 720 mL                                      LABS:                        10.0   7.15  )-----------( 223      ( 06 Jun 2023 05:57 )             30.8     06-06    134<L>  |  101  |  15  ----------------------------<  109<H>  4.8   |  22  |  0.6<L>    Ca    8.4      06 Jun 2023 05:57  Mg     2.1     06-06    TPro  5.5<L>  /  Alb  3.1<L>  /  TBili  0.3  /  DBili  x   /  AST  21  /  ALT  23  /  AlkPhos  110  06-06      RADIOLOGY:    < from: [facility] Duplex Lower Ext Vein Scan, Tony (06.05.23 @ 13:09) >  IMPRESSION:  DVT in the left gastrocnemius vein 0.2 cm from popliteal vein  Right leg has no dvt or superficial thrombosis .      --- End of Report ---      < end of copied text >      PHYSICAL EXAM:  General: Resting comfortably in no acute painful distress  HEENT: Normocephalic, atraumatic  LUNGS: Clear to auscultation bilaterally, no wheezes, rales, rhonchi  HEART: S1S2 present, regular rate and rhythm, no murmurs, rubs, gallops  ABDOMEN: Soft, nontender, nondistended  EXT: LLE more edematous > RLE

## 2023-06-06 NOTE — PROGRESS NOTE ADULT - REASON FOR ADMISSION
Left femoral neck fracture

## 2023-06-06 NOTE — PROGRESS NOTE ADULT - SUBJECTIVE AND OBJECTIVE BOX
RUDY STINSON  78y  [facility] [address]      Patient is a 78y old  Female who presents with a chief complaint of Left femoral neck fracture (05 Jun 2023 13:07)      INTERVAL HPI/OVERNIGHT EVENTS:    no acute overnight events, + DVT     REVIEW OF SYSTEMS:  CONSTITUTIONAL: No fever, weight loss, or fatigue  EYES: No eye pain, visual disturbances, or discharge  ENMT:  No difficulty hearing, tinnitus, vertigo; No sinus or throat pain  NECK: No pain or stiffness  BREASTS: No pain, masses, or nipple discharge  RESPIRATORY: No cough, wheezing, chills or hemoptysis; No shortness of breath  CARDIOVASCULAR: No chest pain, palpitations, dizziness, or leg swelling  GASTROINTESTINAL: No abdominal or epigastric pain. No nausea, vomiting, or hematemesis; No diarrhea or constipation. No melena or hematochezia.  GENITOURINARY: No dysuria, frequency, hematuria, or incontinence  NEUROLOGICAL: No headaches, memory loss, loss of strength, numbness, or tremors  SKIN: No itching, burning, rashes, or lesions   LYMPH NODES: No enlarged glands  ENDOCRINE: No heat or cold intolerance; No hair loss  MUSCULOSKELETAL: No joint pain or swelling; No muscle, back, or extremity pain  PSYCHIATRIC: No depression, anxiety, mood swings, or difficulty sleeping  HEME/LYMPH: No easy bruising, or bleeding gums  ALLERY AND IMMUNOLOGIC: No hives or eczema  FAMILY HISTORY:  Family history of breast cancer (Mother)    Family history of rectal cancer (Father)  AND CHF      T(C): 36.7 (06-06-23 @ 08:15), Max: 36.8 (06-05-23 @ 23:35)  HR: 64 (06-06-23 @ 08:15) (64 - 83)  BP: 108/54 (06-06-23 @ 08:15) (107/56 - 114/55)  RR: 18 (06-06-23 @ 08:15) (18 - 18)  SpO2: 97% (06-06-23 @ 08:15) (97% - 97%)  Wt(kg): --Vital Signs Last 24 Hrs  T(C): 36.7 (06 Jun 2023 08:15), Max: 36.8 (05 Jun 2023 23:35)  T(F): 98 (06 Jun 2023 08:15), Max: 98.3 (05 Jun 2023 23:35)  HR: 64 (06 Jun 2023 08:15) (64 - 83)  BP: 108/54 (06 Jun 2023 08:15) (107/56 - 114/55)  BP(mean): --  RR: 18 (06 Jun 2023 08:15) (18 - 18)  SpO2: 97% (06 Jun 2023 08:15) (97% - 97%)    Parameters below as of 06 Jun 2023 08:15  Patient On (Oxygen Delivery Method): room air        PHYSICAL EXAM:  GENERAL: NAD, well-groomed, well-developed  HEAD:  Atraumatic, Normocephalic  EYES: EOMI, PERRLA, conjunctiva and sclera clear  ENMT: No tonsillar erythema, exudates, or enlargement; Moist mucous membranes, Good dentition, No lesions  NECK: Supple, No JVD, Normal thyroid  NERVOUS SYSTEM:  Alert & Oriented X3, Good concentration; Motor Strength 5/5 B/L upper and lower extremities; DTRs 2+ intact and symmetric  PULM: Clear to auscultation bilaterally  CARDIAC: Regular rate and rhythm; No murmurs, rubs, or gallops  GI: Soft, Nontender, Nondistended; Bowel sounds present  EXTREMITIES:  2+ Peripheral Pulses, No clubbing, cyanosis, or edema  LYMPH: No lymphadenopathy noted  SKIN: No rashes or lesions    Consultant(s) Notes Reviewed:  [x ] YES  [ ] NO  Care Discussed with Consultants/Other Providers [ x] YES  [ ] NO    LABS:                            10.0   7.15  )-----------( 223      ( 06 Jun 2023 05:57 )             30.8   06-06    134<L>  |  101  |  15  ----------------------------<  109<H>  4.8   |  22  |  0.6<L>    Ca    8.4      06 Jun 2023 05:57  Mg     2.1     06-06    TPro  5.5<L>  /  Alb  3.1<L>  /  TBili  0.3  /  DBili  x   /  AST  21  /  ALT  23  /  AlkPhos  110  06-06            Culture - Urine (collected 03 Jun 2023 10:34)  Source: Clean Catch Clean Catch (Midstream)  Final Report (05 Jun 2023 17:01):    >=3 organisms. Probable collection contamination.      acetaminophen     Tablet .. 650 milliGRAM(s) Oral every 6 hours PRN  aluminum hydroxide/magnesium hydroxide/simethicone Suspension 30 milliLiter(s) Oral every 4 hours PRN  aspirin  chewable 81 milliGRAM(s) Oral daily  bisacodyl 5 milliGRAM(s) Oral daily  cefpodoxime 100 milliGRAM(s) Oral every 12 hours  enalapril 10 milliGRAM(s) Oral daily  enoxaparin Injectable 80 milliGRAM(s) SubCutaneous every 12 hours  gabapentin 100 milliGRAM(s) Oral three times a day  melatonin 3 milliGRAM(s) Oral at bedtime PRN  metoprolol tartrate 25 milliGRAM(s) Oral daily  ondansetron Injectable 4 milliGRAM(s) IV Push every 8 hours PRN  pantoprazole    Tablet 40 milliGRAM(s) Oral before breakfast  polyethylene glycol 3350 17 Gram(s) Oral two times a day  senna 2 Tablet(s) Oral at bedtime  simvastatin 20 milliGRAM(s) Oral at bedtime      HEALTH ISSUES - PROBLEM Dx:          Case Discussed with House Staff    Spectra [phone]

## 2023-06-06 NOTE — PROGRESS NOTE ADULT - ASSESSMENT
77 y/o F with PMHx of HTN, HLD, TIA, breast cancer (s/p bilateral mastectomy, in remission) presents with pain in left hip s/p mechanical fall, Found to have Left acute femoral neck fracture.     #Left acute femoral neck fracture  #Post op arthroplasty (OR on 5/26 PM)  #Anemia post-op  - Sp mechanical fall  - Xray pelvis 5/23: Examination reveals a left femoral neck fracture with superior placement of the femoral shaft. The bones are diffusely demineralized.  - Planned for OR 5/26  - CT H unremarkable  - Pain control   - Activity: WBAT LLE  - Abx: Ancef 2g q8hr x3 doses for a total of 24hrs post-operatively, ordered (received ancef preop with no reaction) -completed   - DVT PPx: Lovenox & SCDs  - Pain control  - Incentive spirometry   - PT  - Pending placement    #New fever on 5/26- resolved  - Could be reactive due to the fracture  - No noted phlebitis  - Duplex US negative  - CXR no infiltrates or atelactesis  - RVP negative, Blood Cx NGTD  - Incentive spirometry    #Chronic neuropathic pain- improved  - Started gabapentin on 5/30    #Knee pain- resolved  #Pseudogout  - Knee tapped in OR on 5/26  - Given dexamethasone 4mg intra-articular    #HTN   - on enalapril and metoprolol     #Hx of TIA  - c/w ASA and statin      #Misc  - DVT prophylaxis: Lovenox 40 OD  - GI prophylaxis: protonix  - Diet: DASH  - Activity: IAT
77 y/o F with PMHx of HTN, HLD, TIA, breast cancer (s/p bilateral mastectomy, in remission) presents with pain in left hip s/p mechanical fall, Found to have Left acute femoral neck fracture.     #Left acute femoral neck fracture  - Sp mechanical fall  - Xray pelvis 5/23: Examination reveals a left femoral neck fracture with superior placement of the femoral shaft. The bones are diffusely demineralized.  - Planned for OR 5/25  - CT H unremarkable  - Bed rest  - Pain control   - s/p Left femoral nerve block in the ED  - s/p 1 dose of Lovenox for DVT ppx,    #HTN   - on enalapril and metoprolol     #Hx of TIA  - c/w ASA and statin      #Misc  - DVT prophylaxis: Lovenox (holding AM dose), resume as per ortho post op  - GI prophylaxis: not indicated   - Diet: DASH, NPO after midnight   - Activity: Bed rest 
79 y/o F with PMHx of HTN, HLD, TIA, breast cancer (s/p bilateral mastectomy, in remission) presents with pain in left hip s/p mechanical fall, Found to have Left acute femoral neck fracture.     #Left acute femoral neck fracture  #Post op arthroplasty (OR on 5/26 PM)  #Anemia post-op- resolving  - Sp mechanical fall  - Xray pelvis 5/23: Examination reveals a left femoral neck fracture with superior placement of the femoral shaft. The bones are diffusely demineralized.  - Planned for OR 5/26  - CT H unremarkable  - Pain control   - Activity: WBAT LLE  - Abx: Ancef 2g q8hr x3 doses for a total of 24hrs post-operatively, ordered (received ancef preop with no reaction) -completed   - DVT PPx: Lovenox & SCDs  - Pain control  - Incentive spirometry   - PT  - Pending placement    #New fever on 5/26- resolved  - Could be reactive due to the fracture  - No noted phlebitis  - Duplex US negative  - CXR no infiltrates or atelactesis  - RVP negative, Blood Cx NGTD  - Incentive spirometry    #Chronic neuropathic pain- improved  - Started gabapentin on 5/30    #Knee pain- resolved  #Pseudogout  - Knee tapped in OR on 5/26  - Given dexamethasone 4mg intra-articular    #HTN   - on enalapril and metoprolol     #Hx of TIA  - c/w ASA and statin      #Misc  - DVT prophylaxis: Lovenox 40 OD  - GI prophylaxis: protonix  - Diet: DASH  - Activity: IAT
79 y/o F with PMHx of HTN, HLD, TIA, breast cancer (s/p bilateral mastectomy, in remission) presents with pain in left hip s/p mechanical fall, Found to have Left acute femoral neck fracture.     #Left acute femoral neck fracture  #Post op day 1 (OR on 5/26 PM)  - Sp mechanical fall  - Xray pelvis 5/23: Examination reveals a left femoral neck fracture with superior placement of the femoral shaft. The bones are diffusely demineralized.  - Planned for OR 5/26  - CT H unremarkable  - Pain control   - Activity: WBAT LLE  - Abx: Ancef 2g q8hr x3 doses for a total of 24hrs post-operatively, ordered (received ancef preop with no reaction) - to be completed today  - DVT PPx: Lovenox & SCDs  - Pain control  - Incentive spirometry   - PT    #New fever on 5/26  - Could be reactive due to the fracture  - No noted phlebitis  - Duplex US negative  - CXR no infiltrates or atelactesis  - RVP negative, Blood Cx NGTD  - Incentive spirometry    #Pseudogout  - Knee tapped in OR on 5/26  - Give dexamethasone 4mg intra-articular  - Will give a course of NSAIDs for 5 days    #HTN   - on enalapril and metoprolol     #Hx of TIA  - c/w ASA and statin      #Misc  - DVT prophylaxis: Lovenox (holding AM dose), resume as per ortho post op  - GI prophylaxis: protonix  - Diet: DASH  - Activity: IAT
79 y/o F with PMHx of HTN, HLD, TIA, breast cancer (s/p bilateral mastectomy, in remission) presents with pain in left hip s/p mechanical fall, Found to have Left acute femoral neck fracture.     PLAN    #Left acute femoral neck fracture  #Post op arthroplasty (OR on 5/26 PM)  #Anemia post-op- resolving  #DVT LLE   - Sp mechanical fall  - Xray pelvis 5/23: Examination reveals a left femoral neck fracture with superior placement of the femoral shaft. The bones are diffusely demineralized.  - Planned for OR 5/26  - CT H unremarkable  - Pain control   - Activity: WBAT LLE  - Abx: Ancef 2g q8hr x3 doses for a total of 24hrs post-operatively, ordered (received ancef preop with no reaction) -completed   - Incentive spirometry   - PT  - VA duplex 6/5: positive for L gastrocnemius DVT   - started on Lovenox 80 mg BID on 6/5   - Switch to Eliquis on discharge: 10 mg BID for 1 week; followed by 5 mg BID    - Pending placement      #Lower abdominal pain  #Urinary frequency  - Reported on 6/3  - No fever, no CVA tenderness  - Started on vantin on 6/3 ( day 3)       #New fever on 5/26- resolved  - Could be reactive due to the fracture  - No noted phlebitis  - Duplex US negative  - CXR no infiltrates or atelactesis  - RVP negative, Blood Cx NGTD  - Incentive spirometry    #Chronic neuropathic pain- improved  - Started gabapentin on 5/30    #Knee pain- resolved  #Pseudogout  - Knee tapped in OR on 5/26  - Given dexamethasone 4mg intra-articular    #HTN   - on enalapril and metoprolol     #Hx of TIA  - c/w ASA and statin      #Misc  - DVT prophylaxis: Lovenox 40 OD  - GI prophylaxis: protonix  - Diet: DASH  - Activity: IAT        
79 y/o F with PMHx of HTN, HLD, TIA, breast cancer (s/p bilateral mastectomy, in remission) presents with pain in left hip s/p mechanical fall, Found to have Left acute femoral neck fracture.     PLAN    #Left acute femoral neck fracture  #Post op arthroplasty (OR on 5/26 PM)  #Anemia post-op- resolving  - Sp mechanical fall  - Xray pelvis 5/23: Examination reveals a left femoral neck fracture with superior placement of the femoral shaft. The bones are diffusely demineralized.  - Planned for OR 5/26  - CT H unremarkable  - Pain control   - Activity: WBAT LLE  - Abx: Ancef 2g q8hr x3 doses for a total of 24hrs post-operatively, ordered (received ancef preop with no reaction) -completed   - DVT PPx: Lovenox & SCDs  - Incentive spirometry   - PT  - f/u duplex bilateral LE ( r/o DVT as LLE more swollen > RLE in  a setting of recent hip surgery and immobility)   - Pending placement      #Lower abdominal pain  #Urinary frequency  - Reported on 6/3  - No fever, no CVA tenderness  - Started on vantin on 6/3 ( day 3)       #New fever on 5/26- resolved  - Could be reactive due to the fracture  - No noted phlebitis  - Duplex US negative  - CXR no infiltrates or atelactesis  - RVP negative, Blood Cx NGTD  - Incentive spirometry    #Chronic neuropathic pain- improved  - Started gabapentin on 5/30    #Knee pain- resolved  #Pseudogout  - Knee tapped in OR on 5/26  - Given dexamethasone 4mg intra-articular    #HTN   - on enalapril and metoprolol     #Hx of TIA  - c/w ASA and statin      #Misc  - DVT prophylaxis: Lovenox 40 OD  - GI prophylaxis: protonix  - Diet: DASH  - Activity: IAT  
This is a 77 y/o F with PMHx of HTN, HLD, TIA, breast cancer (s/p bilateral mastectomy, in remission) presents with pain in left hip s/p mechanical ground level fall this afternoon. Patient reports she tripped and fell while at home in her bedroom while trying to put on her pants. Reports head trauma but denies LOC. Reports left hip pain, but denies pain elsewhere.     # Left femoral neck fracture sec to  mechanical fall  - Xray Hip 2-3 Views, Left (05.23.23 @ 18:50) >Stable position of previously reported left femoral neck fracture.  - planned for L hip hemiarthroplasty today  - c/w pain meds    # H/o  TIA  - c/w ASA, statin      # Hypertension  - c/w metoprolol, enalapril    # Fever  - Check UA    # DVT prophylaxis    # Full code    #Pending: L hip hemiarthroplasty today, F/u UA  # Discussed plan of care with patient  # Disposition: probably STR  
This is a 79 y/o F with PMHx of HTN, HLD, TIA, breast cancer (s/p bilateral mastectomy, in remission) presents with pain in left hip s/p mechanical ground level fall this afternoon. Patient reports she tripped and fell while at home in her bedroom while trying to put on her pants. Reports head trauma but denies LOC. Reports left hip pain, but denies pain elsewhere.     # Left femoral neck fracture sec to  mechanical fall   s/p L hip hemiarthroplasty on 5/26  continue with PT   aspirin 325 mg q24h x 6 weeks    # Right knee pseudogout  - S/p  R knee arthrocentesis w/ CSI on 5/26, cultures negative     # H/o  TIA   ASA, statin      # Hypertension  BP: 127/60 (01 Jun 2023 05:10) (115/53 - 127/60)  controlled    # Fever-resolved    #Acute on chronic anemia secondary to intraoperative blood loss   no indication for transfusion   hemoglobin stabilized     # Neuropathy   - start gabapentin    # Constipation- resolved    #Overweight BMI 29 patient needs to see dieitian outpatient for further evaluation     # DVT prophylaxis    # Full code    PROGRESS NOTE HANDOFF    Pending:  dispo planning     Family discussion: patient verbalized understanding and agreeable to plan of care     Disposition: Rehab 
This is a 79 y/o F with PMHx of HTN, HLD, TIA, breast cancer (s/p bilateral mastectomy, in remission) presents with pain in left hip s/p mechanical ground level fall this afternoon. Patient reports she tripped and fell while at home in her bedroom while trying to put on her pants. Reports head trauma but denies LOC. Reports left hip pain, but denies pain elsewhere.     # Left femoral neck fracture sec to  mechanical fall   s/p L hip hemiarthroplasty on 5/26  continue with PT   aspirin 325 mg q24h x 6 weeks    # Right knee pseudogout  - S/p  R knee arthrocentesis w/ CSI on 5/26, cultures negative     # H/o  TIA   ASA, statin      # Hypertension  BP: 132/60 (03 Jun 2023 08:16) (107/51 - 132/60)  controlled    #Acute cystitis UA / Urine culture and start vantin 100 mg q12h x 7 days     #Acute on chronic anemia secondary to intraoperative blood loss   no indication for transfusion   hemoglobin stabilized     # Neuropathy     gabapentin    #Overweight BMI 29 patient needs to see dieitian outpatient for further evaluation     # DVT prophylaxis    # Full code    PROGRESS NOTE HANDOFF    Pending:  dispo planning     Family discussion: patient verbalized understanding and agreeable to plan of care     Disposition: Rehab 
79 y/o F with PMHx of HTN, HLD, TIA, breast cancer (s/p bilateral mastectomy, in remission) presents with pain in left hip s/p mechanical fall, Found to have Left acute femoral neck fracture.     #Left acute femoral neck fracture  #Post op arthroplasty (OR on 5/26 PM)  #Anemia post-op- resolving  - Sp mechanical fall  - Xray pelvis 5/23: Examination reveals a left femoral neck fracture with superior placement of the femoral shaft. The bones are diffusely demineralized.  - Planned for OR 5/26  - CT H unremarkable  - Pain control   - Activity: WBAT LLE  - Abx: Ancef 2g q8hr x3 doses for a total of 24hrs post-operatively, ordered (received ancef preop with no reaction) -completed   - DVT PPx: Lovenox & SCDs  - Pain control  - Incentive spirometry   - PT  - Pending placement      #Lower abdominal pain  #Urinary frequency  - Reported on 6/3  - No fever, no CVA tenderness  - Will get UA and Cx  - Start vantin after studies are taken    #New fever on 5/26- resolved  - Could be reactive due to the fracture  - No noted phlebitis  - Duplex US negative  - CXR no infiltrates or atelactesis  - RVP negative, Blood Cx NGTD  - Incentive spirometry    #Chronic neuropathic pain- improved  - Started gabapentin on 5/30    #Knee pain- resolved  #Pseudogout  - Knee tapped in OR on 5/26  - Given dexamethasone 4mg intra-articular    #HTN   - on enalapril and metoprolol     #Hx of TIA  - c/w ASA and statin      #Misc  - DVT prophylaxis: Lovenox 40 OD  - GI prophylaxis: protonix  - Diet: DASH  - Activity: IAT
This is a 77 y/o F with PMHx of HTN, HLD, TIA, breast cancer (s/p bilateral mastectomy, in remission) presents with pain in left hip s/p mechanical ground level fall this afternoon. Patient reports she tripped and fell while at home in her bedroom while trying to put on her pants. Reports head trauma but denies LOC. Reports left hip pain, but denies pain elsewhere.     # Left femoral neck fracture sec to  mechanical fall   s/p L hip hemiarthroplasty on 5/26  continue with PT   aspirin 325 mg q24h x 6 weeks    # Right knee pseudogout  - S/p  R knee arthrocentesis w/ CSI on 5/26, cultures negative     # H/o  TIA   ASA, statin      # Hypertension  BP: 127/60 (01 Jun 2023 05:10) (115/53 - 127/60)  controlled    # Fever-resolved    #Acute on chronic anemia secondary to intraoperative blood loss   no indication for transfusion   hemoglobin stabilized     # Neuropathy     gabapentin    #Overweight BMI 29 patient needs to see dieitian outpatient for further evaluation     # DVT prophylaxis    # Full code    PROGRESS NOTE HANDOFF    Pending:  dispo planning     Family discussion: patient verbalized understanding and agreeable to plan of care     Disposition: Rehab 
This is a 77 y/o F with PMHx of HTN, HLD, TIA, breast cancer (s/p bilateral mastectomy, in remission) presents with pain in left hip s/p mechanical ground level fall this afternoon. Patient reports she tripped and fell while at home in her bedroom while trying to put on her pants. Reports head trauma but denies LOC. Reports left hip pain, but denies pain elsewhere.     # Left femoral neck fracture sec to  mechanical fall  - Xray Hip 2-3 Views, Left (05.23.23 @ 18:50) >Stable position of previously reported left femoral neck fracture.  - planned for L hip hemiarthroplasty today  - c/w pain meds    # H/o  TIA  - c/w ASA, statin      # Hypertension  - c/w metoprolol, enalapril    # DVT prophylaxis    # Full code    #Pending: L hip hemiarthroplasty today  # Discussed plan of care with patient  # Disposition: probably STR  
This is a 79 y/o F with PMHx of HTN, HLD, TIA, breast cancer (s/p bilateral mastectomy, in remission) presents with pain in left hip s/p mechanical ground level fall this afternoon. Patient reports she tripped and fell while at home in her bedroom while trying to put on her pants. Reports head trauma but denies LOC. Reports left hip pain, but denies pain elsewhere.     # Left femoral neck fracture sec to  mechanical fall  - Xray Hip 2-3 Views, Left (05.23.23 @ 18:50) >Stable position of previously reported left femoral neck fracture.  - s/p L hip hemiarthroplasty on 5/26  - c/w pain meds  - WBAT LLE  -evaluated by physiatry -> 4 A    # Right knee pseudogout  - S/p  R knee arthrocentesis w/ CSI on 5/26    # H/o  TIA  - c/w ASA, statin      # Hypertension  - c/w metoprolol, enalapril    # Fever-resolved  - Xray Chest 1 View AP/PA (05.26.23 @ 06:40) >Cardiomegaly. Otherwise unremarkable study.  - UA: neg  - Rapid RVP Result: NotDetec (05.26.23 @ 13:30)  - blood Culture Results: No growth to date. (05.26.23 @ 07:02)  - VA Duplex Lower Ext Vein Scan, Bilat (05.26.23 @ 10:59) >No evidence of deep venous thrombosis in either lower extremity  - Incentive spirometry    # Anemia  - Start ferrous sulphate    # Neuropathy   - start gabapentin    # Constipation- resolved  - c/w Bisacodyl, senna, start lactulose    # DVT prophylaxis    # Full code    # Pending: auth for [facility] rehab , F/u covid  # Discussed plan of care with patient  # Disposition:[facility] rehab   
This is a 79 y/o F with PMHx of HTN, HLD, TIA, breast cancer (s/p bilateral mastectomy, in remission) presents with pain in left hip s/p mechanical ground level fall this afternoon. Patient reports she tripped and fell while at home in her bedroom while trying to put on her pants. Reports head trauma but denies LOC. Reports left hip pain, but denies pain elsewhere.     # Left femoral neck fracture sec to  mechanical fall  - Xray Hip 2-3 Views, Left (05.23.23 @ 18:50) >Stable position of previously reported left femoral neck fracture.  - s/p L hip hemiarthroplasty on 5/26  - c/w pain meds  - WBAT LLE  -evaluated by physiatry -> 4 A  - PT eval    # Right knee pseudogout  - S/p  R knee arthrocentesis w/ CSI on 5/26    # H/o  TIA  - c/w ASA, statin      # Hypertension  - c/w metoprolol, enalapril    # Fever-resolved  - Xray Chest 1 View AP/PA (05.26.23 @ 06:40) >Cardiomegaly. Otherwise unremarkable study.  - UA: neg  - Rapid RVP Result: NotDetec (05.26.23 @ 13:30)  - blood Culture Results: No growth to date. (05.26.23 @ 07:02)  - VA Duplex Lower Ext Vein Scan, Bilat (05.26.23 @ 10:59) >No evidence of deep venous thrombosis in either lower extremity  - Incentive spirometry    # Constipation  - start Bisacodyl, senna    # DVT prophylaxis    # Full code    # Pending: Pt eval  # Discussed plan of care with patient  # Disposition: probably STR  
77 y/o F with PMHx of HTN, HLD, TIA, breast cancer (s/p bilateral mastectomy, in remission) presents with pain in left hip s/p mechanical fall, Found to have Left acute femoral neck fracture.     #Left acute femoral neck fracture  #Post op arthroplasty (OR on 5/26 PM)  - Sp mechanical fall  - Xray pelvis 5/23: Examination reveals a left femoral neck fracture with superior placement of the femoral shaft. The bones are diffusely demineralized.  - Planned for OR 5/26  - CT H unremarkable  - Pain control   - Activity: WBAT LLE  - Abx: Ancef 2g q8hr x3 doses for a total of 24hrs post-operatively, ordered (received ancef preop with no reaction) -completed   - DVT PPx: Lovenox & SCDs  - Pain control  - Incentive spirometry   - PT    #New fever on 5/26- resolved  - Could be reactive due to the fracture  - No noted phlebitis  - Duplex US negative  - CXR no infiltrates or atelactesis  - RVP negative, Blood Cx NGTD  - Incentive spirometry    #Knee pain- resolved  #Pseudogout  - Knee tapped in OR on 5/26  - Given dexamethasone 4mg intra-articular    #HTN   - on enalapril and metoprolol     #Hx of TIA  - c/w ASA and statin      #Misc  - DVT prophylaxis: Lovenox 40 OD  - GI prophylaxis: protonix  - Diet: DASH  - Activity: IAT
77 y/o F with PMHx of HTN, HLD, TIA, breast cancer (s/p bilateral mastectomy, in remission) presents with pain in left hip s/p mechanical fall, Found to have Left acute femoral neck fracture.     #Left acute femoral neck fracture  - Sp mechanical fall  - Xray pelvis 5/23: Examination reveals a left femoral neck fracture with superior placement of the femoral shaft. The bones are diffusely demineralized.  - Planned for OR 5/26  - CT H unremarkable  - Bed rest  - Pain control   - s/p Left femoral nerve block in the ED  - s/p 1 dose of Lovenox for DVT pp daily and hold on day of OR    #New fever on 5/26  - Could be reactive due to the fracture  - No noted phlebitis  - US negative  - CXR no infiltrates or atelactesis  - Fu RVP, Blood Cx  - Fu Duplex   - Incentive spirometry    #HTN   - on enalapril and metoprolol     #Hx of TIA  - c/w ASA and statin      #Misc  - DVT prophylaxis: Lovenox (holding AM dose), resume as per ortho post op  - GI prophylaxis: not indicated   - Diet: DASH, NPO after midnight   - Activity: Bed rest 
This is a 77 y/o F with PMHx of HTN, HLD, TIA, breast cancer (s/p bilateral mastectomy, in remission) presents with pain in left hip s/p mechanical ground level fall this afternoon. Patient reports she tripped and fell while at home in her bedroom while trying to put on her pants. Reports head trauma but denies LOC. Reports left hip pain, but denies pain elsewhere.     # Left femoral neck fracture sec to  mechanical fall   s/p L hip hemiarthroplasty on 5/26  continue with PT    Eliquis     # Right knee pseudogout  - S/p  R knee arthrocentesis w/ CSI on 5/26, cultures negative     # H/o  TIA   ASA, statin      # Hypertension  BP: 108/54 (06 Jun 2023 08:15) (107/56 - 114/55)  controlled     #Acute cystitis UA / Urine culture and start vantin 100 mg q12h x 7 days     #Acute on chronic anemia secondary to intraoperative blood loss     # Neuropathy     gabapentin    #Overweight BMI 29 patient needs to see dieitian outpatient for further evaluation     #+ Deep venous thrombosis - eliquis     #Hyponatremia no intervention     # Full code    PROGRESS NOTE HANDOFF    Pending:  dispo planning     Family discussion: patient verbalized understanding and agreeable to plan of care     Disposition: Rehab 
This is a 77 y/o F with PMHx of HTN, HLD, TIA, breast cancer (s/p bilateral mastectomy, in remission) presents with pain in left hip s/p mechanical ground level fall this afternoon. Patient reports she tripped and fell while at home in her bedroom while trying to put on her pants. Reports head trauma but denies LOC. Reports left hip pain, but denies pain elsewhere.     # Left femoral neck fracture sec to  mechanical fall   s/p L hip hemiarthroplasty on 5/26  continue with PT   aspirin 325 mg q24h x 6 weeks    # Right knee pseudogout  - S/p  R knee arthrocentesis w/ CSI on 5/26, cultures negative     # H/o  TIA   ASA, statin      # Hypertension  BP: 106/55 (05 Jun 2023 08:23) (97/52 - 132/60)  controlled     #Acute cystitis UA / Urine culture and start vantin 100 mg q12h x 7 days     #Acute on chronic anemia secondary to intraoperative blood loss     # Neuropathy     gabapentin    #Overweight BMI 29 patient needs to see dieitian outpatient for further evaluation     # DVT prophylaxis    # Full code    PROGRESS NOTE HANDOFF    Pending:  dispo planning     Family discussion: patient verbalized understanding and agreeable to plan of care     Disposition: Rehab 
This is a 77 y/o F with PMHx of HTN, HLD, TIA, breast cancer (s/p bilateral mastectomy, in remission) presents with pain in left hip s/p mechanical ground level fall this afternoon. Patient reports she tripped and fell while at home in her bedroom while trying to put on her pants. Reports head trauma but denies LOC. Reports left hip pain, but denies pain elsewhere.     # Left femoral neck fracture sec to  mechanical fall   s/p L hip hemiarthroplasty on 5/26  continue with PT   aspirin 325 mg q24h x 6 weeks    # Right knee pseudogout  - S/p  R knee arthrocentesis w/ CSI on 5/26, cultures negative     # H/o  TIA   ASA, statin      # Hypertension  BP: 160/68 (04 Jun 2023 09:55) (131/60 - 160/68)controlled, last reading elevated , monitor for now     #Acute cystitis UA / Urine culture and start vantin 100 mg q12h x 7 days     #Acute on chronic anemia secondary to intraoperative blood loss   no indication for transfusion   hemoglobin stabilized     # Neuropathy     gabapentin    #Overweight BMI 29 patient needs to see dieitian outpatient for further evaluation     # DVT prophylaxis    # Full code    PROGRESS NOTE HANDOFF    Pending:  dispo planning     Family discussion: patient verbalized understanding and agreeable to plan of care     Disposition: Rehab 
This is a 79 y/o F with PMHx of HTN, HLD, TIA, breast cancer (s/p bilateral mastectomy, in remission) presents with pain in left hip s/p mechanical ground level fall this afternoon. Patient reports she tripped and fell while at home in her bedroom while trying to put on her pants. Reports head trauma but denies LOC. Reports left hip pain, but denies pain elsewhere.     # Left femoral neck fracture sec to  mechanical fall   s/p L hip hemiarthroplasty on 5/26  continue with PT   aspirin 325 mg q24h x 6 weeks    # Right knee pseudogout  - S/p  R knee arthrocentesis w/ CSI on 5/26, cultures negative     # H/o  TIA   ASA, statin      # Hypertension  BP: 113/56 (31 May 2023 08:20) (113/56 - 131/58)  controlled    # Fever-resolved    #Acute on chronic anemia secondary to intraoperative blood loss   no indication for transfusion   hemoglobin stabilized     # Neuropathy   - start gabapentin    # Constipation- resolved    #Overweight BMI 29 patient needs to see dieitian outpatient for further evaluation     # DVT prophylaxis    # Full code  PROGRESS NOTE HANDOFF    Pending:  dispo planning     Family discussion: patient verbalized understanding and agreeable to plan of care     Disposition: Rehab 
77 y/o F with PMHx of HTN, HLD, TIA, breast cancer (s/p bilateral mastectomy, in remission) presents with pain in left hip s/p mechanical fall, Found to have Left acute femoral neck fracture.     #Left acute femoral neck fracture  #Post op arthroplasty (OR on 5/26 PM)  #Anemia post-op  - Sp mechanical fall  - Xray pelvis 5/23: Examination reveals a left femoral neck fracture with superior placement of the femoral shaft. The bones are diffusely demineralized.  - Planned for OR 5/26  - CT H unremarkable  - Pain control   - Activity: WBAT LLE  - Abx: Ancef 2g q8hr x3 doses for a total of 24hrs post-operatively, ordered (received ancef preop with no reaction) -completed   - DVT PPx: Lovenox & SCDs  - Pain control  - Incentive spirometry   - PT    #New fever on 5/26- resolved  - Could be reactive due to the fracture  - No noted phlebitis  - Duplex US negative  - CXR no infiltrates or atelactesis  - RVP negative, Blood Cx NGTD  - Incentive spirometry    #Chronic neuropathic pain  - Started gabapentin on 5/30    #Knee pain- resolved  #Pseudogout  - Knee tapped in OR on 5/26  - Given dexamethasone 4mg intra-articular    #HTN   - on enalapril and metoprolol     #Hx of TIA  - c/w ASA and statin      #Misc  - DVT prophylaxis: Lovenox 40 OD  - GI prophylaxis: protonix  - Diet: DASH  - Activity: IAT
79 y/o F with PMHx of HTN, HLD, TIA, breast cancer (s/p bilateral mastectomy, in remission) presents with pain in left hip s/p mechanical fall, Found to have Left acute femoral neck fracture.     #Left acute femoral neck fracture  - Sp mechanical fall  - Xray pelvis 5/23: Examination reveals a left femoral neck fracture with superior placement of the femoral shaft. The bones are diffusely demineralized.  - Planned for OR 5/24  - CT H unremarkable  - Bed rest  - Pain control   - s/p Left femoral nerve block in the ED  - s/p 1 dose of Lovenox for DVT ppx,    #HTN   - on enalapril and metoprolol     #Hx of TIA  - c/w ASA and statin      #Misc  - DVT prophylaxis: Lovenox (holding AM dose), resume as per ortho  - GI prophylaxis: not indicated   - Diet: DASH, NPO after midnight   - Activity: Bed rest 
This is a 79 y/o F with PMHx of HTN, HLD, TIA, breast cancer (s/p bilateral mastectomy, in remission) presents with pain in left hip s/p mechanical ground level fall this afternoon. Patient reports she tripped and fell while at home in her bedroom while trying to put on her pants. Reports head trauma but denies LOC. Reports left hip pain, but denies pain elsewhere.     # Left femoral neck fracture sec to  mechanical fall  - Xray Hip 2-3 Views, Left (05.23.23 @ 18:50) >Stable position of previously reported left femoral neck fracture.  - s/p L hip hemiarthroplasty on 5/26  - c/w pain meds  - WBAT LLE  -evaluated by physiatry -> 4 A  - PT eval    # Right knee pseudogout  - S/p  R knee arthrocentesis w/ CSI on 5/26    # H/o  TIA  - c/w ASA, statin      # Hypertension  - c/w metoprolol, enalapril    # Fever-resolved  - Xray Chest 1 View AP/PA (05.26.23 @ 06:40) >Cardiomegaly. Otherwise unremarkable study.  - UA: neg  - Rapid RVP Result: NotDetec (05.26.23 @ 13:30)  - blood Culture Results: No growth to date. (05.26.23 @ 07:02)  - [facility] Duplex Lower Ext Vein Scan, Bilat (05.26.23 @ 10:59) >No evidence of deep venous thrombosis in either lower extremity  - Incentive spirometry    # Constipation  - c/w Bisacodyl, senna, start lactulose    # DVT prophylaxis    # Full code    # Pending: auth for 4 A  # Discussed plan of care with patient  # Disposition: probably STR  
This is a 77 y/o F with PMHx of HTN, HLD, TIA, breast cancer (s/p bilateral mastectomy, in remission) presents with pain in left hip s/p mechanical ground level fall this afternoon. Patient reports she tripped and fell while at home in her bedroom while trying to put on her pants. Reports head trauma but denies LOC. Reports left hip pain, but denies pain elsewhere.     # Left femoral neck fracture sec to  mechanical fall  - Xray Hip 2-3 Views, Left (05.23.23 @ 18:50) >Stable position of previously reported left femoral neck fracture.  - s/p L hip hemiarthroplasty on 5/26  - c/w pain meds  - WBAT LLE  - physiatry and PT eval    # Right knee pseudogout  - S/p  R knee arthrocentesis w/ CSI on 5/26    # H/o  TIA  - c/w ASA, statin      # Hypertension  - c/w metoprolol, enalapril    # Fever  - Xray Chest 1 View AP/PA (05.26.23 @ 06:40) >Cardiomegaly. Otherwise unremarkable study.  - UA: neg  - Rapid RVP Result: NotDetec (05.26.23 @ 13:30)  - blood Culture Results: No growth to date. (05.26.23 @ 07:02)  - [facility] Duplex Lower Ext Vein Scan, Bilat (05.26.23 @ 10:59) >No evidence of deep venous thrombosis in either lower extremity  - Incentive spirometry    # DVT prophylaxis    # Full code    # Pending: Pt eval  # Discussed plan of care with patient  # Disposition: probably STR  
This is a 77 y/o F with PMHx of HTN, HLD, TIA, breast cancer (s/p bilateral mastectomy, in remission) presents with pain in left hip s/p mechanical ground level fall this afternoon. Patient reports she tripped and fell while at home in her bedroom while trying to put on her pants. Reports head trauma but denies LOC. Reports left hip pain, but denies pain elsewhere.     # Left femoral neck fracture sec to  mechanical fall  - Xray Hip 2-3 Views, Left (05.23.23 @ 18:50) >Stable position of previously reported left femoral neck fracture.  - planned for L hip hemiarthroplasty today  - c/w pain meds    # H/o  TIA  - c/w ASA, statin      # Hypertension  - c/w metoprolol, enalapril    # Fever  - Xray Chest 1 View AP/PA (05.26.23 @ 06:40) >Cardiomegaly. Otherwise unremarkable study.  - UA: neg  - F/u RVP,  blood cultures  - F/u doppler lower ext  - Incentive spirometry    # DVT prophylaxis    # Full code    # Pending: L hip hemiarthroplasty today, F/u blood cultures, lower ext doppler, RVP  # Discussed plan of care with patient  # Disposition: probably STR

## 2023-06-06 NOTE — PROGRESS NOTE ADULT - PROVIDER SPECIALTY LIST ADULT
Hospitalist
Internal Medicine
Hospitalist
Internal Medicine
Orthopedics
Hospitalist
Internal Medicine
Hospitalist
Internal Medicine
Internal Medicine
Hospitalist

## 2023-06-07 ENCOUNTER — TRANSCRIPTION ENCOUNTER (OUTPATIENT)
Age: 78
End: 2023-06-07

## 2023-06-07 VITALS
TEMPERATURE: 98 F | OXYGEN SATURATION: 99 % | DIASTOLIC BLOOD PRESSURE: 56 MMHG | RESPIRATION RATE: 18 BRPM | HEART RATE: 75 BPM | SYSTOLIC BLOOD PRESSURE: 120 MMHG

## 2023-06-07 LAB
ANION GAP SERPL CALC-SCNC: 12 MMOL/L — SIGNIFICANT CHANGE UP (ref 7–14)
BASOPHILS # BLD AUTO: 0.02 K/UL — SIGNIFICANT CHANGE UP (ref 0–0.2)
BASOPHILS NFR BLD AUTO: 0.2 % — SIGNIFICANT CHANGE UP (ref 0–1)
BUN SERPL-MCNC: 17 MG/DL — SIGNIFICANT CHANGE UP (ref 10–20)
CALCIUM SERPL-MCNC: 8 MG/DL — LOW (ref 8.4–10.5)
CHLORIDE SERPL-SCNC: 100 MMOL/L — SIGNIFICANT CHANGE UP (ref 98–110)
CO2 SERPL-SCNC: 22 MMOL/L — SIGNIFICANT CHANGE UP (ref 17–32)
CREAT SERPL-MCNC: 0.6 MG/DL — LOW (ref 0.7–1.5)
EGFR: 92 ML/MIN/1.73M2 — SIGNIFICANT CHANGE UP
EOSINOPHIL # BLD AUTO: 0.13 K/UL — SIGNIFICANT CHANGE UP (ref 0–0.7)
EOSINOPHIL NFR BLD AUTO: 1.5 % — SIGNIFICANT CHANGE UP (ref 0–8)
GLUCOSE SERPL-MCNC: 128 MG/DL — HIGH (ref 70–99)
HCT VFR BLD CALC: 29.1 % — LOW (ref 37–47)
HGB BLD-MCNC: 9.8 G/DL — LOW (ref 12–16)
IMM GRANULOCYTES NFR BLD AUTO: 0.8 % — HIGH (ref 0.1–0.3)
LYMPHOCYTES # BLD AUTO: 1.83 K/UL — SIGNIFICANT CHANGE UP (ref 1.2–3.4)
LYMPHOCYTES # BLD AUTO: 21 % — SIGNIFICANT CHANGE UP (ref 20.5–51.1)
MAGNESIUM SERPL-MCNC: 2 MG/DL — SIGNIFICANT CHANGE UP (ref 1.8–2.4)
MCHC RBC-ENTMCNC: 30.9 PG — SIGNIFICANT CHANGE UP (ref 27–31)
MCHC RBC-ENTMCNC: 33.7 G/DL — SIGNIFICANT CHANGE UP (ref 32–37)
MCV RBC AUTO: 91.8 FL — SIGNIFICANT CHANGE UP (ref 81–99)
MONOCYTES # BLD AUTO: 0.76 K/UL — HIGH (ref 0.1–0.6)
MONOCYTES NFR BLD AUTO: 8.7 % — SIGNIFICANT CHANGE UP (ref 1.7–9.3)
NEUTROPHILS # BLD AUTO: 5.92 K/UL — SIGNIFICANT CHANGE UP (ref 1.4–6.5)
NEUTROPHILS NFR BLD AUTO: 67.8 % — SIGNIFICANT CHANGE UP (ref 42.2–75.2)
NRBC # BLD: 0 /100 WBCS — SIGNIFICANT CHANGE UP (ref 0–0)
PLATELET # BLD AUTO: 224 K/UL — SIGNIFICANT CHANGE UP (ref 130–400)
PMV BLD: 11 FL — HIGH (ref 7.4–10.4)
POTASSIUM SERPL-MCNC: 4.6 MMOL/L — SIGNIFICANT CHANGE UP (ref 3.5–5)
POTASSIUM SERPL-SCNC: 4.6 MMOL/L — SIGNIFICANT CHANGE UP (ref 3.5–5)
RBC # BLD: 3.17 M/UL — LOW (ref 4.2–5.4)
RBC # FLD: 13.3 % — SIGNIFICANT CHANGE UP (ref 11.5–14.5)
SODIUM SERPL-SCNC: 134 MMOL/L — LOW (ref 135–146)
SURGICAL PATHOLOGY STUDY: SIGNIFICANT CHANGE UP
WBC # BLD: 8.73 K/UL — SIGNIFICANT CHANGE UP (ref 4.8–10.8)
WBC # FLD AUTO: 8.73 K/UL — SIGNIFICANT CHANGE UP (ref 4.8–10.8)

## 2023-06-07 PROCEDURE — 99239 HOSP IP/OBS DSCHRG MGMT >30: CPT

## 2023-06-07 RX ORDER — APIXABAN 2.5 MG/1
2 TABLET, FILM COATED ORAL
Qty: 88 | Refills: 0
Start: 2023-06-07 | End: 2023-06-13

## 2023-06-07 RX ORDER — FERROUS SULFATE 325(65) MG
1 TABLET ORAL
Qty: 0 | Refills: 0 | DISCHARGE
Start: 2023-06-07

## 2023-06-07 RX ORDER — APIXABAN 2.5 MG/1
2 TABLET, FILM COATED ORAL
Qty: 28 | Refills: 0
Start: 2023-06-07 | End: 2023-06-13

## 2023-06-07 RX ORDER — FERROUS SULFATE 325(65) MG
325 TABLET ORAL DAILY
Refills: 0 | Status: DISCONTINUED | OUTPATIENT
Start: 2023-06-07 | End: 2023-06-07

## 2023-06-07 RX ORDER — ACETAMINOPHEN 500 MG
2 TABLET ORAL
Qty: 0 | Refills: 0 | DISCHARGE
Start: 2023-06-07

## 2023-06-07 RX ADMIN — PANTOPRAZOLE SODIUM 40 MILLIGRAM(S): 20 TABLET, DELAYED RELEASE ORAL at 05:39

## 2023-06-07 RX ADMIN — Medication 100 MILLIGRAM(S): at 05:39

## 2023-06-07 RX ADMIN — Medication 10 MILLIGRAM(S): at 05:39

## 2023-06-07 RX ADMIN — Medication 325 MILLIGRAM(S): at 11:19

## 2023-06-07 RX ADMIN — Medication 81 MILLIGRAM(S): at 11:19

## 2023-06-07 RX ADMIN — Medication 25 MILLIGRAM(S): at 05:40

## 2023-06-07 RX ADMIN — ENOXAPARIN SODIUM 80 MILLIGRAM(S): 100 INJECTION SUBCUTANEOUS at 08:22

## 2023-06-07 RX ADMIN — GABAPENTIN 100 MILLIGRAM(S): 400 CAPSULE ORAL at 05:39

## 2023-06-07 NOTE — DISCHARGE NOTE NURSING/CASE MANAGEMENT/SOCIAL WORK - PATIENT PORTAL LINK FT
You can access the FollowMyHealth Patient Portal offered by [facility] by registering at the following website: [email or web address]. By joining [employer]’s FollowMyHealth portal, you will also be able to view your health information using other applications (apps) compatible with our system.

## 2023-06-08 PROCEDURE — 76942 ECHO GUIDE FOR BIOPSY: CPT | Mod: 26

## 2023-06-10 LAB
CULTURE RESULTS: SIGNIFICANT CHANGE UP
SPECIMEN SOURCE: SIGNIFICANT CHANGE UP

## 2023-06-13 DIAGNOSIS — R35.0 FREQUENCY OF MICTURITION: ICD-10-CM

## 2023-06-13 DIAGNOSIS — R73.03 PREDIABETES: ICD-10-CM

## 2023-06-13 DIAGNOSIS — E78.00 PURE HYPERCHOLESTEROLEMIA, UNSPECIFIED: ICD-10-CM

## 2023-06-13 DIAGNOSIS — Z79.82 LONG TERM (CURRENT) USE OF ASPIRIN: ICD-10-CM

## 2023-06-13 DIAGNOSIS — I10 ESSENTIAL (PRIMARY) HYPERTENSION: ICD-10-CM

## 2023-06-13 DIAGNOSIS — Z86.73 PERSONAL HISTORY OF TRANSIENT ISCHEMIC ATTACK (TIA), AND CEREBRAL INFARCTION WITHOUT RESIDUAL DEFICITS: ICD-10-CM

## 2023-06-13 DIAGNOSIS — E66.3 OVERWEIGHT: ICD-10-CM
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
## 2023-06-13 DIAGNOSIS — E87.1 HYPO-OSMOLALITY AND HYPONATREMIA: ICD-10-CM

## 2023-06-13 DIAGNOSIS — S72.002A FRACTURE OF UNSPECIFIED PART OF NECK OF LEFT FEMUR, INITIAL ENCOUNTER FOR CLOSED FRACTURE: ICD-10-CM

## 2023-06-13 DIAGNOSIS — G62.9 POLYNEUROPATHY, UNSPECIFIED: ICD-10-CM

## 2023-06-13 DIAGNOSIS — M11.261 OTHER CHONDROCALCINOSIS, RIGHT KNEE: ICD-10-CM

## 2023-06-13 DIAGNOSIS — Z85.3 PERSONAL HISTORY OF MALIGNANT NEOPLASM OF BREAST: ICD-10-CM
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
## 2023-06-13 DIAGNOSIS — N30.00 ACUTE CYSTITIS WITHOUT HEMATURIA: ICD-10-CM

## 2023-06-13 DIAGNOSIS — Z90.11 ACQUIRED ABSENCE OF RIGHT BREAST AND NIPPLE: ICD-10-CM

## 2023-06-13 DIAGNOSIS — D62 ACUTE POSTHEMORRHAGIC ANEMIA: ICD-10-CM

## 2023-06-13 DIAGNOSIS — I82.462 ACUTE EMBOLISM AND THROMBOSIS OF LEFT CALF MUSCULAR VEIN: ICD-10-CM

## 2023-06-14 DIAGNOSIS — S72.002A FRACTURE OF UNSPECIFIED PART OF NECK OF LEFT FEMUR, INITIAL ENCOUNTER FOR CLOSED FRACTURE: ICD-10-CM

## 2023-06-14 RX ORDER — APIXABAN 2.5 MG/1
1 TABLET, FILM COATED ORAL
Qty: 60 | Refills: 0
Start: 2023-06-14 | End: 2023-07-13

## 2023-06-21 ENCOUNTER — TRANSCRIPTION ENCOUNTER (OUTPATIENT)
Age: 78
End: 2023-06-21

## 2023-07-03 ENCOUNTER — APPOINTMENT (OUTPATIENT)
Dept: INFUSION THERAPY | Facility: CLINIC | Age: 78
End: 2023-07-03

## 2023-07-03 ENCOUNTER — OUTPATIENT (OUTPATIENT)
Dept: OUTPATIENT SERVICES | Facility: HOSPITAL | Age: 78
LOS: 1 days | End: 2023-07-03
Payer: MEDICARE

## 2023-07-03 DIAGNOSIS — C50.919 MALIGNANT NEOPLASM OF UNSPECIFIED SITE OF UNSPECIFIED FEMALE BREAST: ICD-10-CM

## 2023-07-03 DIAGNOSIS — Z98.890 OTHER SPECIFIED POSTPROCEDURAL STATES: Chronic | ICD-10-CM

## 2023-07-03 DIAGNOSIS — Z90.12 ACQUIRED ABSENCE OF LEFT BREAST AND NIPPLE: Chronic | ICD-10-CM

## 2023-07-03 DIAGNOSIS — Z90.11 ACQUIRED ABSENCE OF RIGHT BREAST AND NIPPLE: Chronic | ICD-10-CM

## 2023-07-03 PROCEDURE — 96523 IRRIG DRUG DELIVERY DEVICE: CPT

## 2023-07-04 DIAGNOSIS — C50.919 MALIGNANT NEOPLASM OF UNSPECIFIED SITE OF UNSPECIFIED FEMALE BREAST: ICD-10-CM

## 2023-07-20 ENCOUNTER — APPOINTMENT (OUTPATIENT)
Dept: ORTHOPEDIC SURGERY | Facility: CLINIC | Age: 78
End: 2023-07-20
Payer: MEDICARE

## 2023-07-20 PROCEDURE — 73502 X-RAY EXAM HIP UNI 2-3 VIEWS: CPT

## 2023-07-20 PROCEDURE — 99024 POSTOP FOLLOW-UP VISIT: CPT

## 2023-07-20 NOTE — HISTORY OF PRESENT ILLNESS
[de-identified] :  78-year-old woman returns for interval follow-up status post left hip hemiarthroplasty for fracture on May 26, 2023.  Postoperative the patient's surgery was complicated by the development DVT for which she is on Eliquis.  She is walking with a walker for distances cane for short distances.  She is completed therapy to skilled nursing facility in his now at home.  She is not started outpatient physical therapy.  She denies any fevers or drainage.  Minimal discomfort.  Some mild groin pain with certain motions.

## 2023-07-20 NOTE — ASSESSMENT
[FreeTextEntry1] :   78-year-old woman returns for interval evaluation left hip hemiarthroplasty for fracture.  She seems to be recovering well without issue.  She might benefit from outpatient physical therapy such as to improve her walking endurance.  Physical therapy with us focus on hip strengthening core strengthening gait training balance and generalized conditioning with the focus on teaching her a self-directed exercise program of strengthening and generalized conditioning.  Modalities could be utilized adjunct therapy.  She can continue with Tylenol as needed for pain relief.  I would have her follow up in my office in 3 months for interval check.  I would only repeat radiographs if she is having worsening groin pain.  All questions were answered to her satisfaction and she agrees with the plan.

## 2023-07-20 NOTE — IMAGING
[de-identified] :   Pleasant late middle-aged woman sits reasonably comfortably in my office in no distress.  \par \par Physical examination:\par Left hip:  Surgical incisions have healed in begun remodeled.  She is no pain with internal external rotation of joints of full extension.  Mild discomfort with forced internal rotation at 90° of flexion.  No lymph nodes in inguinal crease.  No tenderness palpation about the inguinal crease her greater trochanteric bursa.\par \par Gait: Nonantalgic.  No Trendelenburg lurch.  \par \par Radiographs:\par Left hip (AP, lateral):  Well-seated cemented bipolar prosthesis.

## 2023-07-31 ENCOUNTER — OUTPATIENT (OUTPATIENT)
Dept: OUTPATIENT SERVICES | Facility: HOSPITAL | Age: 78
LOS: 1 days | End: 2023-07-31
Payer: MEDICARE

## 2023-07-31 ENCOUNTER — APPOINTMENT (OUTPATIENT)
Dept: INFUSION THERAPY | Facility: CLINIC | Age: 78
End: 2023-07-31

## 2023-07-31 DIAGNOSIS — Z90.12 ACQUIRED ABSENCE OF LEFT BREAST AND NIPPLE: Chronic | ICD-10-CM

## 2023-07-31 DIAGNOSIS — Z90.11 ACQUIRED ABSENCE OF RIGHT BREAST AND NIPPLE: Chronic | ICD-10-CM

## 2023-07-31 DIAGNOSIS — Z98.890 OTHER SPECIFIED POSTPROCEDURAL STATES: Chronic | ICD-10-CM

## 2023-07-31 DIAGNOSIS — C50.919 MALIGNANT NEOPLASM OF UNSPECIFIED SITE OF UNSPECIFIED FEMALE BREAST: ICD-10-CM

## 2023-07-31 PROCEDURE — 96523 IRRIG DRUG DELIVERY DEVICE: CPT

## 2023-08-28 ENCOUNTER — APPOINTMENT (OUTPATIENT)
Dept: INFUSION THERAPY | Facility: CLINIC | Age: 78
End: 2023-08-28

## 2023-08-28 ENCOUNTER — OUTPATIENT (OUTPATIENT)
Dept: OUTPATIENT SERVICES | Facility: HOSPITAL | Age: 78
LOS: 1 days | End: 2023-08-28
Payer: MEDICARE

## 2023-08-28 DIAGNOSIS — Z98.890 OTHER SPECIFIED POSTPROCEDURAL STATES: Chronic | ICD-10-CM

## 2023-08-28 DIAGNOSIS — Z90.11 ACQUIRED ABSENCE OF RIGHT BREAST AND NIPPLE: Chronic | ICD-10-CM

## 2023-08-28 DIAGNOSIS — C50.919 MALIGNANT NEOPLASM OF UNSPECIFIED SITE OF UNSPECIFIED FEMALE BREAST: ICD-10-CM

## 2023-08-28 DIAGNOSIS — Z90.12 ACQUIRED ABSENCE OF LEFT BREAST AND NIPPLE: Chronic | ICD-10-CM

## 2023-08-28 PROCEDURE — 96523 IRRIG DRUG DELIVERY DEVICE: CPT

## 2023-09-26 ENCOUNTER — LABORATORY RESULT (OUTPATIENT)
Age: 78
End: 2023-09-26

## 2023-09-26 ENCOUNTER — APPOINTMENT (OUTPATIENT)
Dept: HEMATOLOGY ONCOLOGY | Facility: CLINIC | Age: 78
End: 2023-09-26
Payer: MEDICARE

## 2023-09-26 ENCOUNTER — APPOINTMENT (OUTPATIENT)
Dept: INFUSION THERAPY | Facility: CLINIC | Age: 78
End: 2023-09-26
Payer: MEDICARE

## 2023-09-26 ENCOUNTER — OUTPATIENT (OUTPATIENT)
Dept: OUTPATIENT SERVICES | Facility: HOSPITAL | Age: 78
LOS: 1 days | End: 2023-09-26
Payer: MEDICARE

## 2023-09-26 ENCOUNTER — APPOINTMENT (OUTPATIENT)
Dept: HEMATOLOGY ONCOLOGY | Facility: CLINIC | Age: 78
End: 2023-09-26

## 2023-09-26 VITALS
RESPIRATION RATE: 16 BRPM | HEIGHT: 64 IN | DIASTOLIC BLOOD PRESSURE: 64 MMHG | SYSTOLIC BLOOD PRESSURE: 124 MMHG | BODY MASS INDEX: 28.68 KG/M2 | HEART RATE: 76 BPM | WEIGHT: 168 LBS | TEMPERATURE: 97.9 F

## 2023-09-26 DIAGNOSIS — Z90.12 ACQUIRED ABSENCE OF LEFT BREAST AND NIPPLE: Chronic | ICD-10-CM

## 2023-09-26 DIAGNOSIS — C50.919 MALIGNANT NEOPLASM OF UNSPECIFIED SITE OF UNSPECIFIED FEMALE BREAST: ICD-10-CM

## 2023-09-26 DIAGNOSIS — Z98.890 OTHER SPECIFIED POSTPROCEDURAL STATES: Chronic | ICD-10-CM

## 2023-09-26 DIAGNOSIS — Z90.11 ACQUIRED ABSENCE OF RIGHT BREAST AND NIPPLE: Chronic | ICD-10-CM

## 2023-09-26 LAB
ALBUMIN SERPL ELPH-MCNC: 4.5 G/DL
ALP BLD-CCNC: 92 U/L
ALT SERPL-CCNC: 9 U/L
ANION GAP SERPL CALC-SCNC: 15 MMOL/L
AST SERPL-CCNC: 10 U/L
BILIRUB SERPL-MCNC: 0.4 MG/DL
BUN SERPL-MCNC: 21 MG/DL
CALCIUM SERPL-MCNC: 9.3 MG/DL
CHLORIDE SERPL-SCNC: 104 MMOL/L
CO2 SERPL-SCNC: 23 MMOL/L
CREAT SERPL-MCNC: 0.7 MG/DL
EGFR: 89 ML/MIN/1.73M2
GLUCOSE SERPL-MCNC: 105 MG/DL
HCT VFR BLD CALC: 39.9 %
HGB BLD-MCNC: 13 G/DL
MCHC RBC-ENTMCNC: 27.9 PG
MCHC RBC-ENTMCNC: 32.6 G/DL
MCV RBC AUTO: 85.6 FL
PLATELET # BLD AUTO: 165 K/UL
PMV BLD: 11.9 FL
POTASSIUM SERPL-SCNC: 4.7 MMOL/L
PROT SERPL-MCNC: 7.1 G/DL
RBC # BLD: 4.66 M/UL
RBC # FLD: 14 %
SODIUM SERPL-SCNC: 142 MMOL/L
WBC # FLD AUTO: 7.17 K/UL

## 2023-09-26 PROCEDURE — 99213 OFFICE O/P EST LOW 20 MIN: CPT

## 2023-09-26 PROCEDURE — 85027 COMPLETE CBC AUTOMATED: CPT

## 2023-09-26 PROCEDURE — 36415 COLL VENOUS BLD VENIPUNCTURE: CPT

## 2023-09-26 PROCEDURE — 80053 COMPREHEN METABOLIC PANEL: CPT

## 2023-09-27 DIAGNOSIS — C50.919 MALIGNANT NEOPLASM OF UNSPECIFIED SITE OF UNSPECIFIED FEMALE BREAST: ICD-10-CM

## 2023-10-19 ENCOUNTER — APPOINTMENT (OUTPATIENT)
Dept: VASCULAR SURGERY | Facility: CLINIC | Age: 78
End: 2023-10-19
Payer: MEDICARE

## 2023-10-19 VITALS — BODY MASS INDEX: 28.68 KG/M2 | WEIGHT: 168 LBS | HEIGHT: 64 IN

## 2023-10-19 VITALS — DIASTOLIC BLOOD PRESSURE: 74 MMHG | SYSTOLIC BLOOD PRESSURE: 125 MMHG

## 2023-10-19 DIAGNOSIS — I82.492 ACUTE EMBOLISM AND THROMBOSIS OF OTHER SPECIFIED DEEP VEIN OF LEFT LOWER EXTREMITY: ICD-10-CM

## 2023-10-19 DIAGNOSIS — M79.89 OTHER SPECIFIED SOFT TISSUE DISORDERS: ICD-10-CM

## 2023-10-19 PROCEDURE — 93970 EXTREMITY STUDY: CPT

## 2023-10-19 PROCEDURE — 99203 OFFICE O/P NEW LOW 30 MIN: CPT

## 2023-10-24 ENCOUNTER — LABORATORY RESULT (OUTPATIENT)
Age: 78
End: 2023-10-24

## 2023-10-24 ENCOUNTER — OUTPATIENT (OUTPATIENT)
Dept: OUTPATIENT SERVICES | Facility: HOSPITAL | Age: 78
LOS: 1 days | End: 2023-10-24
Payer: MEDICARE

## 2023-10-24 ENCOUNTER — APPOINTMENT (OUTPATIENT)
Dept: INFUSION THERAPY | Facility: CLINIC | Age: 78
End: 2023-10-24

## 2023-10-24 DIAGNOSIS — Z98.890 OTHER SPECIFIED POSTPROCEDURAL STATES: Chronic | ICD-10-CM

## 2023-10-24 DIAGNOSIS — C50.919 MALIGNANT NEOPLASM OF UNSPECIFIED SITE OF UNSPECIFIED FEMALE BREAST: ICD-10-CM

## 2023-10-24 DIAGNOSIS — Z90.11 ACQUIRED ABSENCE OF RIGHT BREAST AND NIPPLE: Chronic | ICD-10-CM

## 2023-10-24 DIAGNOSIS — Z90.12 ACQUIRED ABSENCE OF LEFT BREAST AND NIPPLE: Chronic | ICD-10-CM

## 2023-10-24 LAB
ALBUMIN SERPL ELPH-MCNC: 3.9 G/DL
ALP BLD-CCNC: 85 U/L
ALT SERPL-CCNC: 10 U/L
ANION GAP SERPL CALC-SCNC: 11 MMOL/L
AST SERPL-CCNC: 12 U/L
BILIRUB SERPL-MCNC: 0.4 MG/DL
BUN SERPL-MCNC: 16 MG/DL
CALCIUM SERPL-MCNC: 8.6 MG/DL
CHLORIDE SERPL-SCNC: 106 MMOL/L
CO2 SERPL-SCNC: 22 MMOL/L
CREAT SERPL-MCNC: 0.7 MG/DL
EGFR: 89 ML/MIN/1.73M2
GLUCOSE SERPL-MCNC: 85 MG/DL
HCT VFR BLD CALC: 38.2 %
HGB BLD-MCNC: 12.5 G/DL
MCHC RBC-ENTMCNC: 28.1 PG
MCHC RBC-ENTMCNC: 32.7 G/DL
MCV RBC AUTO: 85.8 FL
PLATELET # BLD AUTO: 118 K/UL
PMV BLD: 12.1 FL
POTASSIUM SERPL-SCNC: 4.5 MMOL/L
PROT SERPL-MCNC: 6.6 G/DL
RBC # BLD: 4.45 M/UL
RBC # FLD: 14.3 %
SODIUM SERPL-SCNC: 139 MMOL/L
WBC # FLD AUTO: 6.96 K/UL

## 2023-10-24 PROCEDURE — 85027 COMPLETE CBC AUTOMATED: CPT

## 2023-10-24 PROCEDURE — 36415 COLL VENOUS BLD VENIPUNCTURE: CPT

## 2023-10-24 PROCEDURE — 96523 IRRIG DRUG DELIVERY DEVICE: CPT

## 2023-10-24 PROCEDURE — 80053 COMPREHEN METABOLIC PANEL: CPT

## 2023-10-25 DIAGNOSIS — C50.919 MALIGNANT NEOPLASM OF UNSPECIFIED SITE OF UNSPECIFIED FEMALE BREAST: ICD-10-CM

## 2023-10-27 ENCOUNTER — APPOINTMENT (OUTPATIENT)
Dept: ORTHOPEDIC SURGERY | Facility: CLINIC | Age: 78
End: 2023-10-27
Payer: MEDICARE

## 2023-10-27 DIAGNOSIS — I82.492 ACUTE EMBOLISM AND THROMBOSIS OF OTHER SPECIFIED DEEP VEIN OF LEFT LOWER EXTREMITY: ICD-10-CM

## 2023-10-27 PROCEDURE — 99213 OFFICE O/P EST LOW 20 MIN: CPT

## 2023-11-21 ENCOUNTER — APPOINTMENT (OUTPATIENT)
Dept: INFUSION THERAPY | Facility: CLINIC | Age: 78
End: 2023-11-21

## 2023-11-21 ENCOUNTER — OUTPATIENT (OUTPATIENT)
Dept: OUTPATIENT SERVICES | Facility: HOSPITAL | Age: 78
LOS: 1 days | End: 2023-11-21
Payer: MEDICARE

## 2023-11-21 DIAGNOSIS — Z98.890 OTHER SPECIFIED POSTPROCEDURAL STATES: Chronic | ICD-10-CM

## 2023-11-21 DIAGNOSIS — Z90.11 ACQUIRED ABSENCE OF RIGHT BREAST AND NIPPLE: Chronic | ICD-10-CM

## 2023-11-21 DIAGNOSIS — C50.919 MALIGNANT NEOPLASM OF UNSPECIFIED SITE OF UNSPECIFIED FEMALE BREAST: ICD-10-CM

## 2023-11-21 DIAGNOSIS — Z90.12 ACQUIRED ABSENCE OF LEFT BREAST AND NIPPLE: Chronic | ICD-10-CM

## 2023-11-21 PROCEDURE — 96523 IRRIG DRUG DELIVERY DEVICE: CPT

## 2023-11-22 DIAGNOSIS — C50.919 MALIGNANT NEOPLASM OF UNSPECIFIED SITE OF UNSPECIFIED FEMALE BREAST: ICD-10-CM

## 2023-12-19 ENCOUNTER — APPOINTMENT (OUTPATIENT)
Dept: INFUSION THERAPY | Facility: CLINIC | Age: 78
End: 2023-12-19

## 2023-12-19 ENCOUNTER — OUTPATIENT (OUTPATIENT)
Dept: OUTPATIENT SERVICES | Facility: HOSPITAL | Age: 78
LOS: 1 days | End: 2023-12-19
Payer: MEDICARE

## 2023-12-19 VITALS — SYSTOLIC BLOOD PRESSURE: 164 MMHG | HEART RATE: 62 BPM | TEMPERATURE: 98 F | DIASTOLIC BLOOD PRESSURE: 72 MMHG

## 2023-12-19 DIAGNOSIS — Z98.890 OTHER SPECIFIED POSTPROCEDURAL STATES: Chronic | ICD-10-CM

## 2023-12-19 DIAGNOSIS — C50.919 MALIGNANT NEOPLASM OF UNSPECIFIED SITE OF UNSPECIFIED FEMALE BREAST: ICD-10-CM

## 2023-12-19 DIAGNOSIS — Z90.12 ACQUIRED ABSENCE OF LEFT BREAST AND NIPPLE: Chronic | ICD-10-CM

## 2023-12-19 DIAGNOSIS — Z90.11 ACQUIRED ABSENCE OF RIGHT BREAST AND NIPPLE: Chronic | ICD-10-CM

## 2023-12-19 PROCEDURE — 96523 IRRIG DRUG DELIVERY DEVICE: CPT

## 2023-12-20 DIAGNOSIS — C50.919 MALIGNANT NEOPLASM OF UNSPECIFIED SITE OF UNSPECIFIED FEMALE BREAST: ICD-10-CM

## 2024-01-08 ENCOUNTER — RESULT REVIEW (OUTPATIENT)
Age: 79
End: 2024-01-08

## 2024-01-08 ENCOUNTER — OUTPATIENT (OUTPATIENT)
Dept: OUTPATIENT SERVICES | Facility: HOSPITAL | Age: 79
LOS: 1 days | End: 2024-01-08
Payer: MEDICARE

## 2024-01-08 DIAGNOSIS — Z90.12 ACQUIRED ABSENCE OF LEFT BREAST AND NIPPLE: Chronic | ICD-10-CM

## 2024-01-08 DIAGNOSIS — Z13.820 ENCOUNTER FOR SCREENING FOR OSTEOPOROSIS: ICD-10-CM

## 2024-01-08 DIAGNOSIS — Z98.890 OTHER SPECIFIED POSTPROCEDURAL STATES: Chronic | ICD-10-CM

## 2024-01-08 DIAGNOSIS — Z00.8 ENCOUNTER FOR OTHER GENERAL EXAMINATION: ICD-10-CM

## 2024-01-08 DIAGNOSIS — Z90.11 ACQUIRED ABSENCE OF RIGHT BREAST AND NIPPLE: Chronic | ICD-10-CM

## 2024-01-08 PROCEDURE — 77080 DXA BONE DENSITY AXIAL: CPT

## 2024-01-08 PROCEDURE — 77080 DXA BONE DENSITY AXIAL: CPT | Mod: 26

## 2024-01-09 DIAGNOSIS — Z13.820 ENCOUNTER FOR SCREENING FOR OSTEOPOROSIS: ICD-10-CM

## 2024-01-15 ENCOUNTER — OUTPATIENT (OUTPATIENT)
Dept: OUTPATIENT SERVICES | Facility: HOSPITAL | Age: 79
LOS: 1 days | End: 2024-01-15
Payer: MEDICARE

## 2024-01-15 ENCOUNTER — APPOINTMENT (OUTPATIENT)
Dept: INFUSION THERAPY | Facility: CLINIC | Age: 79
End: 2024-01-15

## 2024-01-15 VITALS — TEMPERATURE: 97 F | SYSTOLIC BLOOD PRESSURE: 150 MMHG | HEART RATE: 71 BPM | DIASTOLIC BLOOD PRESSURE: 67 MMHG

## 2024-01-15 DIAGNOSIS — C50.919 MALIGNANT NEOPLASM OF UNSPECIFIED SITE OF UNSPECIFIED FEMALE BREAST: ICD-10-CM

## 2024-01-15 DIAGNOSIS — Z98.890 OTHER SPECIFIED POSTPROCEDURAL STATES: Chronic | ICD-10-CM

## 2024-01-15 DIAGNOSIS — Z90.11 ACQUIRED ABSENCE OF RIGHT BREAST AND NIPPLE: Chronic | ICD-10-CM

## 2024-01-15 DIAGNOSIS — Z90.12 ACQUIRED ABSENCE OF LEFT BREAST AND NIPPLE: Chronic | ICD-10-CM

## 2024-01-15 PROCEDURE — 96523 IRRIG DRUG DELIVERY DEVICE: CPT

## 2024-01-16 DIAGNOSIS — C50.919 MALIGNANT NEOPLASM OF UNSPECIFIED SITE OF UNSPECIFIED FEMALE BREAST: ICD-10-CM

## 2024-02-06 ENCOUNTER — APPOINTMENT (OUTPATIENT)
Dept: PULMONOLOGY | Facility: CLINIC | Age: 79
End: 2024-02-06
Payer: MEDICARE

## 2024-02-06 VITALS
HEART RATE: 74 BPM | BODY MASS INDEX: 27.49 KG/M2 | RESPIRATION RATE: 14 BRPM | SYSTOLIC BLOOD PRESSURE: 142 MMHG | DIASTOLIC BLOOD PRESSURE: 82 MMHG | OXYGEN SATURATION: 97 % | WEIGHT: 161 LBS | HEIGHT: 64 IN

## 2024-02-06 DIAGNOSIS — R00.2 PALPITATIONS: ICD-10-CM

## 2024-02-06 DIAGNOSIS — R91.8 OTHER NONSPECIFIC ABNORMAL FINDING OF LUNG FIELD: ICD-10-CM

## 2024-02-06 PROCEDURE — 99203 OFFICE O/P NEW LOW 30 MIN: CPT

## 2024-02-06 RX ORDER — ONDANSETRON 8 MG/1
8 TABLET ORAL EVERY 8 HOURS
Qty: 30 | Refills: 3 | Status: COMPLETED | COMMUNITY
Start: 2021-05-11 | End: 2024-02-06

## 2024-02-06 RX ORDER — PSYLLIUM HUSK 0.4 G
CAPSULE ORAL
Refills: 0 | Status: COMPLETED | COMMUNITY
End: 2024-02-06

## 2024-02-06 RX ORDER — PROCHLORPERAZINE MALEATE 10 MG/1
10 TABLET ORAL EVERY 6 HOURS
Qty: 30 | Refills: 3 | Status: COMPLETED | COMMUNITY
Start: 2021-05-11 | End: 2024-02-06

## 2024-02-06 NOTE — REASON FOR VISIT
[Initial] : an initial visit [Sleep Evaluation] : sleep evaluation [[id number]] : The patient presents for sleep evaluation after having a cardiac workup that demonstrated nighttime palpitations.  The patient states that she had a fluttering in her chest and then went to the cardiologist.  He did a Holter monitor that did not see atrial fibrillation rather than a significant amount of palpitations.  She told me there were thousands of them.  The patient goes to bed around 9 PM.  She gets out of bed around 8:00 in the morning.  She states that she likes to read at night but does admit that her sleep is restless.  She goes to the bathroom 2 times per night.  Her  no longer sleeps in the bedroom but states that she does snore but he does not think it is all that bad.  The patient was also found to have a lung mass in 2021.  She had a biopsy that was reported as negative.  She states she has not had follow-up x-ray testing of this area.

## 2024-02-06 NOTE — ASSESSMENT
[FreeTextEntry1] : Assessment:  There is a high clinical suspicion for JOHN, the patient has classic signs of obstructive sleep apnea. Obesity  Plan: I will order home sleep testing and I will see the patient in F/U to arrange for therapies as needed. Weight loss was discussed with the patient.  The patient was counseled against driving in a sleepy state.  The patient is followed by oncology for a lung mass that was biopsied and was negative. She is going to see him in the next few weeks for routine follow-up.

## 2024-02-13 ENCOUNTER — APPOINTMENT (OUTPATIENT)
Dept: HEMATOLOGY ONCOLOGY | Facility: CLINIC | Age: 79
End: 2024-02-13

## 2024-02-13 ENCOUNTER — APPOINTMENT (OUTPATIENT)
Dept: INFUSION THERAPY | Facility: CLINIC | Age: 79
End: 2024-02-13

## 2024-02-20 ENCOUNTER — APPOINTMENT (OUTPATIENT)
Dept: SLEEP CENTER | Facility: HOSPITAL | Age: 79
End: 2024-02-20
Payer: MEDICARE

## 2024-02-20 ENCOUNTER — OUTPATIENT (OUTPATIENT)
Dept: OUTPATIENT SERVICES | Facility: HOSPITAL | Age: 79
LOS: 1 days | Discharge: HOME | End: 2024-02-20
Payer: MEDICARE

## 2024-02-20 DIAGNOSIS — Z90.12 ACQUIRED ABSENCE OF LEFT BREAST AND NIPPLE: Chronic | ICD-10-CM

## 2024-02-20 DIAGNOSIS — G47.33 OBSTRUCTIVE SLEEP APNEA (ADULT) (PEDIATRIC): ICD-10-CM

## 2024-02-20 DIAGNOSIS — Z98.890 OTHER SPECIFIED POSTPROCEDURAL STATES: Chronic | ICD-10-CM

## 2024-02-20 DIAGNOSIS — Z90.11 ACQUIRED ABSENCE OF RIGHT BREAST AND NIPPLE: Chronic | ICD-10-CM

## 2024-02-20 PROCEDURE — 95800 SLP STDY UNATTENDED: CPT | Mod: 26

## 2024-02-20 PROCEDURE — 95800 SLP STDY UNATTENDED: CPT

## 2024-02-22 ENCOUNTER — OUTPATIENT (OUTPATIENT)
Dept: OUTPATIENT SERVICES | Facility: HOSPITAL | Age: 79
LOS: 1 days | End: 2024-02-22
Payer: MEDICARE

## 2024-02-22 ENCOUNTER — APPOINTMENT (OUTPATIENT)
Dept: INFUSION THERAPY | Facility: CLINIC | Age: 79
End: 2024-02-22
Payer: MEDICARE

## 2024-02-22 ENCOUNTER — APPOINTMENT (OUTPATIENT)
Dept: HEMATOLOGY ONCOLOGY | Facility: CLINIC | Age: 79
End: 2024-02-22
Payer: MEDICARE

## 2024-02-22 VITALS
RESPIRATION RATE: 16 BRPM | TEMPERATURE: 98.7 F | BODY MASS INDEX: 28.34 KG/M2 | HEART RATE: 80 BPM | WEIGHT: 166 LBS | DIASTOLIC BLOOD PRESSURE: 79 MMHG | HEIGHT: 64 IN | SYSTOLIC BLOOD PRESSURE: 141 MMHG

## 2024-02-22 DIAGNOSIS — Z98.890 OTHER SPECIFIED POSTPROCEDURAL STATES: Chronic | ICD-10-CM

## 2024-02-22 DIAGNOSIS — C50.919 MALIGNANT NEOPLASM OF UNSPECIFIED SITE OF UNSPECIFIED FEMALE BREAST: ICD-10-CM

## 2024-02-22 DIAGNOSIS — G47.33 OBSTRUCTIVE SLEEP APNEA (ADULT) (PEDIATRIC): ICD-10-CM

## 2024-02-22 LAB
ALBUMIN SERPL ELPH-MCNC: 4.2 G/DL
ALP BLD-CCNC: 71 U/L
ALT SERPL-CCNC: 11 U/L
ANION GAP SERPL CALC-SCNC: 14 MMOL/L
AST SERPL-CCNC: 12 U/L
BASOPHILS # BLD AUTO: 0.03 K/UL
BASOPHILS NFR BLD AUTO: 0.4 %
BILIRUB SERPL-MCNC: 0.5 MG/DL
BUN SERPL-MCNC: 20 MG/DL
CALCIUM SERPL-MCNC: 9.5 MG/DL
CHLORIDE SERPL-SCNC: 102 MMOL/L
CO2 SERPL-SCNC: 22 MMOL/L
CREAT SERPL-MCNC: 0.8 MG/DL
EGFR: 75 ML/MIN/1.73M2
EOSINOPHIL # BLD AUTO: 0.1 K/UL
EOSINOPHIL NFR BLD AUTO: 1.5 %
GLUCOSE SERPL-MCNC: 110 MG/DL
HCT VFR BLD CALC: 40.3 %
HGB BLD-MCNC: 13.7 G/DL
IMM GRANULOCYTES NFR BLD AUTO: 0.1 %
LYMPHOCYTES # BLD AUTO: 2.42 K/UL
LYMPHOCYTES NFR BLD AUTO: 35.5 %
MAN DIFF?: NORMAL
MCHC RBC-ENTMCNC: 30.4 PG
MCHC RBC-ENTMCNC: 34 G/DL
MCV RBC AUTO: 89.6 FL
MONOCYTES # BLD AUTO: 0.57 K/UL
MONOCYTES NFR BLD AUTO: 8.4 %
NEUTROPHILS # BLD AUTO: 3.68 K/UL
NEUTROPHILS NFR BLD AUTO: 54.1 %
PLATELET # BLD AUTO: 136 K/UL
PMV BLD AUTO: 0 /100 WBCS
POTASSIUM SERPL-SCNC: 4.4 MMOL/L
PROT SERPL-MCNC: 6.8 G/DL
RBC # BLD: 4.5 M/UL
RBC # FLD: 13.4 %
SODIUM SERPL-SCNC: 138 MMOL/L
WBC # FLD AUTO: 6.81 K/UL

## 2024-02-22 PROCEDURE — 99213 OFFICE O/P EST LOW 20 MIN: CPT

## 2024-02-22 PROCEDURE — 80053 COMPREHEN METABOLIC PANEL: CPT

## 2024-02-22 PROCEDURE — 36415 COLL VENOUS BLD VENIPUNCTURE: CPT

## 2024-02-22 PROCEDURE — 85027 COMPLETE CBC AUTOMATED: CPT

## 2024-02-22 NOTE — HISTORY OF PRESENT ILLNESS
[Disease: _____________________] : Disease: [unfilled] [AJCC Stage: ____] : AJCC Stage: [unfilled] [de-identified] : The patient is coming for a follow up for her history of breast cancers.  The first one was almost 31 years ago (the right side) and the second one was more recently in 2021 (S/P mastectomy followed by adjuvant chemotherapy). Both tumors were ER/ID/HER2 (triple) negative.  At present, she has no new particular complaints.  She is being followed by her pulmonary specialist for sleep apnea. No new medications. Recent bone density test has shown osteopenia. She was referred to a physician for that (name not remembered). [de-identified] : Triple negative [de-identified] : Invasive ductal carcinoma

## 2024-02-22 NOTE — REVIEW OF SYSTEMS
[Insomnia] : insomnia [Negative] : Heme/Lymph [de-identified] : Gets up a few times to go to the bathroom.

## 2024-02-22 NOTE — ASSESSMENT
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 The situation was discussed with the patient. Will obtain CBC and CMP and flush the Port-A-Cath with heparin. Further recommendations, as needed, after the above test results are available. If all within acceptable limits, she will be seen again in 6 months for follow up, or sooner if new events related to her cancer occur.  All questions answrered.

## 2024-02-22 NOTE — PHYSICAL EXAM
[Fully active, able to carry on all pre-disease performance without restriction] : Status 0 - Fully active, able to carry on all pre-disease performance without restriction [Normal] : affect appropriate [de-identified] : S/P bilateral mastectomy, no evidence of local recurrence. [de-identified] : Some arthritic changes

## 2024-02-23 DIAGNOSIS — C50.919 MALIGNANT NEOPLASM OF UNSPECIFIED SITE OF UNSPECIFIED FEMALE BREAST: ICD-10-CM

## 2024-03-20 ENCOUNTER — APPOINTMENT (OUTPATIENT)
Dept: PULMONOLOGY | Facility: CLINIC | Age: 79
End: 2024-03-20
Payer: MEDICARE

## 2024-03-20 DIAGNOSIS — G47.33 OBSTRUCTIVE SLEEP APNEA (ADULT) (PEDIATRIC): ICD-10-CM

## 2024-03-20 PROCEDURE — 99442: CPT | Mod: 93

## 2024-03-21 ENCOUNTER — APPOINTMENT (OUTPATIENT)
Dept: INFUSION THERAPY | Facility: CLINIC | Age: 79
End: 2024-03-21

## 2024-03-21 ENCOUNTER — OUTPATIENT (OUTPATIENT)
Dept: OUTPATIENT SERVICES | Facility: HOSPITAL | Age: 79
LOS: 1 days | End: 2024-03-21
Payer: MEDICARE

## 2024-03-21 VITALS — HEART RATE: 75 BPM | TEMPERATURE: 99 F | DIASTOLIC BLOOD PRESSURE: 74 MMHG | SYSTOLIC BLOOD PRESSURE: 167 MMHG

## 2024-03-21 DIAGNOSIS — Z90.11 ACQUIRED ABSENCE OF RIGHT BREAST AND NIPPLE: Chronic | ICD-10-CM

## 2024-03-21 DIAGNOSIS — Z98.890 OTHER SPECIFIED POSTPROCEDURAL STATES: Chronic | ICD-10-CM

## 2024-03-21 DIAGNOSIS — R91.8 OTHER NONSPECIFIC ABNORMAL FINDING OF LUNG FIELD: ICD-10-CM

## 2024-03-21 DIAGNOSIS — Z90.12 ACQUIRED ABSENCE OF LEFT BREAST AND NIPPLE: Chronic | ICD-10-CM

## 2024-03-21 PROCEDURE — 96523 IRRIG DRUG DELIVERY DEVICE: CPT

## 2024-03-22 DIAGNOSIS — R91.8 OTHER NONSPECIFIC ABNORMAL FINDING OF LUNG FIELD: ICD-10-CM

## 2024-04-14 NOTE — PROCEDURAL SAFETY CHECKLIST WITH OR WITHOUT SEDATION - NSPREPROCFT_GEN_ALL_CORE
Nerve block My signature below certifies that the above stated patient is homebound and upon completion of the Face-To-Face encounter, has the need for intermittent skilled nursing, physical therapy and/or speech or occupational therapy services in their home for their current diagnosis as outlined in their initial plan of care. These services will continue to be monitored by myself or another physician.

## 2024-04-18 NOTE — PHYSICAL EXAM
Yes [Fully active, able to carry on all pre-disease performance without restriction] : Status 0 - Fully active, able to carry on all pre-disease performance without restriction [Obese] : obese [Normal] : affect appropriate [de-identified] : S/P bilateral mastectomy, no evidence of local recurrence. [de-identified] : Arthritic changes [de-identified] : Scattered seborrheic keratotic lesions

## 2024-04-25 ENCOUNTER — OUTPATIENT (OUTPATIENT)
Dept: OUTPATIENT SERVICES | Facility: HOSPITAL | Age: 79
LOS: 1 days | End: 2024-04-25
Payer: MEDICARE

## 2024-04-25 ENCOUNTER — APPOINTMENT (OUTPATIENT)
Age: 79
End: 2024-04-25

## 2024-04-25 VITALS — SYSTOLIC BLOOD PRESSURE: 168 MMHG | DIASTOLIC BLOOD PRESSURE: 60 MMHG | HEART RATE: 77 BPM | TEMPERATURE: 97 F

## 2024-04-25 DIAGNOSIS — Z90.11 ACQUIRED ABSENCE OF RIGHT BREAST AND NIPPLE: Chronic | ICD-10-CM

## 2024-04-25 DIAGNOSIS — Z98.890 OTHER SPECIFIED POSTPROCEDURAL STATES: Chronic | ICD-10-CM

## 2024-04-25 DIAGNOSIS — Z90.12 ACQUIRED ABSENCE OF LEFT BREAST AND NIPPLE: Chronic | ICD-10-CM

## 2024-04-25 DIAGNOSIS — R91.8 OTHER NONSPECIFIC ABNORMAL FINDING OF LUNG FIELD: ICD-10-CM

## 2024-04-25 PROCEDURE — 96523 IRRIG DRUG DELIVERY DEVICE: CPT

## 2024-04-26 DIAGNOSIS — R91.8 OTHER NONSPECIFIC ABNORMAL FINDING OF LUNG FIELD: ICD-10-CM

## 2024-05-02 ENCOUNTER — APPOINTMENT (OUTPATIENT)
Dept: ORTHOPEDIC SURGERY | Facility: CLINIC | Age: 79
End: 2024-05-02
Payer: MEDICARE

## 2024-05-02 DIAGNOSIS — Z96.649 PRESENCE OF UNSPECIFIED ARTIFICIAL HIP JOINT: ICD-10-CM

## 2024-05-02 DIAGNOSIS — S72.002D FRACTURE OF UNSPECIFIED PART OF NECK OF LEFT FEMUR, SUBSEQUENT ENCOUNTER FOR CLOSED FRACTURE WITH ROUTINE HEALING: ICD-10-CM

## 2024-05-02 PROCEDURE — 99213 OFFICE O/P EST LOW 20 MIN: CPT

## 2024-05-02 PROCEDURE — 73502 X-RAY EXAM HIP UNI 2-3 VIEWS: CPT

## 2024-05-02 NOTE — ASSESSMENT
[FreeTextEntry1] : 79-year-old woman 11-month status post left hip hemiarthroplasty functioning well.  DEXA study demonstrates no evidence osteoporosis but osteopenia.  Recommend continue use of calcium and vitamin D.  Patient can continue with activities as tolerated.  If she develops worsening symptoms groin pain or deterioration in function she is advised to return to the office sooner.  Otherwise we will see her back for interval check in 1 to 2 years.  At that time we can arrange for repeat DEXA scan to monitor her bone density.

## 2024-05-02 NOTE — IMAGING
[de-identified] : Pleasant middle-aged woman walks into my office looking less than stated age.  She walks in with clear pes planus and pronated foot.  She does not appear to have any pain with her gait and abnormal gait is not driven by her hip.  Physical examination: Left hip: No tenderness palpation about the inguinal crease.  No tenderness of the trochanteric bursa.  No pain with internal/external rotation of joint at full extension 90 degrees of flexion.  Unable to assess a good Trendelenburg lurch secondary to lower extremity dysfunction.  [place] scan results reviewed with patient.  These demonstrate osteopenia.  No evidence of osteoporosis.

## 2024-05-02 NOTE — HISTORY OF PRESENT ILLNESS
[de-identified] : 79-year-old woman returns for interval follow-up status post left hip hemiarthroplasty for fracture June 2023.  Patient is returned to her baseline level activity.  She utilizes a cane occasionally for balance.  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  She is not particular bothered by her flatfoot or the need for a cane.  She does not require any pain medication for her hip.  She notes that the hip is now functioning at the same level as it was prior to her fracture.  She is very happy with the result.  She denies any fevers or drainage.

## 2024-05-23 ENCOUNTER — OUTPATIENT (OUTPATIENT)
Dept: OUTPATIENT SERVICES | Facility: HOSPITAL | Age: 79
LOS: 1 days | End: 2024-05-23
Payer: MEDICARE

## 2024-05-23 ENCOUNTER — APPOINTMENT (OUTPATIENT)
Age: 79
End: 2024-05-23

## 2024-05-23 DIAGNOSIS — R91.8 OTHER NONSPECIFIC ABNORMAL FINDING OF LUNG FIELD: ICD-10-CM

## 2024-05-23 DIAGNOSIS — Z98.890 OTHER SPECIFIED POSTPROCEDURAL STATES: Chronic | ICD-10-CM

## 2024-05-23 DIAGNOSIS — Z90.11 ACQUIRED ABSENCE OF RIGHT BREAST AND NIPPLE: Chronic | ICD-10-CM

## 2024-05-23 DIAGNOSIS — Z90.12 ACQUIRED ABSENCE OF LEFT BREAST AND NIPPLE: Chronic | ICD-10-CM

## 2024-05-23 PROCEDURE — 96523 IRRIG DRUG DELIVERY DEVICE: CPT

## 2024-06-20 ENCOUNTER — OUTPATIENT (OUTPATIENT)
Dept: OUTPATIENT SERVICES | Facility: HOSPITAL | Age: 79
LOS: 1 days | End: 2024-06-20
Payer: MEDICARE

## 2024-06-20 ENCOUNTER — APPOINTMENT (OUTPATIENT)
Age: 79
End: 2024-06-20

## 2024-06-20 DIAGNOSIS — Z98.890 OTHER SPECIFIED POSTPROCEDURAL STATES: Chronic | ICD-10-CM

## 2024-06-20 DIAGNOSIS — R91.8 OTHER NONSPECIFIC ABNORMAL FINDING OF LUNG FIELD: ICD-10-CM

## 2024-06-20 DIAGNOSIS — Z90.11 ACQUIRED ABSENCE OF RIGHT BREAST AND NIPPLE: Chronic | ICD-10-CM

## 2024-06-20 DIAGNOSIS — Z90.12 ACQUIRED ABSENCE OF LEFT BREAST AND NIPPLE: Chronic | ICD-10-CM

## 2024-06-20 PROCEDURE — 96523 IRRIG DRUG DELIVERY DEVICE: CPT

## 2024-06-24 NOTE — PATIENT PROFILE ADULT - NSPROGENBLOODRESTRICT_GEN_A_NUR
We are committed to providing you with the best care possible.   In order to help us achieve these goals please remember to bring all medications, herbal products, and over the counter supplements with you to each visit.     If your provider has ordered testing for you, please be sure to follow up with our office if you have not received results within 7 days after the testing took place.     *If you receive a survey after visiting one of our offices, please take time to share your experience concerning your physician office visit. These surveys are confidential and no health information about you is shared.  We are eager to improve for you and we are counting on your feedback to help make that happen. We are committed to providing you with the best care possible.   In order to help us achieve these goals please remember to bring all medications, herbal products, and over the counter supplements with you to each visit.     If your provider has ordered testing for you, please be sure to follow up with our office if you have not received results within 7 days after the testing took place.     *If you receive a survey after visiting one of our offices, please take time to share your experience concerning your physician office visit. These surveys are confidential and no health information about you is shared.  We are eager to improve for you and we are counting on your feedback to help make that happen.           Wt Readings from Last 3 Encounters:   06/24/24 63.5 kg (140 lb)   06/20/23 76.7 kg (169 lb)   06/15/22 78.9 kg (174 lb)            As you get older the ovaries really don't \"fall out\". They do get smaller in size but they are still present in the body and unfortunately, sometimes they can develop ovarian cancer. Even though we do a pelvic exam where we try to feel the ovaries and the uterus, the ovaries are very small after menopause and can be easily missed. Therefore, even if the doctor told you that they  none

## 2024-07-25 ENCOUNTER — APPOINTMENT (OUTPATIENT)
Age: 79
End: 2024-07-25

## 2024-07-25 ENCOUNTER — OUTPATIENT (OUTPATIENT)
Dept: OUTPATIENT SERVICES | Facility: HOSPITAL | Age: 79
LOS: 1 days | End: 2024-07-25
Payer: MEDICARE

## 2024-07-25 DIAGNOSIS — Z98.890 OTHER SPECIFIED POSTPROCEDURAL STATES: Chronic | ICD-10-CM

## 2024-07-25 DIAGNOSIS — Z90.11 ACQUIRED ABSENCE OF RIGHT BREAST AND NIPPLE: Chronic | ICD-10-CM

## 2024-07-25 DIAGNOSIS — R91.8 OTHER NONSPECIFIC ABNORMAL FINDING OF LUNG FIELD: ICD-10-CM

## 2024-07-25 DIAGNOSIS — Z90.12 ACQUIRED ABSENCE OF LEFT BREAST AND NIPPLE: Chronic | ICD-10-CM

## 2024-07-25 PROCEDURE — 96523 IRRIG DRUG DELIVERY DEVICE: CPT

## 2024-08-15 NOTE — PROGRESS NOTE ADULT - SUBJECTIVE AND OBJECTIVE BOX
PROCEDURE:  Loop Electrosurgical Excision Procedure    HISTORY OF PRESENT ILLNESS:  Maribell Crespo is a 29 year old   who presents to clinic for LEEP 2/2 KENN 2 on colpo. Pt denies BOIDs and pelvic pain. TNo other GYN issues at this time.     Past Medical History:   Diagnosis Date    STD (sexually transmitted disease)     trich      Past Surgical History:   Procedure Laterality Date    Buttock & thigh lift 4 hrs  2024    with lipo    Gastric bypass Bilateral 2023    Hb treatment of arm fracture Left     Pelvis/hip joint surgery unlisted Bilateral     3 hip surgeries      ALLERGIES:   Allergen Reactions    Adhesive   (Environmental) RASH      Social History     Tobacco Use    Smoking status: Never     Passive exposure: Never    Smokeless tobacco: Never    Tobacco comments:     NEVER   Vaping Use    Vaping status: never used   Substance Use Topics    Alcohol use: Yes     Comment: pt stated social only.    Drug use: Never     Comment: NEVER      Family History   Problem Relation Age of Onset    Patient is unaware of any medical problems Mother     Patient is unaware of any medical problems Father     Cancer, Esophageal Maternal Grandmother       OB History    Para Term  AB Living   1 1 1     1   SAB IAB Ectopic Molar Multiple Live Births             1      # Outcome Date GA Lbr Wil/2nd Weight Sex Type Anes PTL Lv   1 Term      Vag-Spont   MASOOD      REVIEW of SYSTEMS:  Review of Systems   Constitutional: Negative for chills and fever.   Eyes: Negative.    Respiratory: Negative for cough and shortness of breath.    Cardiovascular: Negative for chest pain and palpitations.   Gastrointestinal: Negative for abdominal distention, abdominal pain, constipation, diarrhea, nausea and vomiting.   Endocrine: Negative.    Genitourinary: Negative for dyspareunia, dysuria, pelvic pain and vaginal discharge.   Skin: Negative.    Neurological: Negative for dizziness and weakness.      PHYSICAL EXAM:  Visit  Vitals  /76 (BP Location: LUE - Left upper extremity, Patient Position: Sitting, Cuff Size: Small Adult)   Pulse 74   Ht 5' 4\" (1.626 m)   Wt 80 kg (176 lb 7.7 oz)   LMP 07/21/2024 (Exact Date)   SpO2 98%   BMI 30.29 kg/m²    GENERAL APPEARANCE:  The patient is a pleasant, normal appearing female with normal affect and in no distress.  NECK:  Supple. trachea midline.  LUNGS:  non labored respirations  HEART: Normal perfusion  EXTREMITIES:  Warm and well perfused.    GENITOURINARY:    Vulva:  Inspection of her external genitalia reveals normal mons pubis, labia minora and labia majora.        Vagina:  Speculum exam reveals pink and moist vaginal mucosa.  Cervix:  Cervix is normal in appearance with no lesions.  There is no cervical motion tenderness.  Perineum:  Perineum appears normal.    LAB:   Orders Placed This Encounter    POCT Urine pregnancy, in-office    Surgical Pathology      ED Diagnosis   1. Dysplasia of cervix, high grade KENN 2  POCT Urine pregnancy, in-office    Surgical Pathology         ASSESSMENT:  Dysplasia of cervix, high grade KENN 2  (primary encounter diagnosis)     Procedure Overview:  We discussed issues related to abnormal Pap smears/HPV including the etiology, diagnosis, and management options. Per American Society for Colposcopy and Cervical Pathology guidelines, the recommendation is for LEEP procedure. I reviewed the indications for LEEP and the risks of the procedure. I reviewed pain management during the procedure and discussed rare complications including vasovagal reactions, heavy bleeding that occurs in approximately 10% of patients requiring an urgent visit, infections of the cervical bed, scarring of the endocervical canal leading to cervical stenosis, and premature opening of the cervix during pregnancy or cervical incompetence.  The patient has had the opportunity to review written information regarding this procedure. After hearing all of her options, she elected to undergo  a LEEP procedure in the office and informed consent was obtained.    Procedure Timeout  Physician verified correct patient: Yes  Physician verified correct procedure: Yes  Physician verified completed informed consent reviewed and accurate: Yes  Physician verified pertinent /relevant medical record detail reviewed: Yes   Physician verified site and procedure with either patient or family if patient cannot provide confirmation: Yes  Physician marking of site if appropriate: NA  Physician verified immediate availability of all necessary medication: Yes  Physician verified immediate availability of necessary equipment: Yes  Physician Verified sterility or high level disinfection of instruments/equipment prior to procedure: Yes     Procedure  Under sterile conditions and in the procedure room, the patient was placed in dorsal lithotomy position. A bivalve speculum was placed, the cervix was coated with Lugol's and viewed with a colposcope. 1% lidocaine with epinephrine was injected into the cervical stroma. A total of 10 ml was used. The 20 x 10 mm loop electrode was then used to excise the transformation zone down to a depth of 5 mm. Next, a top hat was performed. The base of the LEEP was cauterized with the ball electrode. Monsel's solution was applied to the cervix. There was no significant bleeding at the conclusion of the procedure. Patient tolerated procedure well without discomfort or difficulty. Sponge, needle and instrument counts were correct times 2.     I reviewed postoperative instructions including the avoidance of intercourse and tampon use. I plan on seeing her in two weeks for follow up and review of pathology.    ASSESSMENT:    PLAN:  The patient was counseled regarding the potential for post-procedural discharge. She was asked not to place anything inside the vagina for approximately 4 weeks. She was given bleeding precautions to return to the office for any heavy bleeding greater than 1 pad per hour  RUDY STINSON 78y Female  MRN#: [id number]   Hospital Day: 10d    SUBJECTIVE  Patient is a 78y old Female who presents with a chief complaint of Left femoral neck fracture (01 Jun 2023 09:45)  Currently admitted to medicine with the primary diagnosis of Hip fracture      INTERVAL HPI AND OVERNIGHT EVENTS:  Patient was examined and seen at bedside. This morning she is resting comfortably in bed and reports no issues or overnight events.    OBJECTIVE  PAST MEDICAL & SURGICAL HISTORY  HTN (hypertension)    Hypercholesteremia    TIA (transient ischemic attack)  2016    Murmur    Cancer  BREAST   MASTECTOMY RIGHT  CHEMO [id number]    Breast CA  Left -1/2021    Pre-diabetes    H/O right mastectomy  1989    S/P excision of lipoma    H/O left mastectomy  2021    History of lung biopsy  3/2021      ALLERGIES:  penicillins (Swelling)    MEDICATIONS:  STANDING MEDICATIONS  aspirin  chewable 81 milliGRAM(s) Oral daily  bisacodyl 5 milliGRAM(s) Oral daily  enalapril 10 milliGRAM(s) Oral daily  enoxaparin Injectable 40 milliGRAM(s) SubCutaneous every 24 hours  gabapentin 100 milliGRAM(s) Oral three times a day  metoprolol tartrate 25 milliGRAM(s) Oral daily  pantoprazole    Tablet 40 milliGRAM(s) Oral before breakfast  polyethylene glycol 3350 17 Gram(s) Oral two times a day  senna 2 Tablet(s) Oral at bedtime  simvastatin 20 milliGRAM(s) Oral at bedtime    PRN MEDICATIONS  acetaminophen     Tablet .. 650 milliGRAM(s) Oral every 6 hours PRN  aluminum hydroxide/magnesium hydroxide/simethicone Suspension 30 milliLiter(s) Oral every 4 hours PRN  HYDROmorphone  Injectable 0.5 milliGRAM(s) IV Push every 10 minutes PRN  melatonin 3 milliGRAM(s) Oral at bedtime PRN  morphine  - Injectable 2 milliGRAM(s) IV Push every 4 hours PRN  ondansetron Injectable 4 milliGRAM(s) IV Push every 8 hours PRN      VITAL SIGNS: Last 24 Hours  T(C): 36.8 (02 Jun 2023 08:00), Max: 37 (01 Jun 2023 15:45)  T(F): 98.3 (02 Jun 2023 08:00), Max: 98.6 (01 Jun 2023 15:45)  HR: 66 (02 Jun 2023 08:00) (66 - 87)  BP: 119/56 (02 Jun 2023 08:00) (94/51 - 121/58)  BP(mean): --  RR: 18 (02 Jun 2023 08:00) (18 - 18)  SpO2: 95% (01 Jun 2023 15:45) (95% - 95%)    LABS:                        10.1   7.41  )-----------( 192      ( 01 Jun 2023 06:19 )             30.2                       RADIOLOGY:          PHYSICAL EXAM:  CONSTITUTIONAL: No acute distress, well-developed, well-groomed, AAOx3  PULMONARY: Clear to auscultation bilaterally; no wheezes, rales, or rhonchi  CARDIOVASCULAR: Regular rate and rhythm; no murmurs, rubs, or gallops  GASTROINTESTINAL: Soft, non-tender, non-distended; bowel sounds present  MUSCULOSKELETAL: surgery site noted     or for any fevers. She was asked to follow up in the office for repeat pap smear in 6 months. She was also encouraged to take Motrin for any cramping.

## 2024-08-22 ENCOUNTER — LABORATORY RESULT (OUTPATIENT)
Age: 79
End: 2024-08-22

## 2024-08-22 ENCOUNTER — APPOINTMENT (OUTPATIENT)
Age: 79
End: 2024-08-22
Payer: MEDICARE

## 2024-08-22 ENCOUNTER — OUTPATIENT (OUTPATIENT)
Dept: OUTPATIENT SERVICES | Facility: HOSPITAL | Age: 79
LOS: 1 days | End: 2024-08-22
Payer: MEDICARE

## 2024-08-22 VITALS
HEART RATE: 97 BPM | SYSTOLIC BLOOD PRESSURE: 143 MMHG | DIASTOLIC BLOOD PRESSURE: 68 MMHG | TEMPERATURE: 97.7 F | HEIGHT: 64 IN | WEIGHT: 169 LBS | BODY MASS INDEX: 28.85 KG/M2 | RESPIRATION RATE: 16 BRPM

## 2024-08-22 DIAGNOSIS — C50.919 MALIGNANT NEOPLASM OF UNSPECIFIED SITE OF UNSPECIFIED FEMALE BREAST: ICD-10-CM

## 2024-08-22 DIAGNOSIS — Z98.890 OTHER SPECIFIED POSTPROCEDURAL STATES: Chronic | ICD-10-CM

## 2024-08-22 DIAGNOSIS — Z90.11 ACQUIRED ABSENCE OF RIGHT BREAST AND NIPPLE: Chronic | ICD-10-CM

## 2024-08-22 DIAGNOSIS — R91.8 OTHER NONSPECIFIC ABNORMAL FINDING OF LUNG FIELD: ICD-10-CM

## 2024-08-22 DIAGNOSIS — Z90.12 ACQUIRED ABSENCE OF LEFT BREAST AND NIPPLE: Chronic | ICD-10-CM

## 2024-08-22 LAB
ALBUMIN SERPL ELPH-MCNC: 4.7 G/DL
ALP BLD-CCNC: 72 U/L
ALT SERPL-CCNC: 12 U/L
ANION GAP SERPL CALC-SCNC: 14 MMOL/L
AST SERPL-CCNC: 16 U/L
BILIRUB SERPL-MCNC: 0.6 MG/DL
BUN SERPL-MCNC: 18 MG/DL
CALCIUM SERPL-MCNC: 9.6 MG/DL
CHLORIDE SERPL-SCNC: 101 MMOL/L
CO2 SERPL-SCNC: 22 MMOL/L
CREAT SERPL-MCNC: 0.7 MG/DL
EGFR: 88 ML/MIN/1.73M2
GLUCOSE SERPL-MCNC: 106 MG/DL
HCT VFR BLD CALC: 39.8 %
HGB BLD-MCNC: 13.6 G/DL
MCHC RBC-ENTMCNC: 31.1 PG
MCHC RBC-ENTMCNC: 34.2 G/DL
MCV RBC AUTO: 91.1 FL
PLATELET # BLD AUTO: 173 K/UL
PMV BLD: 11.4 FL
POTASSIUM SERPL-SCNC: 4.8 MMOL/L
PROT SERPL-MCNC: 7.4 G/DL
RBC # BLD: 4.37 M/UL
RBC # FLD: 12.7 %
SODIUM SERPL-SCNC: 137 MMOL/L
WBC # FLD AUTO: 6.94 K/UL

## 2024-08-22 PROCEDURE — 85027 COMPLETE CBC AUTOMATED: CPT

## 2024-08-22 PROCEDURE — 36415 COLL VENOUS BLD VENIPUNCTURE: CPT

## 2024-08-22 PROCEDURE — 99213 OFFICE O/P EST LOW 20 MIN: CPT

## 2024-08-22 PROCEDURE — 80053 COMPREHEN METABOLIC PANEL: CPT

## 2024-08-22 NOTE — REVIEW OF SYSTEMS
[Insomnia] : insomnia [Negative] : Heme/Lymph [de-identified] : Gets up a few times to go to the bathroom.

## 2024-08-22 NOTE — ASSESSMENT
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 The situation was discussed with the patient. Will obtain CBC and CMP and flush the Port-A-Cath with heparin. Further recommendations, as needed, after the above test results are available. She was told to remain up to date with her colonoscopy. If all within acceptable limits, she will be seen again in 6 months for follow up, or sooner if new events related to her cancer occur.  All questions answered.

## 2024-08-22 NOTE — HISTORY OF PRESENT ILLNESS
[Disease: _____________________] : Disease: [unfilled] [AJCC Stage: ____] : AJCC Stage: [unfilled] [de-identified] : The patient is coming for a follow up for her history of breast cancers.  The first one was almost 31 years ago (the right side) and the second one was more recently in 2021 (S/P mastectomy followed by adjuvant chemotherapy). Both tumors were ER/DC/HER2 (triple) negative.  At present, she has no new particular complaints.  She is being followed by her pulmonary specialist for sleep apnea and using CPAP at home. No new medications. Recent bone density test has shown osteopenia. She was seeing orthopedic doctor and was recommended to take calcium and Vitamin D and repeat bone density in 2 years.  [de-identified] : Invasive ductal carcinoma [de-identified] : Triple negative

## 2024-08-22 NOTE — PHYSICAL EXAM
[Fully active, able to carry on all pre-disease performance without restriction] : Status 0 - Fully active, able to carry on all pre-disease performance without restriction [Normal] : affect appropriate [de-identified] : S/P bilateral mastectomy, no evidence of local recurrence. [de-identified] : Small abdominal hernia noted.  [de-identified] : Some arthritic changes noted. B/L ankle non pitting ankle edema noted. Varicose veins in B/L Lower extremities.

## 2024-10-03 ENCOUNTER — APPOINTMENT (OUTPATIENT)
Age: 79
End: 2024-10-03

## 2024-10-03 ENCOUNTER — OUTPATIENT (OUTPATIENT)
Dept: OUTPATIENT SERVICES | Facility: HOSPITAL | Age: 79
LOS: 1 days | End: 2024-10-03
Payer: MEDICARE

## 2024-10-03 DIAGNOSIS — Z98.890 OTHER SPECIFIED POSTPROCEDURAL STATES: Chronic | ICD-10-CM

## 2024-10-03 DIAGNOSIS — Z90.11 ACQUIRED ABSENCE OF RIGHT BREAST AND NIPPLE: Chronic | ICD-10-CM

## 2024-10-03 DIAGNOSIS — R91.8 OTHER NONSPECIFIC ABNORMAL FINDING OF LUNG FIELD: ICD-10-CM

## 2024-10-03 DIAGNOSIS — Z90.12 ACQUIRED ABSENCE OF LEFT BREAST AND NIPPLE: Chronic | ICD-10-CM

## 2024-10-03 PROCEDURE — 96365 THER/PROPH/DIAG IV INF INIT: CPT

## 2024-10-04 DIAGNOSIS — R91.8 OTHER NONSPECIFIC ABNORMAL FINDING OF LUNG FIELD: ICD-10-CM
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
## 2024-11-14 ENCOUNTER — APPOINTMENT (OUTPATIENT)
Age: 79
End: 2024-11-14

## 2024-11-14 ENCOUNTER — OUTPATIENT (OUTPATIENT)
Dept: OUTPATIENT SERVICES | Facility: HOSPITAL | Age: 79
LOS: 1 days | End: 2024-11-14
Payer: MEDICARE

## 2024-11-14 VITALS — DIASTOLIC BLOOD PRESSURE: 75 MMHG | TEMPERATURE: 98 F | SYSTOLIC BLOOD PRESSURE: 134 MMHG | HEART RATE: 67 BPM

## 2024-11-14 DIAGNOSIS — Z98.890 OTHER SPECIFIED POSTPROCEDURAL STATES: Chronic | ICD-10-CM

## 2024-11-14 DIAGNOSIS — Z90.12 ACQUIRED ABSENCE OF LEFT BREAST AND NIPPLE: Chronic | ICD-10-CM

## 2024-11-14 DIAGNOSIS — Z90.11 ACQUIRED ABSENCE OF RIGHT BREAST AND NIPPLE: Chronic | ICD-10-CM

## 2024-11-14 DIAGNOSIS — R91.8 OTHER NONSPECIFIC ABNORMAL FINDING OF LUNG FIELD: ICD-10-CM

## 2024-11-14 PROCEDURE — 96523 IRRIG DRUG DELIVERY DEVICE: CPT

## 2024-11-15 DIAGNOSIS — R91.8 OTHER NONSPECIFIC ABNORMAL FINDING OF LUNG FIELD: ICD-10-CM

## 2024-11-21 ENCOUNTER — OUTPATIENT (OUTPATIENT)
Dept: OUTPATIENT SERVICES | Facility: HOSPITAL | Age: 79
LOS: 1 days | End: 2024-11-21
Payer: MEDICARE

## 2024-11-21 ENCOUNTER — RESULT REVIEW (OUTPATIENT)
Age: 79
End: 2024-11-21

## 2024-11-21 DIAGNOSIS — R07.9 CHEST PAIN, UNSPECIFIED: ICD-10-CM

## 2024-11-21 DIAGNOSIS — Z00.8 ENCOUNTER FOR OTHER GENERAL EXAMINATION: ICD-10-CM

## 2024-11-21 DIAGNOSIS — Z98.890 OTHER SPECIFIED POSTPROCEDURAL STATES: Chronic | ICD-10-CM

## 2024-11-21 DIAGNOSIS — Z90.12 ACQUIRED ABSENCE OF LEFT BREAST AND NIPPLE: Chronic | ICD-10-CM

## 2024-11-21 PROCEDURE — 75574 CT ANGIO HRT W/3D IMAGE: CPT

## 2024-11-21 PROCEDURE — 75574 CT ANGIO HRT W/3D IMAGE: CPT | Mod: 26

## 2024-11-22 DIAGNOSIS — R07.9 CHEST PAIN, UNSPECIFIED: ICD-10-CM

## 2024-11-29 ENCOUNTER — OUTPATIENT (OUTPATIENT)
Dept: OUTPATIENT SERVICES | Facility: HOSPITAL | Age: 79
LOS: 1 days | End: 2024-11-29
Payer: MEDICARE

## 2024-11-29 ENCOUNTER — RESULT REVIEW (OUTPATIENT)
Age: 79
End: 2024-11-29

## 2024-11-29 DIAGNOSIS — Z90.12 ACQUIRED ABSENCE OF LEFT BREAST AND NIPPLE: Chronic | ICD-10-CM

## 2024-11-29 DIAGNOSIS — R07.9 CHEST PAIN, UNSPECIFIED: ICD-10-CM

## 2024-11-29 DIAGNOSIS — Z98.890 OTHER SPECIFIED POSTPROCEDURAL STATES: Chronic | ICD-10-CM

## 2024-11-29 DIAGNOSIS — Z90.11 ACQUIRED ABSENCE OF RIGHT BREAST AND NIPPLE: Chronic | ICD-10-CM

## 2024-11-29 PROCEDURE — 75580 N-INVAS EST C FFR SW ALY CTA: CPT | Mod: 26

## 2024-11-29 PROCEDURE — 75580 N-INVAS EST C FFR SW ALY CTA: CPT

## 2024-11-30 DIAGNOSIS — R07.9 CHEST PAIN, UNSPECIFIED: ICD-10-CM

## 2024-12-12 ENCOUNTER — APPOINTMENT (OUTPATIENT)
Age: 79
End: 2024-12-12

## 2024-12-12 ENCOUNTER — OUTPATIENT (OUTPATIENT)
Dept: OUTPATIENT SERVICES | Facility: HOSPITAL | Age: 79
LOS: 1 days | End: 2024-12-12
Payer: MEDICARE

## 2024-12-12 VITALS — TEMPERATURE: 98 F | HEART RATE: 50 BPM | SYSTOLIC BLOOD PRESSURE: 161 MMHG | DIASTOLIC BLOOD PRESSURE: 75 MMHG

## 2024-12-12 DIAGNOSIS — Z98.890 OTHER SPECIFIED POSTPROCEDURAL STATES: Chronic | ICD-10-CM

## 2024-12-12 DIAGNOSIS — Z90.11 ACQUIRED ABSENCE OF RIGHT BREAST AND NIPPLE: Chronic | ICD-10-CM

## 2024-12-12 DIAGNOSIS — R91.8 OTHER NONSPECIFIC ABNORMAL FINDING OF LUNG FIELD: ICD-10-CM

## 2024-12-12 DIAGNOSIS — Z90.12 ACQUIRED ABSENCE OF LEFT BREAST AND NIPPLE: Chronic | ICD-10-CM

## 2024-12-12 PROCEDURE — 96523 IRRIG DRUG DELIVERY DEVICE: CPT

## 2024-12-13 DIAGNOSIS — R91.8 OTHER NONSPECIFIC ABNORMAL FINDING OF LUNG FIELD: ICD-10-CM

## 2025-01-23 ENCOUNTER — APPOINTMENT (OUTPATIENT)
Age: 80
End: 2025-01-23

## 2025-01-24 ENCOUNTER — APPOINTMENT (OUTPATIENT)
Age: 80
End: 2025-01-24
Payer: MEDICARE

## 2025-01-24 ENCOUNTER — LABORATORY RESULT (OUTPATIENT)
Age: 80
End: 2025-01-24

## 2025-01-24 ENCOUNTER — OUTPATIENT (OUTPATIENT)
Dept: OUTPATIENT SERVICES | Facility: HOSPITAL | Age: 80
LOS: 1 days | End: 2025-01-24
Payer: MEDICARE

## 2025-01-24 VITALS
HEIGHT: 64 IN | TEMPERATURE: 98.1 F | SYSTOLIC BLOOD PRESSURE: 139 MMHG | WEIGHT: 169 LBS | RESPIRATION RATE: 16 BRPM | DIASTOLIC BLOOD PRESSURE: 74 MMHG | BODY MASS INDEX: 28.85 KG/M2 | HEART RATE: 81 BPM

## 2025-01-24 DIAGNOSIS — Z90.12 ACQUIRED ABSENCE OF LEFT BREAST AND NIPPLE: Chronic | ICD-10-CM

## 2025-01-24 DIAGNOSIS — R91.8 OTHER NONSPECIFIC ABNORMAL FINDING OF LUNG FIELD: ICD-10-CM

## 2025-01-24 DIAGNOSIS — C50.919 MALIGNANT NEOPLASM OF UNSPECIFIED SITE OF UNSPECIFIED FEMALE BREAST: ICD-10-CM

## 2025-01-24 DIAGNOSIS — Z45.2 ENCOUNTER FOR ADJUSTMENT AND MANAGEMENT OF VASCULAR ACCESS DEVICE: ICD-10-CM

## 2025-01-24 DIAGNOSIS — Z98.890 OTHER SPECIFIED POSTPROCEDURAL STATES: Chronic | ICD-10-CM

## 2025-01-24 DIAGNOSIS — Z90.11 ACQUIRED ABSENCE OF RIGHT BREAST AND NIPPLE: Chronic | ICD-10-CM

## 2025-01-24 LAB
ALBUMIN SERPL ELPH-MCNC: 4.5 G/DL
ALP BLD-CCNC: 75 U/L
ALT SERPL-CCNC: 13 U/L
ANION GAP SERPL CALC-SCNC: 13 MMOL/L
AST SERPL-CCNC: 17 U/L
BILIRUB SERPL-MCNC: 0.5 MG/DL
BUN SERPL-MCNC: 16 MG/DL
CALCIUM SERPL-MCNC: 9.2 MG/DL
CHLORIDE SERPL-SCNC: 100 MMOL/L
CO2 SERPL-SCNC: 22 MMOL/L
CREAT SERPL-MCNC: 0.7 MG/DL
EGFR: 88 ML/MIN/1.73M2
GLUCOSE SERPL-MCNC: 97 MG/DL
HCT VFR BLD CALC: 40.5 %
HGB BLD-MCNC: 13.6 G/DL
MCHC RBC-ENTMCNC: 30.1 PG
MCHC RBC-ENTMCNC: 33.6 G/DL
MCV RBC AUTO: 89.6 FL
PLATELET # BLD AUTO: 129 K/UL
PMV BLD: 13.1 FL
POTASSIUM SERPL-SCNC: 4.4 MMOL/L
PROT SERPL-MCNC: 6.8 G/DL
RBC # BLD: 4.52 M/UL
RBC # FLD: 13.1 %
SODIUM SERPL-SCNC: 135 MMOL/L
WBC # FLD AUTO: 6.46 K/UL

## 2025-01-24 PROCEDURE — 99214 OFFICE O/P EST MOD 30 MIN: CPT

## 2025-01-24 PROCEDURE — 85027 COMPLETE CBC AUTOMATED: CPT

## 2025-01-24 PROCEDURE — 96523 IRRIG DRUG DELIVERY DEVICE: CPT

## 2025-01-24 PROCEDURE — 80053 COMPREHEN METABOLIC PANEL: CPT

## 2025-01-24 PROCEDURE — 36415 COLL VENOUS BLD VENIPUNCTURE: CPT

## 2025-01-25 DIAGNOSIS — R91.8 OTHER NONSPECIFIC ABNORMAL FINDING OF LUNG FIELD: ICD-10-CM

## 2025-02-01 ENCOUNTER — RESULT REVIEW (OUTPATIENT)
Age: 80
End: 2025-02-01

## 2025-02-01 ENCOUNTER — OUTPATIENT (OUTPATIENT)
Dept: OUTPATIENT SERVICES | Facility: HOSPITAL | Age: 80
LOS: 1 days | End: 2025-02-01
Payer: MEDICARE

## 2025-02-01 DIAGNOSIS — Z00.8 ENCOUNTER FOR OTHER GENERAL EXAMINATION: ICD-10-CM

## 2025-02-01 DIAGNOSIS — Z90.11 ACQUIRED ABSENCE OF RIGHT BREAST AND NIPPLE: Chronic | ICD-10-CM

## 2025-02-01 DIAGNOSIS — Z90.12 ACQUIRED ABSENCE OF LEFT BREAST AND NIPPLE: Chronic | ICD-10-CM

## 2025-02-01 DIAGNOSIS — Z98.890 OTHER SPECIFIED POSTPROCEDURAL STATES: Chronic | ICD-10-CM

## 2025-02-01 DIAGNOSIS — I48.0 PAROXYSMAL ATRIAL FIBRILLATION: ICD-10-CM

## 2025-02-01 PROCEDURE — A9579: CPT

## 2025-02-01 PROCEDURE — 75561 CARDIAC MRI FOR MORPH W/DYE: CPT

## 2025-02-01 PROCEDURE — 75561 CARDIAC MRI FOR MORPH W/DYE: CPT | Mod: 26

## 2025-02-02 DIAGNOSIS — I48.0 PAROXYSMAL ATRIAL FIBRILLATION: ICD-10-CM

## 2025-02-21 ENCOUNTER — APPOINTMENT (OUTPATIENT)
Age: 80
End: 2025-02-21

## 2025-03-07 ENCOUNTER — APPOINTMENT (OUTPATIENT)
Age: 80
End: 2025-03-07

## 2025-03-07 ENCOUNTER — OUTPATIENT (OUTPATIENT)
Dept: OUTPATIENT SERVICES | Facility: HOSPITAL | Age: 80
LOS: 1 days | End: 2025-03-07
Payer: MEDICARE

## 2025-03-07 DIAGNOSIS — Z90.12 ACQUIRED ABSENCE OF LEFT BREAST AND NIPPLE: Chronic | ICD-10-CM

## 2025-03-07 DIAGNOSIS — Z98.890 OTHER SPECIFIED POSTPROCEDURAL STATES: Chronic | ICD-10-CM

## 2025-03-07 DIAGNOSIS — Z90.11 ACQUIRED ABSENCE OF RIGHT BREAST AND NIPPLE: Chronic | ICD-10-CM

## 2025-03-07 DIAGNOSIS — R91.8 OTHER NONSPECIFIC ABNORMAL FINDING OF LUNG FIELD: ICD-10-CM

## 2025-03-07 PROCEDURE — 96523 IRRIG DRUG DELIVERY DEVICE: CPT

## 2025-03-08 DIAGNOSIS — R91.8 OTHER NONSPECIFIC ABNORMAL FINDING OF LUNG FIELD: ICD-10-CM

## 2025-04-18 ENCOUNTER — OUTPATIENT (OUTPATIENT)
Dept: OUTPATIENT SERVICES | Facility: HOSPITAL | Age: 80
LOS: 1 days | End: 2025-04-18
Payer: MEDICARE

## 2025-04-18 ENCOUNTER — APPOINTMENT (OUTPATIENT)
Age: 80
End: 2025-04-18

## 2025-04-18 VITALS — DIASTOLIC BLOOD PRESSURE: 67 MMHG | SYSTOLIC BLOOD PRESSURE: 168 MMHG

## 2025-04-18 DIAGNOSIS — R91.8 OTHER NONSPECIFIC ABNORMAL FINDING OF LUNG FIELD: ICD-10-CM

## 2025-04-18 DIAGNOSIS — Z98.890 OTHER SPECIFIED POSTPROCEDURAL STATES: Chronic | ICD-10-CM

## 2025-04-18 DIAGNOSIS — Z90.11 ACQUIRED ABSENCE OF RIGHT BREAST AND NIPPLE: Chronic | ICD-10-CM

## 2025-04-18 DIAGNOSIS — Z90.12 ACQUIRED ABSENCE OF LEFT BREAST AND NIPPLE: Chronic | ICD-10-CM

## 2025-04-18 LAB
AUTO BASOPHILS #: 0.03 K/UL
AUTO BASOPHILS %: 0.4 %
AUTO EOSINOPHILS #: 0.09 K/UL
AUTO EOSINOPHILS %: 1.3 %
AUTO IMMATURE GRANULOCYTES #: 0.01 K/UL
AUTO LYMPHOCYTES #: 2.1 K/UL
AUTO LYMPHOCYTES %: 30.8 %
AUTO MONOCYTES #: 0.7 K/UL
AUTO MONOCYTES %: 10.3 %
AUTO NEUTROPHILS #: 3.89 K/UL
AUTO NEUTROPHILS %: 57.1 %
AUTO NRBC #: 0 K/UL
HCT VFR BLD CALC: 40.9 %
HGB BLD-MCNC: 13.7 G/DL
IMM GRANULOCYTES NFR BLD AUTO: 0.1 %
MAN DIFF?: NORMAL
MCHC RBC-ENTMCNC: 30.5 PG
MCHC RBC-ENTMCNC: 33.5 G/DL
MCV RBC AUTO: 91.1 FL
PLATELET # BLD AUTO: 151 K/UL
PMV BLD AUTO: 0 /100 WBCS
PMV BLD: 12.8 FL
RBC # BLD: 4.49 M/UL
RBC # FLD: 13.9 %
WBC # FLD AUTO: 6.82 K/UL

## 2025-04-18 PROCEDURE — 80053 COMPREHEN METABOLIC PANEL: CPT

## 2025-04-18 PROCEDURE — 85025 COMPLETE CBC W/AUTO DIFF WBC: CPT

## 2025-04-18 PROCEDURE — 36415 COLL VENOUS BLD VENIPUNCTURE: CPT

## 2025-04-18 PROCEDURE — 96523 IRRIG DRUG DELIVERY DEVICE: CPT

## 2025-04-19 DIAGNOSIS — R91.8 OTHER NONSPECIFIC ABNORMAL FINDING OF LUNG FIELD: ICD-10-CM

## 2025-04-21 LAB
ALBUMIN SERPL ELPH-MCNC: 4.3 G/DL
ALP BLD-CCNC: 80 U/L
ALT SERPL-CCNC: 17 U/L
ANION GAP SERPL CALC-SCNC: 14 MMOL/L
AST SERPL-CCNC: 14 U/L
BILIRUB SERPL-MCNC: 0.5 MG/DL
BUN SERPL-MCNC: 22 MG/DL
CALCIUM SERPL-MCNC: 9.9 MG/DL
CHLORIDE SERPL-SCNC: 104 MMOL/L
CO2 SERPL-SCNC: 23 MMOL/L
CREAT SERPL-MCNC: 0.7 MG/DL
EGFRCR SERPLBLD CKD-EPI 2021: 88 ML/MIN/1.73M2
GLUCOSE SERPL-MCNC: 79 MG/DL
POTASSIUM SERPL-SCNC: 4.3 MMOL/L
PROT SERPL-MCNC: 6.8 G/DL
SODIUM SERPL-SCNC: 140 MMOL/L

## 2025-05-30 ENCOUNTER — APPOINTMENT (OUTPATIENT)
Age: 80
End: 2025-05-30

## 2025-05-30 ENCOUNTER — OUTPATIENT (OUTPATIENT)
Dept: OUTPATIENT SERVICES | Facility: HOSPITAL | Age: 80
LOS: 1 days | End: 2025-05-30
Payer: MEDICARE

## 2025-05-30 VITALS
HEART RATE: 81 BPM | SYSTOLIC BLOOD PRESSURE: 150 MMHG | RESPIRATION RATE: 18 BRPM | TEMPERATURE: 97 F | DIASTOLIC BLOOD PRESSURE: 57 MMHG | OXYGEN SATURATION: 98 %

## 2025-05-30 DIAGNOSIS — Z98.890 OTHER SPECIFIED POSTPROCEDURAL STATES: Chronic | ICD-10-CM

## 2025-05-30 DIAGNOSIS — R91.8 OTHER NONSPECIFIC ABNORMAL FINDING OF LUNG FIELD: ICD-10-CM

## 2025-05-30 DIAGNOSIS — Z90.12 ACQUIRED ABSENCE OF LEFT BREAST AND NIPPLE: Chronic | ICD-10-CM

## 2025-05-30 DIAGNOSIS — Z90.11 ACQUIRED ABSENCE OF RIGHT BREAST AND NIPPLE: Chronic | ICD-10-CM

## 2025-05-30 LAB
ALBUMIN SERPL ELPH-MCNC: 4.3 G/DL
ALP BLD-CCNC: 81 U/L
ALT SERPL-CCNC: 19 U/L
ANION GAP SERPL CALC-SCNC: 13 MMOL/L
AST SERPL-CCNC: 20 U/L
AUTO BASOPHILS #: 0.03 K/UL
AUTO BASOPHILS %: 0.4 %
AUTO EOSINOPHILS #: 0.08 K/UL
AUTO EOSINOPHILS %: 1.1 %
AUTO IMMATURE GRANULOCYTES #: 0.02 K/UL
AUTO LYMPHOCYTES #: 2.39 K/UL
AUTO LYMPHOCYTES %: 32.7 %
AUTO MONOCYTES #: 0.67 K/UL
AUTO MONOCYTES %: 9.2 %
AUTO NEUTROPHILS #: 4.13 K/UL
AUTO NEUTROPHILS %: 56.3 %
AUTO NRBC #: 0 K/UL
BILIRUB SERPL-MCNC: 0.5 MG/DL
BUN SERPL-MCNC: 20 MG/DL
CALCIUM SERPL-MCNC: 9.9 MG/DL
CHLORIDE SERPL-SCNC: 103 MMOL/L
CO2 SERPL-SCNC: 23 MMOL/L
CREAT SERPL-MCNC: 0.8 MG/DL
EGFRCR SERPLBLD CKD-EPI 2021: 75 ML/MIN/1.73M2
GLUCOSE SERPL-MCNC: 93 MG/DL
HCT VFR BLD CALC: 41.3 %
HGB BLD-MCNC: 14.4 G/DL
IMM GRANULOCYTES NFR BLD AUTO: 0.3 %
MAN DIFF?: NORMAL
MCHC RBC-ENTMCNC: 31.4 PG
MCHC RBC-ENTMCNC: 34.9 G/DL
MCV RBC AUTO: 90.2 FL
PLATELET # BLD AUTO: 155 K/UL
PMV BLD AUTO: 0 /100 WBCS
PMV BLD: 11.8 FL
POTASSIUM SERPL-SCNC: 4.6 MMOL/L
PROT SERPL-MCNC: 6.8 G/DL
RBC # BLD: 4.58 M/UL
RBC # FLD: 12.8 %
SODIUM SERPL-SCNC: 139 MMOL/L
WBC # FLD AUTO: 7.32 K/UL

## 2025-05-30 PROCEDURE — 36415 COLL VENOUS BLD VENIPUNCTURE: CPT

## 2025-05-30 PROCEDURE — 80053 COMPREHEN METABOLIC PANEL: CPT

## 2025-05-30 PROCEDURE — 85025 COMPLETE CBC W/AUTO DIFF WBC: CPT

## 2025-05-30 PROCEDURE — 36591 DRAW BLOOD OFF VENOUS DEVICE: CPT

## 2025-05-31 DIAGNOSIS — R91.8 OTHER NONSPECIFIC ABNORMAL FINDING OF LUNG FIELD: ICD-10-CM

## 2025-06-02 ENCOUNTER — OUTPATIENT (OUTPATIENT)
Dept: OUTPATIENT SERVICES | Facility: HOSPITAL | Age: 80
LOS: 1 days | End: 2025-06-02
Payer: MEDICARE

## 2025-06-02 VITALS
HEIGHT: 64 IN | DIASTOLIC BLOOD PRESSURE: 80 MMHG | WEIGHT: 167.99 LBS | TEMPERATURE: 98 F | OXYGEN SATURATION: 96 % | RESPIRATION RATE: 18 BRPM | HEART RATE: 57 BPM | SYSTOLIC BLOOD PRESSURE: 129 MMHG

## 2025-06-02 DIAGNOSIS — Z95.828 PRESENCE OF OTHER VASCULAR IMPLANTS AND GRAFTS: Chronic | ICD-10-CM

## 2025-06-02 DIAGNOSIS — Z90.12 ACQUIRED ABSENCE OF LEFT BREAST AND NIPPLE: Chronic | ICD-10-CM

## 2025-06-02 DIAGNOSIS — Z90.11 ACQUIRED ABSENCE OF RIGHT BREAST AND NIPPLE: Chronic | ICD-10-CM

## 2025-06-02 DIAGNOSIS — Z01.818 ENCOUNTER FOR OTHER PREPROCEDURAL EXAMINATION: ICD-10-CM

## 2025-06-02 DIAGNOSIS — Z98.890 OTHER SPECIFIED POSTPROCEDURAL STATES: Chronic | ICD-10-CM

## 2025-06-02 DIAGNOSIS — I49.3 VENTRICULAR PREMATURE DEPOLARIZATION: ICD-10-CM

## 2025-06-02 LAB
APPEARANCE UR: CLEAR — SIGNIFICANT CHANGE UP
APTT BLD: 27.4 SEC — SIGNIFICANT CHANGE UP (ref 27–39.2)
BACTERIA # UR AUTO: NEGATIVE /HPF — SIGNIFICANT CHANGE UP
BILIRUB UR-MCNC: NEGATIVE — SIGNIFICANT CHANGE UP
BLD GP AB SCN SERPL QL: SIGNIFICANT CHANGE UP
CAST: 0 /LPF — SIGNIFICANT CHANGE UP (ref 0–4)
COLOR SPEC: YELLOW — SIGNIFICANT CHANGE UP
DIFF PNL FLD: NEGATIVE — SIGNIFICANT CHANGE UP
GLUCOSE UR QL: NEGATIVE MG/DL — SIGNIFICANT CHANGE UP
INR BLD: 0.97 RATIO — SIGNIFICANT CHANGE UP (ref 0.65–1.3)
KETONES UR QL: ABNORMAL MG/DL
LEUKOCYTE ESTERASE UR-ACNC: ABNORMAL
NITRITE UR-MCNC: NEGATIVE — SIGNIFICANT CHANGE UP
PH UR: 5.5 — SIGNIFICANT CHANGE UP (ref 5–8)
PROT UR-MCNC: SIGNIFICANT CHANGE UP MG/DL
PROTHROM AB SERPL-ACNC: 11.4 SEC — SIGNIFICANT CHANGE UP (ref 9.95–12.87)
RBC CASTS # UR COMP ASSIST: 1 /HPF — SIGNIFICANT CHANGE UP (ref 0–4)
SP GR SPEC: >1.03 — HIGH (ref 1–1.03)
SQUAMOUS # UR AUTO: 2 /HPF — SIGNIFICANT CHANGE UP (ref 0–5)
UROBILINOGEN FLD QL: 1 MG/DL — SIGNIFICANT CHANGE UP (ref 0.2–1)
WBC UR QL: 3 /HPF — SIGNIFICANT CHANGE UP (ref 0–5)

## 2025-06-02 PROCEDURE — 85730 THROMBOPLASTIN TIME PARTIAL: CPT

## 2025-06-02 PROCEDURE — 87077 CULTURE AEROBIC IDENTIFY: CPT

## 2025-06-02 PROCEDURE — 36415 COLL VENOUS BLD VENIPUNCTURE: CPT

## 2025-06-02 PROCEDURE — 86901 BLOOD TYPING SEROLOGIC RH(D): CPT

## 2025-06-02 PROCEDURE — 93010 ELECTROCARDIOGRAM REPORT: CPT

## 2025-06-02 PROCEDURE — 99214 OFFICE O/P EST MOD 30 MIN: CPT | Mod: 25

## 2025-06-02 PROCEDURE — 81001 URINALYSIS AUTO W/SCOPE: CPT

## 2025-06-02 PROCEDURE — 93005 ELECTROCARDIOGRAM TRACING: CPT

## 2025-06-02 PROCEDURE — 86900 BLOOD TYPING SEROLOGIC ABO: CPT

## 2025-06-02 PROCEDURE — 86850 RBC ANTIBODY SCREEN: CPT

## 2025-06-02 PROCEDURE — 85610 PROTHROMBIN TIME: CPT

## 2025-06-02 PROCEDURE — 87086 URINE CULTURE/COLONY COUNT: CPT

## 2025-06-02 RX ORDER — METOPROLOL SUCCINATE 50 MG/1
1 TABLET, EXTENDED RELEASE ORAL
Refills: 0 | DISCHARGE

## 2025-06-02 NOTE — H&P PST ADULT - REASON FOR ADMISSION
81 Y/O F WITH PMHX HTN, HLD, DVT S/P HIP ORIF 2 YRS AGO NOW OFF BLOOD THINNERS, NEUROPATHY, SCHEDULED FOR PAST FOR  PVC ABLATION UNDER GA WITH DR SURESH ON 6/16/25. PT REPORTS FREQUENT PALPITATIONS FOR ABOUT 1 YR. SHE WORE CARDIAC MONITOR THAT SHOWED FREQUENT PVC'S. SHE REPORTS OCCASIONAL PALPITATIONS AND DIZZINESS.

## 2025-06-02 NOTE — H&P PST ADULT - HISTORY OF PRESENT ILLNESS
PATIENT CURRENTLY DENIES CHEST PAIN  SHORTNESS OF BREATH  PALPITATIONS,  DYSURIA, OR UPPER RESPIRATORY INFECTION IN PAST 2 WEEKS      Denies travel outside the [place] in the past 30 days  Patient denies any signs or symptoms of COVID 19 and denies contact with known positive individuals.         Anesthesia Alert  YES--Difficult Airway CLASS IV  NO--History of neck surgery or radiation  NO--Limited ROM of neck  NO--History of Malignant hyperthermia  NO--No personal or family history of Pseudocholinesterase deficiency.  NO--Prior Anesthesia Complication  NO--Latex Allergy  NO--Loose teeth  NO--History of Rheumatoid Arthritis  YES--Bleeding risk ASPIRIN 81 MG   YES--JOHN +CPAP  YES--Other_____ RIGHT ARM PRECAUTIONS; SCOLIOSIS/KYPHOSIS     DASI 6.79    RCRI 2    PT DENIES ANY RASHES, ABRASION, OR OPEN WOUNDS OR CUTS    AS PER THE PT, THIS IS HIS/HER COMPLETE MEDICAL AND SURGICAL HX, INCLUDING MEDICATIONS PRESCRIBED AND OVER THE COUNTER    Patient/Guardian understands the instructions and was given the opportunity to ask questions and have them answered.    pt denies any suicidal ideation or thoughts, pt states not a threat to self or others

## 2025-06-02 NOTE — H&P PST ADULT - NSICDXPASTMEDICALHX_GEN_ALL_CORE_FT
PAST MEDICAL HISTORY:  Breast CA Left -1/2021    Cancer BREAST   MASTECTOMY RIGHT  CHEMO [id number]    Frequent PVCs     H/O scoliosis     HTN (hypertension)     Hypercholesteremia     Murmur     JOHN on CPAP     Pre-diabetes     TIA (transient ischemic attack) 2016

## 2025-06-02 NOTE — H&P PST ADULT - NSICDXPASTSURGICALHX_GEN_ALL_CORE_FT
PAST SURGICAL HISTORY:  H/O left mastectomy 2021    H/O right mastectomy 1989    History of lung biopsy 3/2021    Port-A-Cath in place     S/P excision of lipoma

## 2025-06-03 DIAGNOSIS — I49.3 VENTRICULAR PREMATURE DEPOLARIZATION: ICD-10-CM

## 2025-06-03 DIAGNOSIS — Z01.818 ENCOUNTER FOR OTHER PREPROCEDURAL EXAMINATION: ICD-10-CM

## 2025-06-04 LAB
CULTURE RESULTS: ABNORMAL
SPECIMEN SOURCE: SIGNIFICANT CHANGE UP

## 2025-06-13 ENCOUNTER — APPOINTMENT (OUTPATIENT)
Age: 80
End: 2025-06-13

## 2025-06-16 ENCOUNTER — APPOINTMENT (OUTPATIENT)
Dept: ELECTROPHYSIOLOGY | Facility: HOSPITAL | Age: 80
End: 2025-06-16

## 2025-07-11 ENCOUNTER — APPOINTMENT (OUTPATIENT)
Age: 80
End: 2025-07-11
Payer: MEDICARE

## 2025-07-11 VITALS
DIASTOLIC BLOOD PRESSURE: 67 MMHG | HEIGHT: 64 IN | RESPIRATION RATE: 16 BRPM | OXYGEN SATURATION: 99 % | SYSTOLIC BLOOD PRESSURE: 139 MMHG | WEIGHT: 171 LBS | BODY MASS INDEX: 29.19 KG/M2 | HEART RATE: 73 BPM | TEMPERATURE: 98.2 F

## 2025-07-11 PROBLEM — I49.3 VENTRICULAR PREMATURE DEPOLARIZATION: Chronic | Status: ACTIVE | Noted: 2025-06-02

## 2025-07-11 PROBLEM — Z87.39 PERSONAL HISTORY OF OTHER DISEASES OF THE MUSCULOSKELETAL SYSTEM AND CONNECTIVE TISSUE: Chronic | Status: ACTIVE | Noted: 2025-06-02

## 2025-07-11 PROBLEM — G47.33 OBSTRUCTIVE SLEEP APNEA (ADULT) (PEDIATRIC): Chronic | Status: ACTIVE | Noted: 2025-06-02

## 2025-07-11 LAB
ALBUMIN SERPL ELPH-MCNC: 4.5 G/DL
ALP BLD-CCNC: 72 U/L
ALT SERPL-CCNC: 15 U/L
ANION GAP SERPL CALC-SCNC: 14 MMOL/L
AST SERPL-CCNC: 20 U/L
AUTO BASOPHILS #: 0.03 K/UL
AUTO BASOPHILS %: 0.4 %
AUTO EOSINOPHILS #: 0.09 K/UL
AUTO EOSINOPHILS %: 1.2 %
AUTO IMMATURE GRANULOCYTES #: 0.01 K/UL
AUTO LYMPHOCYTES #: 2.37 K/UL
AUTO LYMPHOCYTES %: 31.3 %
AUTO MONOCYTES #: 0.6 K/UL
AUTO MONOCYTES %: 7.9 %
AUTO NEUTROPHILS #: 4.47 K/UL
AUTO NEUTROPHILS %: 59.1 %
AUTO NRBC #: 0 K/UL
BILIRUB SERPL-MCNC: 0.6 MG/DL
BUN SERPL-MCNC: 21 MG/DL
CALCIUM SERPL-MCNC: 9.5 MG/DL
CHLORIDE SERPL-SCNC: 103 MMOL/L
CO2 SERPL-SCNC: 22 MMOL/L
CREAT SERPL-MCNC: 0.7 MG/DL
EGFRCR SERPLBLD CKD-EPI 2021: 88 ML/MIN/1.73M2
GLUCOSE SERPL-MCNC: 105 MG/DL
HCT VFR BLD CALC: 39.8 %
HGB BLD-MCNC: 13.8 G/DL
IMM GRANULOCYTES NFR BLD AUTO: 0.1 %
MAN DIFF?: NORMAL
MCHC RBC-ENTMCNC: 30.9 PG
MCHC RBC-ENTMCNC: 34.7 G/DL
MCV RBC AUTO: 89 FL
PLATELET # BLD AUTO: 168 K/UL
PMV BLD AUTO: 0 /100 WBCS
PMV BLD: 11.8 FL
POTASSIUM SERPL-SCNC: 4.5 MMOL/L
PROT SERPL-MCNC: 7 G/DL
RBC # BLD: 4.47 M/UL
RBC # FLD: 13.6 %
SODIUM SERPL-SCNC: 139 MMOL/L
WBC # FLD AUTO: 7.57 K/UL

## 2025-07-11 PROCEDURE — 99213 OFFICE O/P EST LOW 20 MIN: CPT

## 2025-07-19 NOTE — ASU PREOP CHECKLIST - NSSDAENDDT_GEN_ALL_CORE
Received call from microbiology lab reporting that patient's urine culture is growing ESBL.  Resistant to ciprofloxacin but sensitive to nitrofurantoin, Bactrim, meropenem, ertapenem, pip-tazo, gentamicin.    Will prescribe Bactrim DS p.o. twice daily for 5 days.    Contacted phone number on file and spoke with Cl[facility] patient's PoC and discussed plan.  If patient is not improving in next 48 hours and should be evaluated in ER if worsening.  
15-Shade-2021 10:21

## 2025-07-29 NOTE — H&P PST ADULT - TOBACCO USE
Please bring in a copy of your advance directive at your next visit, or drop off a copy at any [facility] facility so that it can be added to your medical record.     [General Appearance - Alert] : alert [General Appearance - In No Acute Distress] : in no acute distress [Sclera] : the sclera and conjunctiva were normal [Outer Ear] : the ears and nose were normal in appearance [Neck Appearance] : the appearance of the neck was normal [] : no respiratory distress [Apical Impulse] : the apical impulse was normal [Abdomen Soft] : soft [Abdomen Tenderness] : non-tender [Abnormal Walk] : normal gait [Skin Color & Pigmentation] : normal skin color and pigmentation [Cranial Nerves] : cranial nerves 2-12 were intact [Oriented To Time, Place, And Person] : oriented to person, place, and time Never smoker

## 2025-08-22 ENCOUNTER — APPOINTMENT (OUTPATIENT)
Age: 80
End: 2025-08-22

## 2025-08-22 LAB
ALBUMIN SERPL ELPH-MCNC: 4.2 G/DL
ALP BLD-CCNC: 55 U/L
ALT SERPL-CCNC: 12 U/L
ANION GAP SERPL CALC-SCNC: 9 MMOL/L
AST SERPL-CCNC: 13 U/L
AUTO BASOPHILS #: 0.02 K/UL
AUTO BASOPHILS %: 0.3 %
AUTO EOSINOPHILS #: 0.11 K/UL
AUTO EOSINOPHILS %: 1.5 %
AUTO IMMATURE GRANULOCYTES #: 0.02 K/UL
AUTO LYMPHOCYTES #: 1.93 K/UL
AUTO LYMPHOCYTES %: 26.8 %
AUTO MONOCYTES #: 0.76 K/UL
AUTO MONOCYTES %: 10.5 %
AUTO NEUTROPHILS #: 4.37 K/UL
AUTO NEUTROPHILS %: 60.6 %
AUTO NRBC #: 0 K/UL
BILIRUB SERPL-MCNC: 0.5 MG/DL
BUN SERPL-MCNC: 20 MG/DL
CALCIUM SERPL-MCNC: 9.1 MG/DL
CHLORIDE SERPL-SCNC: 106 MMOL/L
CO2 SERPL-SCNC: 24 MMOL/L
CREAT SERPL-MCNC: 0.7 MG/DL
EGFRCR SERPLBLD CKD-EPI 2021: 88 ML/MIN/1.73M2
GLUCOSE SERPL-MCNC: 94 MG/DL
HCT VFR BLD CALC: 36.8 %
HGB BLD-MCNC: 12.7 G/DL
IMM GRANULOCYTES NFR BLD AUTO: 0.3 %
MAN DIFF?: NORMAL
MCHC RBC-ENTMCNC: 31.4 PG
MCHC RBC-ENTMCNC: 34.5 G/DL
MCV RBC AUTO: 91.1 FL
PLATELET # BLD AUTO: 137 K/UL
PMV BLD AUTO: 0 /100 WBCS
PMV BLD: 13 FL
POTASSIUM SERPL-SCNC: 4.5 MMOL/L
PROT SERPL-MCNC: 6.3 G/DL
RBC # BLD: 4.04 M/UL
RBC # FLD: 14.3 %
SODIUM SERPL-SCNC: 139 MMOL/L
WBC # FLD AUTO: 7.21 K/UL